# Patient Record
Sex: FEMALE | Race: WHITE | NOT HISPANIC OR LATINO | URBAN - METROPOLITAN AREA
[De-identification: names, ages, dates, MRNs, and addresses within clinical notes are randomized per-mention and may not be internally consistent; named-entity substitution may affect disease eponyms.]

---

## 2017-04-21 ENCOUNTER — OUTPATIENT (OUTPATIENT)
Dept: OUTPATIENT SERVICES | Age: 27
LOS: 1 days | Discharge: HOME | End: 2017-04-21

## 2017-04-21 PROBLEM — Z98.890 S/P FONTAN PROCEDURE: Status: ACTIVE | Noted: 2017-04-21

## 2017-04-24 ENCOUNTER — APPOINTMENT (OUTPATIENT)
Dept: PEDIATRIC CARDIOLOGY | Facility: CLINIC | Age: 27
End: 2017-04-24

## 2017-04-24 VITALS
BODY MASS INDEX: 26.77 KG/M2 | HEART RATE: 67 BPM | WEIGHT: 158.73 LBS | DIASTOLIC BLOOD PRESSURE: 77 MMHG | OXYGEN SATURATION: 98 % | HEIGHT: 64.57 IN | SYSTOLIC BLOOD PRESSURE: 122 MMHG | RESPIRATION RATE: 16 BRPM

## 2017-04-24 DIAGNOSIS — Z78.9 OTHER SPECIFIED HEALTH STATUS: ICD-10-CM

## 2017-04-24 DIAGNOSIS — Z98.890 OTHER SPECIFIED POSTPROCEDURAL STATES: ICD-10-CM

## 2017-04-24 DIAGNOSIS — Z86.718 PERSONAL HISTORY OF OTHER VENOUS THROMBOSIS AND EMBOLISM: ICD-10-CM

## 2017-05-25 ENCOUNTER — APPOINTMENT (OUTPATIENT)
Dept: PEDIATRIC CARDIOLOGY | Facility: CLINIC | Age: 27
End: 2017-05-25

## 2017-07-20 ENCOUNTER — APPOINTMENT (OUTPATIENT)
Dept: INTERVENTIONAL RADIOLOGY/VASCULAR | Facility: CLINIC | Age: 27
End: 2017-07-20

## 2017-07-20 VITALS
OXYGEN SATURATION: 95 % | HEART RATE: 65 BPM | HEIGHT: 65 IN | DIASTOLIC BLOOD PRESSURE: 85 MMHG | BODY MASS INDEX: 26.16 KG/M2 | SYSTOLIC BLOOD PRESSURE: 128 MMHG | WEIGHT: 157 LBS | RESPIRATION RATE: 18 BRPM

## 2017-07-20 DIAGNOSIS — N97.9 FEMALE INFERTILITY, UNSPECIFIED: ICD-10-CM

## 2017-07-20 DIAGNOSIS — Z83.3 FAMILY HISTORY OF DIABETES MELLITUS: ICD-10-CM

## 2017-07-20 DIAGNOSIS — Z87.891 PERSONAL HISTORY OF NICOTINE DEPENDENCE: ICD-10-CM

## 2017-07-31 ENCOUNTER — OUTPATIENT (OUTPATIENT)
Dept: OUTPATIENT SERVICES | Facility: HOSPITAL | Age: 27
LOS: 1 days | End: 2017-07-31
Payer: COMMERCIAL

## 2017-07-31 DIAGNOSIS — N97.9 FEMALE INFERTILITY, UNSPECIFIED: ICD-10-CM

## 2017-07-31 LAB
HCG UR-SCNC: NEGATIVE — SIGNIFICANT CHANGE UP
SP GR UR: 1.02 — SIGNIFICANT CHANGE UP (ref 1–1.03)

## 2017-07-31 PROCEDURE — 58340 CATHETER FOR HYSTEROGRAPHY: CPT | Mod: GC

## 2017-07-31 PROCEDURE — 74740 X-RAY FEMALE GENITAL TRACT: CPT | Mod: 26,GC

## 2017-08-04 DIAGNOSIS — N97.9 FEMALE INFERTILITY, UNSPECIFIED: ICD-10-CM

## 2017-09-20 ENCOUNTER — EMERGENCY (EMERGENCY)
Facility: HOSPITAL | Age: 27
LOS: 1 days | Discharge: HOME | End: 2017-09-20
Attending: EMERGENCY MEDICINE | Admitting: EMERGENCY MEDICINE
Payer: COMMERCIAL

## 2017-09-20 VITALS
HEART RATE: 70 BPM | DIASTOLIC BLOOD PRESSURE: 70 MMHG | SYSTOLIC BLOOD PRESSURE: 128 MMHG | TEMPERATURE: 98 F | RESPIRATION RATE: 18 BRPM | OXYGEN SATURATION: 100 %

## 2017-09-20 VITALS — SYSTOLIC BLOOD PRESSURE: 150 MMHG | DIASTOLIC BLOOD PRESSURE: 80 MMHG | HEART RATE: 84 BPM | RESPIRATION RATE: 20 BRPM

## 2017-09-20 LAB
ALBUMIN SERPL ELPH-MCNC: 5.1 G/DL — HIGH (ref 3.3–5)
ALP SERPL-CCNC: 46 U/L — SIGNIFICANT CHANGE UP (ref 40–120)
ALT FLD-CCNC: 17 U/L RC — SIGNIFICANT CHANGE UP (ref 10–45)
ANION GAP SERPL CALC-SCNC: 18 MMOL/L — HIGH (ref 5–17)
APTT BLD: 32.6 SEC — SIGNIFICANT CHANGE UP (ref 27.5–37.4)
AST SERPL-CCNC: 21 U/L — SIGNIFICANT CHANGE UP (ref 10–40)
BASOPHILS # BLD AUTO: 0 K/UL — SIGNIFICANT CHANGE UP (ref 0–0.2)
BASOPHILS NFR BLD AUTO: 0 % — SIGNIFICANT CHANGE UP (ref 0–2)
BILIRUB SERPL-MCNC: 0.9 MG/DL — SIGNIFICANT CHANGE UP (ref 0.2–1.2)
BUN SERPL-MCNC: 17 MG/DL — SIGNIFICANT CHANGE UP (ref 7–23)
CALCIUM SERPL-MCNC: 10 MG/DL — SIGNIFICANT CHANGE UP (ref 8.4–10.5)
CHLORIDE SERPL-SCNC: 101 MMOL/L — SIGNIFICANT CHANGE UP (ref 96–108)
CO2 SERPL-SCNC: 20 MMOL/L — LOW (ref 22–31)
CREAT SERPL-MCNC: 1 MG/DL — SIGNIFICANT CHANGE UP (ref 0.5–1.3)
EOSINOPHIL # BLD AUTO: 0.2 K/UL — SIGNIFICANT CHANGE UP (ref 0–0.5)
EOSINOPHIL NFR BLD AUTO: 3.5 % — SIGNIFICANT CHANGE UP (ref 0–6)
GLUCOSE SERPL-MCNC: 100 MG/DL — HIGH (ref 70–99)
HCG SERPL-ACNC: <2 MIU/ML — SIGNIFICANT CHANGE UP
HCT VFR BLD CALC: 47.4 % — HIGH (ref 34.5–45)
HGB BLD-MCNC: 16.4 G/DL — HIGH (ref 11.5–15.5)
INR BLD: 1.12 RATIO — SIGNIFICANT CHANGE UP (ref 0.88–1.16)
LYMPHOCYTES # BLD AUTO: 1.7 K/UL — SIGNIFICANT CHANGE UP (ref 1–3.3)
LYMPHOCYTES # BLD AUTO: 28.3 % — SIGNIFICANT CHANGE UP (ref 13–44)
MCHC RBC-ENTMCNC: 32.4 PG — SIGNIFICANT CHANGE UP (ref 27–34)
MCHC RBC-ENTMCNC: 34.6 GM/DL — SIGNIFICANT CHANGE UP (ref 32–36)
MCV RBC AUTO: 93.9 FL — SIGNIFICANT CHANGE UP (ref 80–100)
MONOCYTES # BLD AUTO: 0.4 K/UL — SIGNIFICANT CHANGE UP (ref 0–0.9)
MONOCYTES NFR BLD AUTO: 7.2 % — SIGNIFICANT CHANGE UP (ref 2–14)
NEUTROPHILS # BLD AUTO: 3.6 K/UL — SIGNIFICANT CHANGE UP (ref 1.8–7.4)
NEUTROPHILS NFR BLD AUTO: 61 % — SIGNIFICANT CHANGE UP (ref 43–77)
PLATELET # BLD AUTO: 163 K/UL — SIGNIFICANT CHANGE UP (ref 150–400)
POTASSIUM SERPL-MCNC: 4 MMOL/L — SIGNIFICANT CHANGE UP (ref 3.5–5.3)
POTASSIUM SERPL-SCNC: 4 MMOL/L — SIGNIFICANT CHANGE UP (ref 3.5–5.3)
PROT SERPL-MCNC: 8.7 G/DL — HIGH (ref 6–8.3)
PROTHROM AB SERPL-ACNC: 12.2 SEC — SIGNIFICANT CHANGE UP (ref 9.8–12.7)
RBC # BLD: 5.05 M/UL — SIGNIFICANT CHANGE UP (ref 3.8–5.2)
RBC # FLD: 11.4 % — SIGNIFICANT CHANGE UP (ref 10.3–14.5)
SODIUM SERPL-SCNC: 139 MMOL/L — SIGNIFICANT CHANGE UP (ref 135–145)
WBC # BLD: 5.9 K/UL — SIGNIFICANT CHANGE UP (ref 3.8–10.5)
WBC # FLD AUTO: 5.9 K/UL — SIGNIFICANT CHANGE UP (ref 3.8–10.5)

## 2017-09-20 PROCEDURE — 70450 CT HEAD/BRAIN W/O DYE: CPT | Mod: 26

## 2017-09-20 PROCEDURE — 80053 COMPREHEN METABOLIC PANEL: CPT

## 2017-09-20 PROCEDURE — 99285 EMERGENCY DEPT VISIT HI MDM: CPT | Mod: 25

## 2017-09-20 PROCEDURE — 70450 CT HEAD/BRAIN W/O DYE: CPT

## 2017-09-20 PROCEDURE — 85730 THROMBOPLASTIN TIME PARTIAL: CPT

## 2017-09-20 PROCEDURE — 71046 X-RAY EXAM CHEST 2 VIEWS: CPT

## 2017-09-20 PROCEDURE — 93005 ELECTROCARDIOGRAM TRACING: CPT

## 2017-09-20 PROCEDURE — 85610 PROTHROMBIN TIME: CPT

## 2017-09-20 PROCEDURE — 84702 CHORIONIC GONADOTROPIN TEST: CPT

## 2017-09-20 PROCEDURE — 93010 ELECTROCARDIOGRAM REPORT: CPT

## 2017-09-20 PROCEDURE — 85027 COMPLETE CBC AUTOMATED: CPT

## 2017-09-20 PROCEDURE — 71020: CPT | Mod: 26

## 2017-09-20 PROCEDURE — 99284 EMERGENCY DEPT VISIT MOD MDM: CPT | Mod: 25

## 2017-09-20 NOTE — ED ADULT NURSE NOTE - OBJECTIVE STATEMENT
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 pt describes the episode as her r. eye feeling like it was drifted to the right with a droop. EMS described the pts symptoms as a "looking like she has a lazy eye". upon arrival pts drift and vision had resolved. states she feels like her vision is slightly blurred but she feels much better. denies any other complaints of pain or discomfort. no HA, dizziness, nausea. neuro intact. family at bedside. labs obtained. pt pending ct scan.

## 2017-09-20 NOTE — ED PROVIDER NOTE - ATTENDING CONTRIBUTION TO CARE
Patient presenting with R eye vision changes, no other neurologic symptoms.  Vision symptoms now resolving.  On exam vital signs within normal limits, neurologically intact, RRR, lungs clear, abdomen soft, NT, ND.  Possible TIA given history of cardiologic disease and prior thromboembolic events, plan for labs, CT head, neurology consultation.

## 2017-09-20 NOTE — CONSULT NOTE ADULT - SUBJECTIVE AND OBJECTIVE BOX
NEUROLOGY CONSULT     Patient is a 27y old  Female who presents with a chief complaint of transient visual changes     HPI: 26 y/o  F w/ h/o infertility currently undergoing treatments (IUI), congenital heart defects w/ multiple surgeries, dextrocardia, embolism to lower back (?), previously on Lovenox- Coumadin, currently on  only, presenting w/ episode of transient visual changes. Patient was at work today when she suddenly felt dizzy, which lasted about 1 sec and immediately after started having double vision. She saw herself in the mirror and felt her eyes looked different, but unable to describe it. Double vision lasted 40 mins and resolved on arrival to the ED. Currently, patient denies any symptoms, however, feels a "little off," and her eyes feel sleepy. Currently, and during the episode, denies weakness, sensory changes, dysarthria, dysphagia, difficulty walking.       PAST MEDICAL & SURGICAL HISTORY:      Allergies      Intolerances        MEDICATIONS  (STANDING):    MEDICATIONS  (PRN):      SOCIAL HISTORY: Denies tobacco/EtOH/drug use     FAMILY HISTORY:      REVIEW OF SYSTEMS:  CONSTITUTIONAL:  No weight loss, fever, chills, weakness or fatigue.  HEENT:  Eyes:  No visual loss, blurred vision, double vision or yellow sclerae. Ears, Nose, Throat:  No hearing loss, sneezing, congestion, runny nose or sore throat.  SKIN:  No rash or itching.  CARDIOVASCULAR:  No chest pain, chest pressure or chest discomfort. No palpitations or edema.  RESPIRATORY:  No shortness of breath, cough or sputum.  GASTROINTESTINAL:  No anorexia, nausea, vomiting or diarrhea. No abdominal pain or blood.  GENITOURINARY:  denies incontinence/retention   NEUROLOGICAL:  No headache, dizziness, syncope, paralysis, ataxia, numbness or tingling in the extremities. No change in bowel or bladder control.  MUSCULOSKELETAL:  No muscle, back pain, joint pain or stiffness.  HEMATOLOGIC:  No anemia, bleeding or bruising.  LYMPHATICS:  No enlarged nodes. No history of splenectomy.  PSYCHIATRIC:  No history of depression or anxiety.  ENDOCRINOLOGIC:  No reports of sweating, cold or heat intolerance. No polyuria or polydipsia.      Vital Signs Last 24 Hrs  T(C): 36.8 (20 Sep 2017 16:59), Max: 36.8 (20 Sep 2017 16:59)  T(F): 98.2 (20 Sep 2017 16:59), Max: 98.2 (20 Sep 2017 16:59)  HR: 75 (20 Sep 2017 16:59) (75 - 84)  BP: 137/86 (20 Sep 2017 16:59) (137/86 - 150/80)  BP(mean): --  RR: 20 (20 Sep 2017 16:59) (20 - 20)  SpO2: 100% (20 Sep 2017 16:59) (100% - 100%)    PHYSICAL EXAM:   General appearance: No acute distress                 Mental Status: AAOx3, fluent speech, follows simple commands, able to name  Cranial Nerves: EOMI, PERRL, VFF, V1-V3 intact, facial symmetric,  tongue midline  Motor: strength 5/5 throughout. No drift x4  Sensation: Intact to LT throughout  Coordination: FTN intact b/l  NIHSS: 0 MRS: 0     LABS:                          16.4   5.9   )-----------( 163      ( 20 Sep 2017 17:01 )             47.4     09-20    139  |  101  |  17  ----------------------------<  100<H>  4.0   |  20<L>  |  1.00    Ca    10.0      20 Sep 2017 17:01    TPro  8.7<H>  /  Alb  5.1<H>  /  TBili  0.9  /  DBili  x   /  AST  21  /  ALT  17  /  AlkPhos  46  09-20      IMAGING:

## 2017-09-20 NOTE — ED PROVIDER NOTE - PROGRESS NOTE DETAILS
Neurology recommending stay in CDU for MRI, TTE to rule out TIA.  CDU contacted and beds available.  patient now requesting to be discharged to follow up with her physician.  Given ongoing concern for possible CNS cause of symptoms and her risk factors will sign out AMA.  Xu Breen M.D.

## 2017-09-20 NOTE — CONSULT NOTE ADULT - ASSESSMENT
27 y/o  F w/ multiple medical problems presenting w/ transient double vision and lightheadedness, currently asymptomatic. Given patient at high risk of cardioembolic events given her prior history, will need further evaluation.     Plan:   - MRI brain w/out contrast   - MRV brain w/out contrast (patient could still be pregnant even though B- HcG negative as she recently underwent IUI cycle)   - telemetry monitoring   - TTE to assess for intracardiac clot

## 2017-09-20 NOTE — ED PROVIDER NOTE - OBJECTIVE STATEMENT
27yof pmhx of congential heart disease on ASA 81mg here with acute onset of dizziness while sitting at a desk along with few seconds of visual disturbance L worse than R. 27yof pmhx of congential heart disease on ASA 81mg here with acute onset of dizziness while sitting at a desk along with few seconds of visual disturbance L worse than R.  Denying headaches, visual changes, numbness, tingling and weakness.  History of thromboembolic events (none neurologic) in past due to her heart disease, but only on full dose ASA per cardiologist.  Symptoms now improving.

## 2017-11-15 ENCOUNTER — MOBILE ON CALL (OUTPATIENT)
Age: 27
End: 2017-11-15

## 2018-03-09 ENCOUNTER — APPOINTMENT (OUTPATIENT)
Dept: HUMAN REPRODUCTION | Facility: CLINIC | Age: 28
End: 2018-03-09
Payer: COMMERCIAL

## 2018-03-09 PROCEDURE — 99205 OFFICE O/P NEW HI 60 MIN: CPT

## 2018-03-09 PROCEDURE — 36415 COLL VENOUS BLD VENIPUNCTURE: CPT

## 2018-03-09 PROCEDURE — 76830 TRANSVAGINAL US NON-OB: CPT

## 2018-03-20 ENCOUNTER — OTHER (OUTPATIENT)
Age: 28
End: 2018-03-20

## 2018-03-20 ENCOUNTER — APPOINTMENT (OUTPATIENT)
Dept: HUMAN REPRODUCTION | Facility: CLINIC | Age: 28
End: 2018-03-20
Payer: COMMERCIAL

## 2018-03-20 PROCEDURE — 99213 OFFICE O/P EST LOW 20 MIN: CPT | Mod: 25

## 2018-03-20 PROCEDURE — 36415 COLL VENOUS BLD VENIPUNCTURE: CPT

## 2018-03-20 PROCEDURE — 76830 TRANSVAGINAL US NON-OB: CPT

## 2018-03-29 ENCOUNTER — APPOINTMENT (OUTPATIENT)
Dept: HUMAN REPRODUCTION | Facility: CLINIC | Age: 28
End: 2018-03-29
Payer: COMMERCIAL

## 2018-03-29 PROCEDURE — 36415 COLL VENOUS BLD VENIPUNCTURE: CPT

## 2018-03-29 PROCEDURE — 76817 TRANSVAGINAL US OBSTETRIC: CPT

## 2018-03-29 PROCEDURE — 99213 OFFICE O/P EST LOW 20 MIN: CPT | Mod: 25

## 2018-04-10 ENCOUNTER — APPOINTMENT (OUTPATIENT)
Dept: MATERNAL FETAL MEDICINE | Facility: CLINIC | Age: 28
End: 2018-04-10
Payer: COMMERCIAL

## 2018-04-10 ENCOUNTER — APPOINTMENT (OUTPATIENT)
Dept: ANTEPARTUM | Facility: CLINIC | Age: 28
End: 2018-04-10

## 2018-04-10 VITALS
DIASTOLIC BLOOD PRESSURE: 80 MMHG | OXYGEN SATURATION: 97 % | BODY MASS INDEX: 26.49 KG/M2 | WEIGHT: 159 LBS | HEART RATE: 59 BPM | SYSTOLIC BLOOD PRESSURE: 130 MMHG | HEIGHT: 65 IN

## 2018-04-10 LAB
BILIRUB UR QL STRIP: NEGATIVE
COLLECTION METHOD: NORMAL
GLUCOSE UR-MCNC: NEGATIVE
HCG UR QL: 0.2 EU/DL
HGB UR QL STRIP.AUTO: NEGATIVE
KETONES UR-MCNC: NEGATIVE
LEUKOCYTE ESTERASE UR QL STRIP: NEGATIVE
NITRITE UR QL STRIP: NEGATIVE
PH UR STRIP: 6
PROT UR STRIP-MCNC: NEGATIVE
SP GR UR STRIP: 1.02

## 2018-04-10 PROCEDURE — 99242 OFF/OP CONSLTJ NEW/EST SF 20: CPT

## 2018-04-19 ENCOUNTER — APPOINTMENT (OUTPATIENT)
Dept: HUMAN REPRODUCTION | Facility: CLINIC | Age: 28
End: 2018-04-19
Payer: COMMERCIAL

## 2018-04-19 PROCEDURE — 36415 COLL VENOUS BLD VENIPUNCTURE: CPT

## 2018-04-19 PROCEDURE — 76830 TRANSVAGINAL US NON-OB: CPT

## 2018-04-19 PROCEDURE — 99213 OFFICE O/P EST LOW 20 MIN: CPT | Mod: 25

## 2018-04-23 LAB
ALBUMIN SERPL ELPH-MCNC: 4.4 G/DL
ALP BLD-CCNC: 42 U/L
ALT SERPL-CCNC: 13 U/L
ANION GAP SERPL CALC-SCNC: 16 MMOL/L
AST SERPL-CCNC: 13 U/L
BASOPHILS # BLD AUTO: 0.01 K/UL
BASOPHILS NFR BLD AUTO: 0.2 %
BILIRUB SERPL-MCNC: 0.9 MG/DL
BUN SERPL-MCNC: 11 MG/DL
CALCIUM SERPL-MCNC: 9.3 MG/DL
CHLORIDE SERPL-SCNC: 102 MMOL/L
CO2 SERPL-SCNC: 22 MMOL/L
CREAT SERPL-MCNC: 0.88 MG/DL
CREAT SPEC-SCNC: 116 MG/DL
CREAT/PROT UR: 0 RATIO
EOSINOPHIL # BLD AUTO: 0.11 K/UL
EOSINOPHIL NFR BLD AUTO: 2.1 %
GLUCOSE SERPL-MCNC: 96 MG/DL
HCT VFR BLD CALC: 43 %
HGB BLD-MCNC: 15.2 G/DL
IMM GRANULOCYTES NFR BLD AUTO: 0 %
LYMPHOCYTES # BLD AUTO: 1.03 K/UL
LYMPHOCYTES NFR BLD AUTO: 19.5 %
MAN DIFF?: NORMAL
MCHC RBC-ENTMCNC: 31.9 PG
MCHC RBC-ENTMCNC: 35.3 GM/DL
MCV RBC AUTO: 90.3 FL
MONOCYTES # BLD AUTO: 0.35 K/UL
MONOCYTES NFR BLD AUTO: 6.6 %
NEUTROPHILS # BLD AUTO: 3.79 K/UL
NEUTROPHILS NFR BLD AUTO: 71.6 %
PLATELET # BLD AUTO: 157 K/UL
POTASSIUM SERPL-SCNC: 4.4 MMOL/L
PROT SERPL-MCNC: 7.6 G/DL
PROT UR-MCNC: 5 MG/DL
RBC # BLD: 4.76 M/UL
RBC # FLD: 13.1 %
SODIUM SERPL-SCNC: 140 MMOL/L
WBC # FLD AUTO: 5.29 K/UL

## 2018-04-24 ENCOUNTER — APPOINTMENT (OUTPATIENT)
Dept: HUMAN REPRODUCTION | Facility: CLINIC | Age: 28
End: 2018-04-24
Payer: COMMERCIAL

## 2018-04-24 PROCEDURE — 76830 TRANSVAGINAL US NON-OB: CPT

## 2018-04-24 PROCEDURE — 99213 OFFICE O/P EST LOW 20 MIN: CPT | Mod: 25

## 2018-04-26 ENCOUNTER — APPOINTMENT (OUTPATIENT)
Dept: HUMAN REPRODUCTION | Facility: CLINIC | Age: 28
End: 2018-04-26
Payer: COMMERCIAL

## 2018-04-26 PROCEDURE — 76830 TRANSVAGINAL US NON-OB: CPT

## 2018-04-26 PROCEDURE — 36415 COLL VENOUS BLD VENIPUNCTURE: CPT

## 2018-04-26 PROCEDURE — 99213 OFFICE O/P EST LOW 20 MIN: CPT | Mod: 25

## 2018-04-28 ENCOUNTER — APPOINTMENT (OUTPATIENT)
Dept: HUMAN REPRODUCTION | Facility: CLINIC | Age: 28
End: 2018-04-28
Payer: COMMERCIAL

## 2018-04-28 PROCEDURE — 76830 TRANSVAGINAL US NON-OB: CPT

## 2018-04-28 PROCEDURE — 36415 COLL VENOUS BLD VENIPUNCTURE: CPT

## 2018-04-28 PROCEDURE — 99213 OFFICE O/P EST LOW 20 MIN: CPT | Mod: 25

## 2018-04-30 ENCOUNTER — APPOINTMENT (OUTPATIENT)
Dept: HUMAN REPRODUCTION | Facility: CLINIC | Age: 28
End: 2018-04-30
Payer: COMMERCIAL

## 2018-04-30 PROCEDURE — 76830 TRANSVAGINAL US NON-OB: CPT

## 2018-04-30 PROCEDURE — 36415 COLL VENOUS BLD VENIPUNCTURE: CPT

## 2018-04-30 PROCEDURE — 99213 OFFICE O/P EST LOW 20 MIN: CPT | Mod: 25

## 2018-05-01 ENCOUNTER — APPOINTMENT (OUTPATIENT)
Dept: HUMAN REPRODUCTION | Facility: CLINIC | Age: 28
End: 2018-05-01
Payer: COMMERCIAL

## 2018-05-01 PROCEDURE — 36415 COLL VENOUS BLD VENIPUNCTURE: CPT

## 2018-05-01 PROCEDURE — 76830 TRANSVAGINAL US NON-OB: CPT

## 2018-05-01 PROCEDURE — 99213 OFFICE O/P EST LOW 20 MIN: CPT | Mod: 25

## 2018-05-02 ENCOUNTER — APPOINTMENT (OUTPATIENT)
Dept: HUMAN REPRODUCTION | Facility: CLINIC | Age: 28
End: 2018-05-02
Payer: COMMERCIAL

## 2018-05-02 PROCEDURE — 36415 COLL VENOUS BLD VENIPUNCTURE: CPT

## 2018-05-02 PROCEDURE — 99213 OFFICE O/P EST LOW 20 MIN: CPT | Mod: 25

## 2018-05-02 PROCEDURE — 76830 TRANSVAGINAL US NON-OB: CPT

## 2018-05-03 ENCOUNTER — APPOINTMENT (OUTPATIENT)
Dept: HUMAN REPRODUCTION | Facility: CLINIC | Age: 28
End: 2018-05-03
Payer: COMMERCIAL

## 2018-05-03 PROCEDURE — 76830 TRANSVAGINAL US NON-OB: CPT

## 2018-05-03 PROCEDURE — 99213 OFFICE O/P EST LOW 20 MIN: CPT | Mod: 25

## 2018-05-03 PROCEDURE — 36415 COLL VENOUS BLD VENIPUNCTURE: CPT

## 2018-05-04 ENCOUNTER — APPOINTMENT (OUTPATIENT)
Dept: HUMAN REPRODUCTION | Facility: HOSPITAL | Age: 28
End: 2018-05-04
Payer: COMMERCIAL

## 2018-05-04 ENCOUNTER — OUTPATIENT (OUTPATIENT)
Dept: OUTPATIENT SERVICES | Facility: HOSPITAL | Age: 28
LOS: 1 days | End: 2018-05-04
Payer: COMMERCIAL

## 2018-05-04 ENCOUNTER — APPOINTMENT (OUTPATIENT)
Dept: HUMAN REPRODUCTION | Facility: CLINIC | Age: 28
End: 2018-05-04
Payer: COMMERCIAL

## 2018-05-04 VITALS
RESPIRATION RATE: 18 BRPM | HEART RATE: 62 BPM | HEIGHT: 65 IN | OXYGEN SATURATION: 100 % | WEIGHT: 164.91 LBS | SYSTOLIC BLOOD PRESSURE: 126 MMHG | TEMPERATURE: 99 F | DIASTOLIC BLOOD PRESSURE: 80 MMHG

## 2018-05-04 VITALS
RESPIRATION RATE: 17 BRPM | HEART RATE: 70 BPM | DIASTOLIC BLOOD PRESSURE: 67 MMHG | SYSTOLIC BLOOD PRESSURE: 120 MMHG | OXYGEN SATURATION: 100 %

## 2018-05-04 DIAGNOSIS — N97.9 FEMALE INFERTILITY, UNSPECIFIED: ICD-10-CM

## 2018-05-04 PROCEDURE — 76948 ECHO GUIDE OVA ASPIRATION: CPT

## 2018-05-04 PROCEDURE — 76830 TRANSVAGINAL US NON-OB: CPT

## 2018-05-04 PROCEDURE — 99213 OFFICE O/P EST LOW 20 MIN: CPT | Mod: 25

## 2018-05-04 PROCEDURE — 89281 ASSIST OOCYTE FERTILIZATION: CPT

## 2018-05-04 PROCEDURE — 89250 CULTR OOCYTE/EMBRYO <4 DAYS: CPT

## 2018-05-04 PROCEDURE — 58970 RETRIEVAL OF OOCYTE: CPT

## 2018-05-04 PROCEDURE — 89254 OOCYTE IDENTIFICATION: CPT

## 2018-05-04 PROCEDURE — 89291 BIOPSY OOCYTE POLAR BODY: CPT

## 2018-05-04 NOTE — ASU DISCHARGE PLAN (ADULT/PEDIATRIC). - NOTIFY
Bleeding that does not stop/Persistent Nausea and Vomiting/Unable to Urinate/Pain not relieved by Medications/GYN Fever>100.4

## 2018-05-04 NOTE — ASU DISCHARGE PLAN (ADULT/PEDIATRIC). - MEDICATION SUMMARY - MEDICATIONS TO TAKE
I will START or STAY ON the medications listed below when I get home from the hospital:    aspirin 325 mg oral tablet  -- Indication: For home med    acetaminophen 500 mg oral tablet  -- 2 tab(s) by mouth every 6 hours, As Needed - for severe pain  -- Indication: For pain    Lovenox  -- 70  -- Indication: For home med

## 2018-05-04 NOTE — ASU DISCHARGE PLAN (ADULT/PEDIATRIC). - ITEMS TO FOLLOWUP WITH YOUR PHYSICIAN'S
Follow up with Dr Abraham on Monday 5/7 or Tuesday 5/8. Call the office for appointment confirmation and with any concerns. You may have some light bleeding or cramping. Please call with any severe abdominal pain, bloating, chest pain, or shortness of breath. You may take pain medication as you need it.

## 2018-05-04 NOTE — ASU DISCHARGE PLAN (ADULT/PEDIATRIC). - ACTIVITY LEVEL
no heavy lifting/no weight bearing/nothing per rectum/nothing per vagina/no tub baths/no douching/no tampons/no intercourse

## 2018-05-07 PROCEDURE — 89253 EMBRYO HATCHING: CPT

## 2018-05-08 ENCOUNTER — APPOINTMENT (OUTPATIENT)
Dept: HUMAN REPRODUCTION | Facility: CLINIC | Age: 28
End: 2018-05-08
Payer: COMMERCIAL

## 2018-05-08 PROCEDURE — 89272 EXTENDED CULTURE OF OOCYTES: CPT

## 2018-05-08 PROCEDURE — 99213 OFFICE O/P EST LOW 20 MIN: CPT | Mod: 25

## 2018-05-08 PROCEDURE — 36415 COLL VENOUS BLD VENIPUNCTURE: CPT

## 2018-05-08 PROCEDURE — 76830 TRANSVAGINAL US NON-OB: CPT

## 2018-05-09 PROCEDURE — 89258 CRYOPRESERVATION EMBRYO(S): CPT

## 2018-05-09 PROCEDURE — 89342 STORAGE/YEAR EMBRYO(S): CPT

## 2018-05-10 PROCEDURE — 89258 CRYOPRESERVATION EMBRYO(S): CPT

## 2018-05-15 ENCOUNTER — APPOINTMENT (OUTPATIENT)
Dept: HUMAN REPRODUCTION | Facility: CLINIC | Age: 28
End: 2018-05-15
Payer: COMMERCIAL

## 2018-05-15 PROCEDURE — 99213 OFFICE O/P EST LOW 20 MIN: CPT | Mod: 25

## 2018-05-15 PROCEDURE — 76830 TRANSVAGINAL US NON-OB: CPT

## 2018-05-18 ENCOUNTER — APPOINTMENT (OUTPATIENT)
Dept: HUMAN REPRODUCTION | Facility: CLINIC | Age: 28
End: 2018-05-18
Payer: COMMERCIAL

## 2018-05-18 PROCEDURE — 76830 TRANSVAGINAL US NON-OB: CPT

## 2018-05-18 PROCEDURE — 99213 OFFICE O/P EST LOW 20 MIN: CPT | Mod: 25

## 2018-05-18 PROCEDURE — 36415 COLL VENOUS BLD VENIPUNCTURE: CPT

## 2018-05-24 ENCOUNTER — APPOINTMENT (OUTPATIENT)
Dept: HUMAN REPRODUCTION | Facility: CLINIC | Age: 28
End: 2018-05-24
Payer: COMMERCIAL

## 2018-05-24 PROCEDURE — 76830 TRANSVAGINAL US NON-OB: CPT

## 2018-05-24 PROCEDURE — 99213 OFFICE O/P EST LOW 20 MIN: CPT | Mod: 25

## 2018-05-24 PROCEDURE — 36415 COLL VENOUS BLD VENIPUNCTURE: CPT

## 2018-05-31 ENCOUNTER — APPOINTMENT (OUTPATIENT)
Dept: HUMAN REPRODUCTION | Facility: CLINIC | Age: 28
End: 2018-05-31
Payer: COMMERCIAL

## 2018-05-31 PROCEDURE — 36415 COLL VENOUS BLD VENIPUNCTURE: CPT

## 2018-05-31 PROCEDURE — 99213 OFFICE O/P EST LOW 20 MIN: CPT | Mod: 25

## 2018-05-31 PROCEDURE — 76830 TRANSVAGINAL US NON-OB: CPT

## 2018-06-07 ENCOUNTER — OTHER (OUTPATIENT)
Age: 28
End: 2018-06-07

## 2018-06-07 ENCOUNTER — APPOINTMENT (OUTPATIENT)
Dept: HUMAN REPRODUCTION | Facility: CLINIC | Age: 28
End: 2018-06-07
Payer: COMMERCIAL

## 2018-06-07 PROCEDURE — 99213 OFFICE O/P EST LOW 20 MIN: CPT | Mod: 25

## 2018-06-07 PROCEDURE — 36415 COLL VENOUS BLD VENIPUNCTURE: CPT

## 2018-06-07 PROCEDURE — 76830 TRANSVAGINAL US NON-OB: CPT

## 2018-06-12 ENCOUNTER — OTHER (OUTPATIENT)
Age: 28
End: 2018-06-12

## 2018-06-14 ENCOUNTER — APPOINTMENT (OUTPATIENT)
Dept: HUMAN REPRODUCTION | Facility: CLINIC | Age: 28
End: 2018-06-14
Payer: COMMERCIAL

## 2018-06-14 PROCEDURE — 76942 ECHO GUIDE FOR BIOPSY: CPT

## 2018-06-14 PROCEDURE — 89255 PREPARE EMBRYO FOR TRANSFER: CPT

## 2018-06-14 PROCEDURE — 58974 EMBRYO TRANSFER INTRAUTERINE: CPT

## 2018-06-14 PROCEDURE — 89398 UNLISTED REPROD MED LAB PROC: CPT

## 2018-06-14 PROCEDURE — 89250 CULTR OOCYTE/EMBRYO <4 DAYS: CPT

## 2018-06-14 PROCEDURE — 89352 THAWING CRYOPRESRVED EMBRYO: CPT

## 2018-06-30 ENCOUNTER — APPOINTMENT (OUTPATIENT)
Dept: HUMAN REPRODUCTION | Facility: CLINIC | Age: 28
End: 2018-06-30
Payer: COMMERCIAL

## 2018-06-30 PROCEDURE — 99213 OFFICE O/P EST LOW 20 MIN: CPT | Mod: 25

## 2018-06-30 PROCEDURE — 36415 COLL VENOUS BLD VENIPUNCTURE: CPT

## 2018-06-30 PROCEDURE — 76830 TRANSVAGINAL US NON-OB: CPT

## 2018-07-09 ENCOUNTER — APPOINTMENT (OUTPATIENT)
Dept: HUMAN REPRODUCTION | Facility: CLINIC | Age: 28
End: 2018-07-09
Payer: COMMERCIAL

## 2018-07-09 PROCEDURE — 76830 TRANSVAGINAL US NON-OB: CPT

## 2018-07-09 PROCEDURE — 99213 OFFICE O/P EST LOW 20 MIN: CPT | Mod: 25

## 2018-07-11 ENCOUNTER — APPOINTMENT (OUTPATIENT)
Dept: HUMAN REPRODUCTION | Facility: CLINIC | Age: 28
End: 2018-07-11
Payer: COMMERCIAL

## 2018-07-11 PROCEDURE — 36415 COLL VENOUS BLD VENIPUNCTURE: CPT

## 2018-07-11 PROCEDURE — 76830 TRANSVAGINAL US NON-OB: CPT

## 2018-07-11 PROCEDURE — 99213 OFFICE O/P EST LOW 20 MIN: CPT | Mod: 25

## 2018-07-12 ENCOUNTER — APPOINTMENT (OUTPATIENT)
Dept: HUMAN REPRODUCTION | Facility: CLINIC | Age: 28
End: 2018-07-12
Payer: COMMERCIAL

## 2018-07-12 PROCEDURE — 36415 COLL VENOUS BLD VENIPUNCTURE: CPT

## 2018-07-12 PROCEDURE — 99213 OFFICE O/P EST LOW 20 MIN: CPT | Mod: 25

## 2018-07-12 PROCEDURE — 76830 TRANSVAGINAL US NON-OB: CPT

## 2018-07-19 ENCOUNTER — APPOINTMENT (OUTPATIENT)
Dept: HUMAN REPRODUCTION | Facility: CLINIC | Age: 28
End: 2018-07-19
Payer: COMMERCIAL

## 2018-07-19 PROCEDURE — 76942 ECHO GUIDE FOR BIOPSY: CPT

## 2018-07-19 PROCEDURE — 89250 CULTR OOCYTE/EMBRYO <4 DAYS: CPT

## 2018-07-19 PROCEDURE — 89398 UNLISTED REPROD MED LAB PROC: CPT

## 2018-07-19 PROCEDURE — 58974 EMBRYO TRANSFER INTRAUTERINE: CPT

## 2018-07-19 PROCEDURE — 89255 PREPARE EMBRYO FOR TRANSFER: CPT

## 2018-07-19 PROCEDURE — 89352 THAWING CRYOPRESRVED EMBRYO: CPT

## 2018-07-30 ENCOUNTER — APPOINTMENT (OUTPATIENT)
Dept: HUMAN REPRODUCTION | Facility: CLINIC | Age: 28
End: 2018-07-30

## 2018-08-24 ENCOUNTER — OUTPATIENT (OUTPATIENT)
Dept: OUTPATIENT SERVICES | Age: 28
LOS: 1 days | Discharge: HOME | End: 2018-08-24

## 2018-08-25 ENCOUNTER — RESULT CHARGE (OUTPATIENT)
Age: 28
End: 2018-08-25

## 2018-08-27 ENCOUNTER — APPOINTMENT (OUTPATIENT)
Dept: PEDIATRIC CARDIOLOGY | Facility: CLINIC | Age: 28
End: 2018-08-27
Payer: COMMERCIAL

## 2018-08-27 ENCOUNTER — APPOINTMENT (OUTPATIENT)
Dept: HUMAN REPRODUCTION | Facility: CLINIC | Age: 28
End: 2018-08-27
Payer: COMMERCIAL

## 2018-08-27 VITALS
HEIGHT: 65.35 IN | WEIGHT: 158.73 LBS | RESPIRATION RATE: 18 BRPM | BODY MASS INDEX: 26.13 KG/M2 | SYSTOLIC BLOOD PRESSURE: 122 MMHG | OXYGEN SATURATION: 99 % | HEART RATE: 72 BPM | DIASTOLIC BLOOD PRESSURE: 77 MMHG

## 2018-08-27 PROCEDURE — 93320 DOPPLER ECHO COMPLETE: CPT

## 2018-08-27 PROCEDURE — 93303 ECHO TRANSTHORACIC: CPT

## 2018-08-27 PROCEDURE — 99215 OFFICE O/P EST HI 40 MIN: CPT | Mod: 25

## 2018-08-27 PROCEDURE — 93325 DOPPLER ECHO COLOR FLOW MAPG: CPT

## 2018-08-27 PROCEDURE — 93000 ELECTROCARDIOGRAM COMPLETE: CPT

## 2018-08-27 PROCEDURE — 99214 OFFICE O/P EST MOD 30 MIN: CPT

## 2018-08-31 ENCOUNTER — RX RENEWAL (OUTPATIENT)
Age: 28
End: 2018-08-31

## 2018-09-04 ENCOUNTER — APPOINTMENT (OUTPATIENT)
Dept: HUMAN REPRODUCTION | Facility: CLINIC | Age: 28
End: 2018-09-04

## 2018-09-06 ENCOUNTER — APPOINTMENT (OUTPATIENT)
Dept: HUMAN REPRODUCTION | Facility: CLINIC | Age: 28
End: 2018-09-06
Payer: COMMERCIAL

## 2018-09-06 PROCEDURE — 99213 OFFICE O/P EST LOW 20 MIN: CPT | Mod: 25

## 2018-09-06 PROCEDURE — 58340 CATHETER FOR HYSTEROGRAPHY: CPT

## 2018-09-06 PROCEDURE — 76831 ECHO EXAM UTERUS: CPT

## 2018-09-17 ENCOUNTER — APPOINTMENT (OUTPATIENT)
Dept: PEDIATRIC CARDIOLOGY | Facility: CLINIC | Age: 28
End: 2018-09-17

## 2018-09-20 ENCOUNTER — APPOINTMENT (OUTPATIENT)
Dept: HUMAN REPRODUCTION | Facility: CLINIC | Age: 28
End: 2018-09-20
Payer: COMMERCIAL

## 2018-09-20 PROCEDURE — 99213 OFFICE O/P EST LOW 20 MIN: CPT | Mod: 25

## 2018-09-20 PROCEDURE — 36415 COLL VENOUS BLD VENIPUNCTURE: CPT

## 2018-09-20 PROCEDURE — 76830 TRANSVAGINAL US NON-OB: CPT

## 2018-09-24 ENCOUNTER — APPOINTMENT (OUTPATIENT)
Dept: HUMAN REPRODUCTION | Facility: CLINIC | Age: 28
End: 2018-09-24
Payer: COMMERCIAL

## 2018-09-24 PROCEDURE — 36415 COLL VENOUS BLD VENIPUNCTURE: CPT

## 2018-09-24 PROCEDURE — 76830 TRANSVAGINAL US NON-OB: CPT

## 2018-09-24 PROCEDURE — 99213 OFFICE O/P EST LOW 20 MIN: CPT | Mod: 25

## 2018-09-26 ENCOUNTER — APPOINTMENT (OUTPATIENT)
Dept: HUMAN REPRODUCTION | Facility: CLINIC | Age: 28
End: 2018-09-26
Payer: COMMERCIAL

## 2018-09-26 PROCEDURE — 76830 TRANSVAGINAL US NON-OB: CPT

## 2018-09-26 PROCEDURE — 99213 OFFICE O/P EST LOW 20 MIN: CPT | Mod: 25

## 2018-09-26 PROCEDURE — 36415 COLL VENOUS BLD VENIPUNCTURE: CPT

## 2018-09-28 ENCOUNTER — APPOINTMENT (OUTPATIENT)
Dept: HUMAN REPRODUCTION | Facility: CLINIC | Age: 28
End: 2018-09-28
Payer: COMMERCIAL

## 2018-09-28 PROCEDURE — 36415 COLL VENOUS BLD VENIPUNCTURE: CPT

## 2018-09-28 PROCEDURE — 99213 OFFICE O/P EST LOW 20 MIN: CPT | Mod: 25

## 2018-09-28 PROCEDURE — 76830 TRANSVAGINAL US NON-OB: CPT

## 2018-09-30 ENCOUNTER — APPOINTMENT (OUTPATIENT)
Dept: HUMAN REPRODUCTION | Facility: CLINIC | Age: 28
End: 2018-09-30
Payer: COMMERCIAL

## 2018-09-30 PROCEDURE — 76830 TRANSVAGINAL US NON-OB: CPT

## 2018-09-30 PROCEDURE — 36415 COLL VENOUS BLD VENIPUNCTURE: CPT

## 2018-09-30 PROCEDURE — 99213 OFFICE O/P EST LOW 20 MIN: CPT | Mod: 25

## 2018-10-01 ENCOUNTER — APPOINTMENT (OUTPATIENT)
Dept: HUMAN REPRODUCTION | Facility: CLINIC | Age: 28
End: 2018-10-01
Payer: COMMERCIAL

## 2018-10-01 PROCEDURE — 99213 OFFICE O/P EST LOW 20 MIN: CPT | Mod: 25

## 2018-10-01 PROCEDURE — 76830 TRANSVAGINAL US NON-OB: CPT

## 2018-10-01 PROCEDURE — 36415 COLL VENOUS BLD VENIPUNCTURE: CPT

## 2018-10-02 ENCOUNTER — APPOINTMENT (OUTPATIENT)
Dept: HUMAN REPRODUCTION | Facility: CLINIC | Age: 28
End: 2018-10-02
Payer: COMMERCIAL

## 2018-10-02 PROCEDURE — 36415 COLL VENOUS BLD VENIPUNCTURE: CPT

## 2018-10-02 PROCEDURE — 99213 OFFICE O/P EST LOW 20 MIN: CPT | Mod: 25

## 2018-10-02 PROCEDURE — 76830 TRANSVAGINAL US NON-OB: CPT

## 2018-10-03 ENCOUNTER — APPOINTMENT (OUTPATIENT)
Dept: HUMAN REPRODUCTION | Facility: CLINIC | Age: 28
End: 2018-10-03
Payer: COMMERCIAL

## 2018-10-03 PROCEDURE — 76830 TRANSVAGINAL US NON-OB: CPT

## 2018-10-03 PROCEDURE — 99213 OFFICE O/P EST LOW 20 MIN: CPT | Mod: 25

## 2018-10-03 PROCEDURE — 36415 COLL VENOUS BLD VENIPUNCTURE: CPT

## 2018-10-04 ENCOUNTER — APPOINTMENT (OUTPATIENT)
Dept: HUMAN REPRODUCTION | Facility: CLINIC | Age: 28
End: 2018-10-04
Payer: COMMERCIAL

## 2018-10-04 PROCEDURE — 76830 TRANSVAGINAL US NON-OB: CPT

## 2018-10-04 PROCEDURE — 36415 COLL VENOUS BLD VENIPUNCTURE: CPT

## 2018-10-04 PROCEDURE — 99213 OFFICE O/P EST LOW 20 MIN: CPT | Mod: 25

## 2018-10-05 ENCOUNTER — APPOINTMENT (OUTPATIENT)
Dept: HUMAN REPRODUCTION | Facility: CLINIC | Age: 28
End: 2018-10-05
Payer: COMMERCIAL

## 2018-10-05 ENCOUNTER — OUTPATIENT (OUTPATIENT)
Dept: OUTPATIENT SERVICES | Facility: HOSPITAL | Age: 28
LOS: 1 days | End: 2018-10-05
Payer: COMMERCIAL

## 2018-10-05 VITALS
HEIGHT: 65 IN | HEART RATE: 59 BPM | SYSTOLIC BLOOD PRESSURE: 125 MMHG | TEMPERATURE: 98 F | WEIGHT: 164.91 LBS | DIASTOLIC BLOOD PRESSURE: 81 MMHG | RESPIRATION RATE: 18 BRPM | OXYGEN SATURATION: 97 %

## 2018-10-05 VITALS
SYSTOLIC BLOOD PRESSURE: 98 MMHG | DIASTOLIC BLOOD PRESSURE: 60 MMHG | TEMPERATURE: 97 F | RESPIRATION RATE: 16 BRPM | OXYGEN SATURATION: 100 % | HEART RATE: 52 BPM

## 2018-10-05 DIAGNOSIS — N97.9 FEMALE INFERTILITY, UNSPECIFIED: ICD-10-CM

## 2018-10-05 PROCEDURE — 58970 RETRIEVAL OF OOCYTE: CPT

## 2018-10-05 PROCEDURE — 89250 CULTR OOCYTE/EMBRYO <4 DAYS: CPT

## 2018-10-05 PROCEDURE — 76830 TRANSVAGINAL US NON-OB: CPT

## 2018-10-05 PROCEDURE — 89280 ASSIST OOCYTE FERTILIZATION: CPT

## 2018-10-05 PROCEDURE — 89254 OOCYTE IDENTIFICATION: CPT

## 2018-10-05 PROCEDURE — 76948 ECHO GUIDE OVA ASPIRATION: CPT

## 2018-10-05 PROCEDURE — 99213 OFFICE O/P EST LOW 20 MIN: CPT | Mod: 25

## 2018-10-05 RX ORDER — OXYCODONE HYDROCHLORIDE 5 MG/1
5 TABLET ORAL ONCE
Refills: 0 | Status: DISCONTINUED | OUTPATIENT
Start: 2018-10-05 | End: 2018-10-05

## 2018-10-05 RX ORDER — CELECOXIB 200 MG/1
200 CAPSULE ORAL ONCE
Refills: 0 | Status: DISCONTINUED | OUTPATIENT
Start: 2018-10-05 | End: 2018-10-20

## 2018-10-05 RX ORDER — ONDANSETRON 8 MG/1
4 TABLET, FILM COATED ORAL ONCE
Refills: 0 | Status: DISCONTINUED | OUTPATIENT
Start: 2018-10-05 | End: 2018-10-20

## 2018-10-05 NOTE — ASU PREOP CHECKLIST - TO WHOM
Mary Grimes RN Mary Grimes RN  from  Anabelle Johnson rn Mary Grimes RN and Anabelle Molina RN from  Anabelle Cuellar RN

## 2018-10-05 NOTE — ASU DISCHARGE PLAN (ADULT/PEDIATRIC). - ITEMS TO FOLLOWUP WITH YOUR PHYSICIAN'S
Regular diet as tolerated, regular activity as tolerated, no heavy lifting for first two weeks.  Nothing per vagina: no intercourse, tampons or douching.  Call your provider if you experience fevers, chills, worsening abdominal pain, inability to urinate or worsening vaginal bleeding.  Follow up with your provider as scheduled.

## 2018-10-05 NOTE — ASU DISCHARGE PLAN (ADULT/PEDIATRIC). - NOTIFY
Bleeding that does not stop/Persistent Nausea and Vomiting/Unable to Urinate/Pain not relieved by Medications/GYN Fever>100.4/Inability to Tolerate Liquids or Foods

## 2018-10-05 NOTE — ASU DISCHARGE PLAN (ADULT/PEDIATRIC). - MEDICATION SUMMARY - MEDICATIONS TO TAKE
I will START or STAY ON the medications listed below when I get home from the hospital:    aspirin 325 mg oral tablet  -- Indication: For Home medication    acetaminophen 500 mg oral tablet  -- 2 tab(s) by mouth every 6 hours, As Needed - for severe pain  -- Indication: For Home medication, pain    Lovenox  -- 70  -- Indication: For Home medication

## 2018-10-05 NOTE — ASU DISCHARGE PLAN (ADULT/PEDIATRIC). - ACTIVITY LEVEL
no heavy lifting/nothing per rectum/nothing per vagina/no tub baths/no douching/no tampons/no intercourse

## 2018-10-08 PROCEDURE — 89253 EMBRYO HATCHING: CPT

## 2018-10-09 PROCEDURE — 89272 EXTENDED CULTURE OF OOCYTES: CPT

## 2018-10-10 PROCEDURE — 89258 CRYOPRESERVATION EMBRYO(S): CPT

## 2018-10-10 PROCEDURE — 89342 STORAGE/YEAR EMBRYO(S): CPT

## 2018-11-21 ENCOUNTER — APPOINTMENT (OUTPATIENT)
Dept: HUMAN REPRODUCTION | Facility: CLINIC | Age: 28
End: 2018-11-21

## 2019-06-03 ENCOUNTER — APPOINTMENT (OUTPATIENT)
Dept: PEDIATRIC CARDIOLOGY | Facility: CLINIC | Age: 29
End: 2019-06-03
Payer: COMMERCIAL

## 2019-06-03 ENCOUNTER — OUTPATIENT (OUTPATIENT)
Dept: OUTPATIENT SERVICES | Age: 29
LOS: 1 days | Discharge: HOME | End: 2019-06-03

## 2019-06-03 VITALS
DIASTOLIC BLOOD PRESSURE: 74 MMHG | BODY MASS INDEX: 26.96 KG/M2 | SYSTOLIC BLOOD PRESSURE: 127 MMHG | HEIGHT: 64.96 IN | WEIGHT: 161.82 LBS | HEART RATE: 58 BPM | RESPIRATION RATE: 18 BRPM | OXYGEN SATURATION: 97 %

## 2019-06-03 DIAGNOSIS — Z82.41 FAMILY HISTORY OF SUDDEN CARDIAC DEATH: ICD-10-CM

## 2019-06-03 PROCEDURE — 93325 DOPPLER ECHO COLOR FLOW MAPG: CPT

## 2019-06-03 PROCEDURE — 93303 ECHO TRANSTHORACIC: CPT

## 2019-06-03 PROCEDURE — 93320 DOPPLER ECHO COMPLETE: CPT

## 2019-06-03 PROCEDURE — 93000 ELECTROCARDIOGRAM COMPLETE: CPT

## 2019-06-03 PROCEDURE — 99215 OFFICE O/P EST HI 40 MIN: CPT | Mod: 25

## 2019-06-03 RX ORDER — CETRORELIX ACETATE 0.25 MG
0.25 KIT SUBCUTANEOUS
Qty: 3 | Refills: 1 | Status: DISCONTINUED | COMMUNITY
Start: 2018-09-12 | End: 2019-06-03

## 2019-06-03 RX ORDER — CHORIONIC GONADOTROPIN 10000 UNIT
10000 KIT INTRAMUSCULAR
Qty: 1 | Refills: 0 | Status: DISCONTINUED | COMMUNITY
Start: 2018-09-12 | End: 2019-06-03

## 2019-06-03 RX ORDER — LEUPROLIDE ACETATE 1 MG/0.2ML
1 KIT SUBCUTANEOUS
Qty: 1 | Refills: 1 | Status: DISCONTINUED | COMMUNITY
Start: 2018-03-20 | End: 2019-06-03

## 2019-06-03 RX ORDER — NEEDLES, SAFETY 22GX1 1/2"
22G X 1-1/2" NEEDLE, DISPOSABLE MISCELLANEOUS
Qty: 15 | Refills: 1 | Status: DISCONTINUED | COMMUNITY
Start: 2018-09-12 | End: 2019-06-03

## 2019-06-03 RX ORDER — NEEDLES, DISPOSABLE 25GX1"
27G X 1/2" NEEDLE, DISPOSABLE MISCELLANEOUS
Qty: 30 | Refills: 0 | Status: DISCONTINUED | COMMUNITY
Start: 2018-03-20 | End: 2019-06-03

## 2019-06-03 RX ORDER — DOXYCYCLINE HYCLATE 100 MG/1
100 TABLET ORAL
Qty: 10 | Refills: 1 | Status: DISCONTINUED | COMMUNITY
Start: 2018-07-12 | End: 2019-06-03

## 2019-06-03 RX ORDER — LETROZOLE TABLETS 2.5 MG/1
2.5 TABLET, FILM COATED ORAL TWICE DAILY
Qty: 14 | Refills: 0 | Status: DISCONTINUED | COMMUNITY
Start: 2018-05-04 | End: 2019-06-03

## 2019-06-03 RX ORDER — DOXYCYCLINE HYCLATE 100 MG/1
100 CAPSULE ORAL TWICE DAILY
Qty: 36 | Refills: 0 | Status: DISCONTINUED | COMMUNITY
Start: 2018-03-20 | End: 2019-06-03

## 2019-06-03 RX ORDER — METHYLPREDNISOLONE 8 MG/1
8 TABLET ORAL
Qty: 10 | Refills: 0 | Status: DISCONTINUED | COMMUNITY
Start: 2018-06-07 | End: 2019-06-03

## 2019-06-03 RX ORDER — UBIDECARENONE/VIT E ACET 100MG-5
CAPSULE ORAL
Refills: 0 | Status: DISCONTINUED | COMMUNITY
End: 2019-06-03

## 2019-06-03 RX ORDER — PROGESTERONE 50 MG/ML
50 INJECTION, SOLUTION INTRAMUSCULAR
Qty: 3 | Refills: 2 | Status: DISCONTINUED | COMMUNITY
Start: 2018-06-07 | End: 2019-06-03

## 2019-06-03 RX ORDER — NEEDLES, DISPOSABLE 25GX5/8"
27G X 1/2" NEEDLE, DISPOSABLE MISCELLANEOUS
Qty: 15 | Refills: 1 | Status: DISCONTINUED | COMMUNITY
Start: 2018-09-12 | End: 2019-06-03

## 2019-06-03 RX ORDER — METHYLPREDNISOLONE 16 MG/1
16 TABLET ORAL
Qty: 4 | Refills: 0 | Status: DISCONTINUED | COMMUNITY
Start: 2018-07-11 | End: 2019-06-03

## 2019-06-03 RX ORDER — NEEDLES, FILTER 19GX1 1/2"
22G X 1-1/2" NEEDLE, DISPOSABLE MISCELLANEOUS
Qty: 30 | Refills: 2 | Status: DISCONTINUED | COMMUNITY
Start: 2018-06-07 | End: 2019-06-03

## 2019-06-03 RX ORDER — CHORIONIC GONADOTROPIN 10000 UNIT
10000 KIT INTRAMUSCULAR
Qty: 1 | Refills: 0 | Status: DISCONTINUED | COMMUNITY
Start: 2018-03-20 | End: 2019-06-03

## 2019-06-03 RX ORDER — FOLLITROPIN ALFA 450 UNIT
450 KIT SUBCUTANEOUS
Qty: 1 | Refills: 1 | Status: DISCONTINUED | COMMUNITY
Start: 2018-05-02 | End: 2019-06-03

## 2019-06-03 RX ORDER — FOLLITROPIN ALFA 1050 UNIT
1050 KIT SUBCUTANEOUS
Qty: 2 | Refills: 1 | Status: DISCONTINUED | COMMUNITY
Start: 2018-09-12 | End: 2019-06-03

## 2019-06-03 RX ORDER — ESTRADIOL 0.1 MG/D
0.1 PATCH, EXTENDED RELEASE TRANSDERMAL
Qty: 10 | Refills: 2 | Status: DISCONTINUED | COMMUNITY
Start: 2018-06-07 | End: 2019-06-03

## 2019-06-03 RX ORDER — CHORIONIC GONADOTROPIN 10000 UNIT
10000 KIT INTRAMUSCULAR
Qty: 1 | Refills: 0 | Status: DISCONTINUED | COMMUNITY
Start: 2018-07-11 | End: 2019-06-03

## 2019-06-03 RX ORDER — CEPHALEXIN 500 MG/1
500 CAPSULE ORAL
Qty: 10 | Refills: 0 | Status: DISCONTINUED | COMMUNITY
Start: 2018-07-11 | End: 2019-06-03

## 2019-06-03 RX ORDER — LETROZOLE TABLETS 2.5 MG/1
2.5 TABLET, FILM COATED ORAL
Qty: 10 | Refills: 1 | Status: DISCONTINUED | COMMUNITY
Start: 2018-04-30 | End: 2019-06-03

## 2019-06-03 RX ORDER — ESTRADIOL 2 MG/1
2 TABLET ORAL
Qty: 120 | Refills: 2 | Status: DISCONTINUED | COMMUNITY
Start: 2018-06-07 | End: 2019-06-03

## 2019-06-03 RX ORDER — DOXYCYCLINE HYCLATE 100 MG/1
100 TABLET ORAL
Qty: 8 | Refills: 0 | Status: DISCONTINUED | COMMUNITY
Start: 2018-05-18 | End: 2019-06-03

## 2019-06-03 RX ORDER — CETRORELIX ACETATE 0.25 MG
0.25 KIT SUBCUTANEOUS
Qty: 5 | Refills: 1 | Status: DISCONTINUED | COMMUNITY
Start: 2018-03-20 | End: 2019-06-03

## 2019-06-03 RX ORDER — MENOTROPINS 75 UNIT
75 KIT SUBCUTANEOUS
Qty: 5 | Refills: 0 | Status: DISCONTINUED | COMMUNITY
Start: 2018-09-12 | End: 2019-06-03

## 2019-06-03 RX ORDER — LETROZOLE TABLETS 2.5 MG/1
2.5 TABLET, FILM COATED ORAL
Qty: 10 | Refills: 0 | Status: DISCONTINUED | COMMUNITY
Start: 2018-06-30 | End: 2019-06-03

## 2019-06-03 RX ORDER — NEEDLES, DISPOSABLE 25GX5/8"
18G X 1-1/2" NEEDLE, DISPOSABLE MISCELLANEOUS
Qty: 30 | Refills: 2 | Status: DISCONTINUED | COMMUNITY
Start: 2018-06-07 | End: 2019-06-03

## 2019-06-03 RX ORDER — CONTAINER,EMPTY
EACH MISCELLANEOUS
Qty: 1 | Refills: 2 | Status: DISCONTINUED | COMMUNITY
Start: 2018-06-07 | End: 2019-06-03

## 2019-06-03 RX ORDER — NORETHINDRONE AND ETHINYL ESTRADIOL 1 MG-35MCG
1-35 KIT ORAL DAILY
Qty: 1 | Refills: 1 | Status: DISCONTINUED | COMMUNITY
Start: 2018-08-28 | End: 2019-06-03

## 2019-06-03 RX ORDER — NEEDLES, DISPOSABLE 22GX1 1/2"
22G X 1-1/2" NEEDLE, DISPOSABLE MISCELLANEOUS
Qty: 30 | Refills: 0 | Status: DISCONTINUED | COMMUNITY
Start: 2018-03-20 | End: 2019-06-03

## 2019-06-03 RX ORDER — CABERGOLINE 0.5 MG/1
0.5 TABLET ORAL
Qty: 10 | Refills: 0 | Status: DISCONTINUED | COMMUNITY
Start: 2018-05-02 | End: 2019-06-03

## 2019-06-03 RX ORDER — FOLLITROPIN ALFA 1050 UNIT
1050 KIT SUBCUTANEOUS
Qty: 1 | Refills: 1 | Status: DISCONTINUED | COMMUNITY
Start: 2018-03-20 | End: 2019-06-03

## 2019-06-03 RX ORDER — ESTRADIOL 2 MG/1
2 TABLET ORAL
Qty: 60 | Refills: 0 | Status: DISCONTINUED | COMMUNITY
Start: 2018-05-18 | End: 2019-06-03

## 2019-06-03 RX ORDER — MENOTROPINS 75 UNIT
75 KIT SUBCUTANEOUS
Qty: 25 | Refills: 1 | Status: DISCONTINUED | COMMUNITY
Start: 2018-03-20 | End: 2019-06-03

## 2019-07-03 ENCOUNTER — APPOINTMENT (OUTPATIENT)
Dept: MRI IMAGING | Facility: HOSPITAL | Age: 29
End: 2019-07-03
Payer: COMMERCIAL

## 2019-07-03 ENCOUNTER — OUTPATIENT (OUTPATIENT)
Dept: OUTPATIENT SERVICES | Age: 29
LOS: 1 days | End: 2019-07-03

## 2019-07-03 DIAGNOSIS — Q20.1 DOUBLE OUTLET RIGHT VENTRICLE: ICD-10-CM

## 2019-07-03 PROCEDURE — 75565 CARD MRI VELOC FLOW MAPPING: CPT | Mod: 26

## 2019-07-03 PROCEDURE — 71555 MRI ANGIO CHEST W OR W/O DYE: CPT | Mod: 26

## 2019-07-03 PROCEDURE — 75561 CARDIAC MRI FOR MORPH W/DYE: CPT | Mod: 26

## 2019-11-11 ENCOUNTER — RX RENEWAL (OUTPATIENT)
Age: 29
End: 2019-11-11

## 2019-11-11 RX ORDER — ASPIRIN 325 MG/1
325 TABLET, FILM COATED ORAL
Refills: 0 | Status: DISCONTINUED | COMMUNITY
End: 2019-11-11

## 2019-11-12 ENCOUNTER — APPOINTMENT (OUTPATIENT)
Dept: OBGYN | Facility: CLINIC | Age: 29
End: 2019-11-12
Payer: COMMERCIAL

## 2019-11-12 PROCEDURE — 81025 URINE PREGNANCY TEST: CPT

## 2019-11-12 PROCEDURE — 99213 OFFICE O/P EST LOW 20 MIN: CPT

## 2019-11-12 PROCEDURE — 36415 COLL VENOUS BLD VENIPUNCTURE: CPT

## 2019-11-14 ENCOUNTER — APPOINTMENT (OUTPATIENT)
Dept: OBGYN | Facility: CLINIC | Age: 29
End: 2019-11-14
Payer: COMMERCIAL

## 2019-11-14 PROCEDURE — 76801 OB US < 14 WKS SINGLE FETUS: CPT

## 2019-11-16 ENCOUNTER — APPOINTMENT (OUTPATIENT)
Dept: OBGYN | Facility: CLINIC | Age: 29
End: 2019-11-16

## 2019-12-02 ENCOUNTER — APPOINTMENT (OUTPATIENT)
Dept: OBGYN | Facility: CLINIC | Age: 29
End: 2019-12-02
Payer: COMMERCIAL

## 2019-12-02 PROCEDURE — 76817 TRANSVAGINAL US OBSTETRIC: CPT

## 2019-12-04 ENCOUNTER — APPOINTMENT (OUTPATIENT)
Dept: PEDIATRIC CARDIOLOGY | Facility: CLINIC | Age: 29
End: 2019-12-04
Payer: COMMERCIAL

## 2019-12-04 VITALS
HEART RATE: 57 BPM | SYSTOLIC BLOOD PRESSURE: 125 MMHG | BODY MASS INDEX: 26.08 KG/M2 | DIASTOLIC BLOOD PRESSURE: 66 MMHG | HEIGHT: 64.96 IN | WEIGHT: 156.53 LBS | OXYGEN SATURATION: 98 %

## 2019-12-04 PROCEDURE — 93000 ELECTROCARDIOGRAM COMPLETE: CPT

## 2019-12-04 PROCEDURE — 99215 OFFICE O/P EST HI 40 MIN: CPT | Mod: 25

## 2019-12-04 RX ORDER — ENOXAPARIN SODIUM 100 MG/ML
40 INJECTION SUBCUTANEOUS
Qty: 14 | Refills: 0 | Status: DISCONTINUED | COMMUNITY
Start: 2018-03-22 | End: 2019-12-04

## 2019-12-04 RX ORDER — ASPIRIN 325 MG/1
325 TABLET, FILM COATED ORAL
Refills: 0 | Status: COMPLETED | COMMUNITY
End: 2019-12-04

## 2019-12-05 ENCOUNTER — TRANSCRIPTION ENCOUNTER (OUTPATIENT)
Age: 29
End: 2019-12-05

## 2019-12-05 ENCOUNTER — OUTPATIENT (OUTPATIENT)
Dept: OUTPATIENT SERVICES | Facility: HOSPITAL | Age: 29
LOS: 1 days | End: 2019-12-05

## 2019-12-05 VITALS
HEIGHT: 65 IN | TEMPERATURE: 97 F | HEART RATE: 54 BPM | WEIGHT: 160.06 LBS | SYSTOLIC BLOOD PRESSURE: 102 MMHG | DIASTOLIC BLOOD PRESSURE: 70 MMHG | RESPIRATION RATE: 14 BRPM

## 2019-12-05 DIAGNOSIS — O02.1 MISSED ABORTION: ICD-10-CM

## 2019-12-05 DIAGNOSIS — Z98.890 OTHER SPECIFIED POSTPROCEDURAL STATES: Chronic | ICD-10-CM

## 2019-12-05 LAB
ANION GAP SERPL CALC-SCNC: 12 MMO/L — SIGNIFICANT CHANGE UP (ref 7–14)
BLD GP AB SCN SERPL QL: NEGATIVE — SIGNIFICANT CHANGE UP
BUN SERPL-MCNC: 11 MG/DL — SIGNIFICANT CHANGE UP (ref 7–23)
CALCIUM SERPL-MCNC: 9.6 MG/DL — SIGNIFICANT CHANGE UP (ref 8.4–10.5)
CHLORIDE SERPL-SCNC: 105 MMOL/L — SIGNIFICANT CHANGE UP (ref 98–107)
CO2 SERPL-SCNC: 21 MMOL/L — LOW (ref 22–31)
CREAT SERPL-MCNC: 0.67 MG/DL — SIGNIFICANT CHANGE UP (ref 0.5–1.3)
GLUCOSE SERPL-MCNC: 95 MG/DL — SIGNIFICANT CHANGE UP (ref 70–99)
HCT VFR BLD CALC: 43.8 % — SIGNIFICANT CHANGE UP (ref 34.5–45)
HGB BLD-MCNC: 15 G/DL — SIGNIFICANT CHANGE UP (ref 11.5–15.5)
MCHC RBC-ENTMCNC: 31.6 PG — SIGNIFICANT CHANGE UP (ref 27–34)
MCHC RBC-ENTMCNC: 34.2 % — SIGNIFICANT CHANGE UP (ref 32–36)
MCV RBC AUTO: 92.2 FL — SIGNIFICANT CHANGE UP (ref 80–100)
NRBC # FLD: 0 K/UL — SIGNIFICANT CHANGE UP (ref 0–0)
PLATELET # BLD AUTO: 192 K/UL — SIGNIFICANT CHANGE UP (ref 150–400)
PMV BLD: 11.1 FL — SIGNIFICANT CHANGE UP (ref 7–13)
POTASSIUM SERPL-MCNC: 3.8 MMOL/L — SIGNIFICANT CHANGE UP (ref 3.5–5.3)
POTASSIUM SERPL-SCNC: 3.8 MMOL/L — SIGNIFICANT CHANGE UP (ref 3.5–5.3)
RBC # BLD: 4.75 M/UL — SIGNIFICANT CHANGE UP (ref 3.8–5.2)
RBC # FLD: 12.5 % — SIGNIFICANT CHANGE UP (ref 10.3–14.5)
RH IG SCN BLD-IMP: POSITIVE — SIGNIFICANT CHANGE UP
SODIUM SERPL-SCNC: 138 MMOL/L — SIGNIFICANT CHANGE UP (ref 135–145)
WBC # BLD: 5.74 K/UL — SIGNIFICANT CHANGE UP (ref 3.8–10.5)
WBC # FLD AUTO: 5.74 K/UL — SIGNIFICANT CHANGE UP (ref 3.8–10.5)

## 2019-12-05 NOTE — H&P PST ADULT - ATTENDING COMMENTS
OBGYN Attending    P0 diagnosed with MAB at 6w4d. H/o congential heart disease s/p surgery and unicornuate uterus. She has been cleared by cardiology and anesthesia for procedure in main OR. She accepts blood.     N Sample[place]

## 2019-12-05 NOTE — ASU PATIENT PROFILE, ADULT - PMH
Congenital heart disease    Dextrocardia    DVT, lower extremity  "the right I think"-2005/2006  HPV in female    Ventricular septal defect (VSD)

## 2019-12-05 NOTE — H&P PST ADULT - NSANTHOSAYNRD_GEN_A_CORE
No. RANDAL screening performed.  STOP BANG Legend: 0-2 = LOW Risk; 3-4 = INTERMEDIATE Risk; 5-8 = HIGH Risk

## 2019-12-05 NOTE — H&P PST ADULT - NSICDXPROBLEM_GEN_ALL_CORE_FT
PROBLEM DIAGNOSES  Problem: Missed   Assessment and Plan: Pt. is scheduled for a DVC 19.  Pt. verbalized understanding of instructions as discussed and outlined on the instruction sheets.  Discussed with Dr. Antonio.

## 2019-12-05 NOTE — H&P PST ADULT - NSICDXPASTMEDICALHX_GEN_ALL_CORE_FT
PAST MEDICAL HISTORY:  Congenital heart disease     Dextrocardia     DVT, lower extremity "the right I think"-2005/2006    HPV in female     Ventricular septal defect (VSD)

## 2019-12-05 NOTE — H&P PST ADULT - ASSESSMENT
Pt. is a 30 yo pregnant female with a missed .    Pt. had cardiac clearance 19 and was instructed at that time to take last dose of both ASA and Lovenox today, 19.

## 2019-12-06 ENCOUNTER — RESULT REVIEW (OUTPATIENT)
Age: 29
End: 2019-12-06

## 2019-12-06 ENCOUNTER — OUTPATIENT (OUTPATIENT)
Dept: OUTPATIENT SERVICES | Facility: HOSPITAL | Age: 29
LOS: 1 days | Discharge: HOME | End: 2019-12-06
Payer: COMMERCIAL

## 2019-12-06 VITALS
OXYGEN SATURATION: 96 % | HEART RATE: 54 BPM | HEIGHT: 65 IN | DIASTOLIC BLOOD PRESSURE: 74 MMHG | SYSTOLIC BLOOD PRESSURE: 128 MMHG | TEMPERATURE: 98 F | WEIGHT: 160.06 LBS | RESPIRATION RATE: 16 BRPM

## 2019-12-06 VITALS
HEART RATE: 57 BPM | OXYGEN SATURATION: 96 % | SYSTOLIC BLOOD PRESSURE: 107 MMHG | DIASTOLIC BLOOD PRESSURE: 57 MMHG | RESPIRATION RATE: 18 BRPM

## 2019-12-06 DIAGNOSIS — Z98.890 OTHER SPECIFIED POSTPROCEDURAL STATES: Chronic | ICD-10-CM

## 2019-12-06 DIAGNOSIS — O02.1 MISSED ABORTION: ICD-10-CM

## 2019-12-06 LAB
HCG UR QL: POSITIVE — SIGNIFICANT CHANGE UP
RH IG SCN BLD-IMP: POSITIVE — SIGNIFICANT CHANGE UP

## 2019-12-06 PROCEDURE — 88305 TISSUE EXAM BY PATHOLOGIST: CPT | Mod: 26

## 2019-12-06 PROCEDURE — 59820 CARE OF MISCARRIAGE: CPT

## 2019-12-06 RX ORDER — IBUPROFEN 200 MG
1 TABLET ORAL
Qty: 0 | Refills: 0 | DISCHARGE
Start: 2019-12-06

## 2019-12-06 RX ORDER — ACETAMINOPHEN 500 MG
975 TABLET ORAL EVERY 6 HOURS
Refills: 0 | Status: DISCONTINUED | OUTPATIENT
Start: 2019-12-06 | End: 2020-01-03

## 2019-12-06 RX ORDER — ACETAMINOPHEN 500 MG
650 TABLET ORAL ONCE
Refills: 0 | Status: DISCONTINUED | OUTPATIENT
Start: 2019-12-06 | End: 2020-01-03

## 2019-12-06 RX ORDER — ACETAMINOPHEN 500 MG
3 TABLET ORAL
Qty: 0 | Refills: 0 | DISCHARGE
Start: 2019-12-06

## 2019-12-06 RX ORDER — SODIUM CHLORIDE 9 MG/ML
1000 INJECTION, SOLUTION INTRAVENOUS
Refills: 0 | Status: DISCONTINUED | OUTPATIENT
Start: 2019-12-06 | End: 2020-01-03

## 2019-12-06 RX ORDER — IBUPROFEN 200 MG
600 TABLET ORAL EVERY 6 HOURS
Refills: 0 | Status: DISCONTINUED | OUTPATIENT
Start: 2019-12-06 | End: 2020-01-03

## 2019-12-06 RX ADMIN — SODIUM CHLORIDE 125 MILLILITER(S): 9 INJECTION, SOLUTION INTRAVENOUS at 13:35

## 2019-12-06 NOTE — ASU DISCHARGE PLAN (ADULT/PEDIATRIC) - CARE PROVIDER_API CALL
Liliane Ledesma; MPH)  Kellie FITZGERALD  [address], [address]  [place] [address]  Phone: [phone]  Fax: [phone]  Follow Up Time:

## 2019-12-06 NOTE — ASU DISCHARGE PLAN (ADULT/PEDIATRIC) - CALL YOUR DOCTOR IF YOU HAVE ANY OF THE FOLLOWING:
Bleeding that does not stop/Pain not relieved by Medications/Fever greater than (need to indicate Fahrenheit or Celsius)/Nausea and vomiting that does not stop Bleeding that does not stop/Swelling that gets worse/Pain not relieved by Medications/Fever greater than (need to indicate Fahrenheit or Celsius)/Nausea and vomiting that does not stop/Unable to urinate

## 2019-12-06 NOTE — ASU DISCHARGE PLAN (ADULT/PEDIATRIC) - ASU DC SPECIAL INSTRUCTIONSFT
Nothing per vagina for 2 weeks until post op check  Please call the office to schedule post op visit within 2 weeks Nothing per vagina for 2 weeks until post op check  Please call the office to schedule post op visit within 2 weeks  Please take next dose of Lovenox tonight at 7PM and continue twice daily dosing for two weeks

## 2019-12-06 NOTE — ASU DISCHARGE PLAN (ADULT/PEDIATRIC) - NURSING INSTRUCTIONS
No Ibuprofen/ Motrin/ Advil until after 7pm. You received Toradol in the OR at 1pm. If you saturate more than 1 pad for 2 hours call your provider.

## 2019-12-23 ENCOUNTER — APPOINTMENT (OUTPATIENT)
Dept: OBGYN | Facility: CLINIC | Age: 29
End: 2019-12-23

## 2021-04-28 ENCOUNTER — NON-APPOINTMENT (OUTPATIENT)
Age: 31
End: 2021-04-28

## 2021-04-28 ENCOUNTER — APPOINTMENT (OUTPATIENT)
Dept: OBGYN | Facility: CLINIC | Age: 31
End: 2021-04-28
Payer: COMMERCIAL

## 2021-04-28 PROCEDURE — 99072 ADDL SUPL MATRL&STAF TM PHE: CPT

## 2021-04-28 PROCEDURE — 99395 PREV VISIT EST AGE 18-39: CPT

## 2021-05-12 DIAGNOSIS — Q24.9 DISEASES OF THE CIRCULATORY SYSTEM COMPLICATING PREGNANCY, FIRST TRIMESTER: ICD-10-CM

## 2021-05-12 DIAGNOSIS — O99.411 DISEASES OF THE CIRCULATORY SYSTEM COMPLICATING PREGNANCY, FIRST TRIMESTER: ICD-10-CM

## 2021-05-14 ENCOUNTER — LABORATORY RESULT (OUTPATIENT)
Age: 31
End: 2021-05-14

## 2021-05-29 ENCOUNTER — EMERGENCY (EMERGENCY)
Facility: HOSPITAL | Age: 31
LOS: 1 days | Discharge: HOME | End: 2021-05-29
Attending: EMERGENCY MEDICINE | Admitting: EMERGENCY MEDICINE
Payer: COMMERCIAL

## 2021-05-29 VITALS
HEART RATE: 51 BPM | SYSTOLIC BLOOD PRESSURE: 121 MMHG | RESPIRATION RATE: 17 BRPM | TEMPERATURE: 98 F | OXYGEN SATURATION: 96 % | DIASTOLIC BLOOD PRESSURE: 69 MMHG

## 2021-05-29 VITALS
RESPIRATION RATE: 16 BRPM | TEMPERATURE: 98 F | SYSTOLIC BLOOD PRESSURE: 153 MMHG | OXYGEN SATURATION: 100 % | HEIGHT: 65 IN | DIASTOLIC BLOOD PRESSURE: 82 MMHG | HEART RATE: 72 BPM

## 2021-05-29 DIAGNOSIS — Z98.890 OTHER SPECIFIED POSTPROCEDURAL STATES: Chronic | ICD-10-CM

## 2021-05-29 LAB
ALBUMIN SERPL ELPH-MCNC: 4.6 G/DL — SIGNIFICANT CHANGE UP (ref 3.3–5)
ALP SERPL-CCNC: 40 U/L — SIGNIFICANT CHANGE UP (ref 40–120)
ALT FLD-CCNC: 49 U/L — HIGH (ref 4–33)
ANION GAP SERPL CALC-SCNC: 13 MMOL/L — SIGNIFICANT CHANGE UP (ref 7–14)
APPEARANCE UR: CLEAR — SIGNIFICANT CHANGE UP
APTT BLD: 37.4 SEC — HIGH (ref 27–36.3)
AST SERPL-CCNC: 36 U/L — HIGH (ref 4–32)
BASOPHILS # BLD AUTO: 0.02 K/UL — SIGNIFICANT CHANGE UP (ref 0–0.2)
BASOPHILS NFR BLD AUTO: 0.3 % — SIGNIFICANT CHANGE UP (ref 0–2)
BILIRUB SERPL-MCNC: 0.4 MG/DL — SIGNIFICANT CHANGE UP (ref 0.2–1.2)
BILIRUB UR-MCNC: NEGATIVE — SIGNIFICANT CHANGE UP
BLD GP AB SCN SERPL QL: NEGATIVE — SIGNIFICANT CHANGE UP
BUN SERPL-MCNC: 18 MG/DL — SIGNIFICANT CHANGE UP (ref 7–23)
CALCIUM SERPL-MCNC: 9.9 MG/DL — SIGNIFICANT CHANGE UP (ref 8.4–10.5)
CHLORIDE SERPL-SCNC: 104 MMOL/L — SIGNIFICANT CHANGE UP (ref 98–107)
CO2 SERPL-SCNC: 23 MMOL/L — SIGNIFICANT CHANGE UP (ref 22–31)
COLOR SPEC: SIGNIFICANT CHANGE UP
CREAT SERPL-MCNC: 0.88 MG/DL — SIGNIFICANT CHANGE UP (ref 0.5–1.3)
DIFF PNL FLD: ABNORMAL
EOSINOPHIL # BLD AUTO: 0.31 K/UL — SIGNIFICANT CHANGE UP (ref 0–0.5)
EOSINOPHIL NFR BLD AUTO: 5 % — SIGNIFICANT CHANGE UP (ref 0–6)
GLUCOSE SERPL-MCNC: 122 MG/DL — HIGH (ref 70–99)
GLUCOSE UR QL: NEGATIVE — SIGNIFICANT CHANGE UP
HCG SERPL-ACNC: SIGNIFICANT CHANGE UP MIU/ML
HCT VFR BLD CALC: 43.8 % — SIGNIFICANT CHANGE UP (ref 34.5–45)
HGB BLD-MCNC: 14.8 G/DL — SIGNIFICANT CHANGE UP (ref 11.5–15.5)
IANC: 3.72 K/UL — SIGNIFICANT CHANGE UP (ref 1.5–8.5)
IMM GRANULOCYTES NFR BLD AUTO: 0.5 % — SIGNIFICANT CHANGE UP (ref 0–1.5)
INR BLD: 1.06 RATIO — SIGNIFICANT CHANGE UP (ref 0.88–1.16)
KETONES UR-MCNC: NEGATIVE — SIGNIFICANT CHANGE UP
LEUKOCYTE ESTERASE UR-ACNC: NEGATIVE — SIGNIFICANT CHANGE UP
LYMPHOCYTES # BLD AUTO: 1.57 K/UL — SIGNIFICANT CHANGE UP (ref 1–3.3)
LYMPHOCYTES # BLD AUTO: 25.1 % — SIGNIFICANT CHANGE UP (ref 13–44)
MCHC RBC-ENTMCNC: 31.2 PG — SIGNIFICANT CHANGE UP (ref 27–34)
MCHC RBC-ENTMCNC: 33.8 GM/DL — SIGNIFICANT CHANGE UP (ref 32–36)
MCV RBC AUTO: 92.4 FL — SIGNIFICANT CHANGE UP (ref 80–100)
MONOCYTES # BLD AUTO: 0.6 K/UL — SIGNIFICANT CHANGE UP (ref 0–0.9)
MONOCYTES NFR BLD AUTO: 9.6 % — SIGNIFICANT CHANGE UP (ref 2–14)
NEUTROPHILS # BLD AUTO: 3.72 K/UL — SIGNIFICANT CHANGE UP (ref 1.8–7.4)
NEUTROPHILS NFR BLD AUTO: 59.5 % — SIGNIFICANT CHANGE UP (ref 43–77)
NITRITE UR-MCNC: NEGATIVE — SIGNIFICANT CHANGE UP
NRBC # BLD: 0 /100 WBCS — SIGNIFICANT CHANGE UP
NRBC # FLD: 0 K/UL — SIGNIFICANT CHANGE UP
PH UR: 6.5 — SIGNIFICANT CHANGE UP (ref 5–8)
PLATELET # BLD AUTO: 169 K/UL — SIGNIFICANT CHANGE UP (ref 150–400)
POTASSIUM SERPL-MCNC: 4.9 MMOL/L — SIGNIFICANT CHANGE UP (ref 3.5–5.3)
POTASSIUM SERPL-SCNC: 4.9 MMOL/L — SIGNIFICANT CHANGE UP (ref 3.5–5.3)
PROT SERPL-MCNC: 7.5 G/DL — SIGNIFICANT CHANGE UP (ref 6–8.3)
PROT UR-MCNC: NEGATIVE — SIGNIFICANT CHANGE UP
PROTHROM AB SERPL-ACNC: 12 SEC — SIGNIFICANT CHANGE UP (ref 10.6–13.6)
RBC # BLD: 4.74 M/UL — SIGNIFICANT CHANGE UP (ref 3.8–5.2)
RBC # FLD: 12.7 % — SIGNIFICANT CHANGE UP (ref 10.3–14.5)
RH IG SCN BLD-IMP: POSITIVE — SIGNIFICANT CHANGE UP
SODIUM SERPL-SCNC: 140 MMOL/L — SIGNIFICANT CHANGE UP (ref 135–145)
SP GR SPEC: 1.01 — SIGNIFICANT CHANGE UP (ref 1.01–1.02)
UROBILINOGEN FLD QL: SIGNIFICANT CHANGE UP
WBC # BLD: 6.25 K/UL — SIGNIFICANT CHANGE UP (ref 3.8–10.5)
WBC # FLD AUTO: 6.25 K/UL — SIGNIFICANT CHANGE UP (ref 3.8–10.5)

## 2021-05-29 PROCEDURE — 76817 TRANSVAGINAL US OBSTETRIC: CPT | Mod: 26

## 2021-05-29 PROCEDURE — 99285 EMERGENCY DEPT VISIT HI MDM: CPT

## 2021-05-29 PROCEDURE — 99242 OFF/OP CONSLTJ NEW/EST SF 20: CPT

## 2021-05-29 NOTE — ED PROVIDER NOTE - NS ED ROS FT
In additional the that documented in the HPI, the additional ROS was obtained:    CONSTITUTIONAL: No fever, no chills  EYES: no change in vision, no blurred vision  HEENT: no trouble swallowing, no trouble speaking, no sore throat  CV: no chest pain, no palpitations  RESP: no cough, no shortness of breath  GI: no abdominal pain, no nausea, no vomiting, no diarrhea, no constipation  : No dysuria, no frequency  NEURO: no headache, no new numbness, no weakness, no dizziness  SKIN: no new rashes  HEME: No easy bruising or bleeding  MSK: no recent trauma  Raghav Crenshaw M.D. -Resident

## 2021-05-29 NOTE — ED PROVIDER NOTE - PHYSICAL EXAMINATION
Vital signs reviewed.  CONSTITUTIONAL: NAD, awake, somewhat anxious appearing.  HEAD: Normocephalic; atraumatic  EYES: EOMI, no conjunctival injection, no scleral icterus  MOUTH/THROAT:  MMM, trachea midline  NECK: Trachea midline  CV: Normal S1, S2; no audible murmurs; extremities WWP  RESP: normal work of breathing; CTAB, no stridor  ABD: soft, non-distended; non-tender  : Deferred  MSK/EXT: no edema, no limited ROM  SKIN: No rashes on exposed skin surfaces  NEURO: Moves all extremities spontaneously with no focal deficits, speech is appropriate

## 2021-05-29 NOTE — ED PROVIDER NOTE - PROGRESS NOTE DETAILS
Pelvic exam chaperoned by ED truman Polk.  Fresh blood pooled in vaginal vault, small amount cleared away with single 4x4 pledge.  Open cervical os with blood and mucus coming from it.  No beatriz fetal tissue seen.  No cervical discharge, no CMT, no uterine or adnexal tenderness. Pelvic exam chaperoned by ED truman Polk.  Fresh blood pooled in vaginal vault, small amount cleared away with single 4x4 pledge.  Closed cervical os with blood and mucus coming from it.  No beatriz fetal tissue seen.  No cervical discharge, no CMT, no uterine or adnexal tenderness.

## 2021-05-29 NOTE — ED PROVIDER NOTE - ATTENDING CONTRIBUTION TO CARE
I performed a face-to-face evaluation of the patient and performed a history and physical examination. I agree with the history and physical examination.    6 wks preg., heavy VB (passed a clot). Concern for miscarriage. Check HCG quant, TVUS, UA, UCx, T & S, Hb.

## 2021-05-29 NOTE — ED PROVIDER NOTE - CLINICAL SUMMARY MEDICAL DECISION MAKING FREE TEXT BOX
31F pmh congen heart dz s/p fontan, dvt/pe on lovenox, 6 weeks pregnant who presents after significnat vaginal bleeding concerning for miscarriage.  Will get labs including serum b-hcg, type and screen, transvaginal u/s.

## 2021-05-29 NOTE — ED PROVIDER NOTE - OBJECTIVE STATEMENT
31F , pmh congenital heart dz s/p Fontan procedure, dextrocardia, DVT and PE s/p clot ablation on lovenox who presents with sudden onset significant vaginal bleeding today at home, bled through pad and soaked her pants and couch at 7pm.  She noted a large blood clot afterward, and since then has had only minimal vaginal spotting bleeding.  She showered after the bleeding.  She has had several positive home urine pregnancy tests and has an appointment with her OBGyn but not yet seen them, she believes she is ~6 weeks pregnant.  Hx 1 prior miscarriage several years ago.  Otherwise feeling well, no abd pain no nausea/vomiting, no lightheadedness or dizziness, no fevers, no CP or SOB. No urinary symptoms.    LMP apr 15, 2021.  OBCate Hopkins.

## 2021-05-29 NOTE — ED ADULT NURSE NOTE - OBJECTIVE STATEMENT
Received a 6weeks pregnant pt with c/o vag bleed x 1hr PTA. Pt states bleeding was profuse then she passed "a thick blood" unsure if it was a clot or product of conception, states bleeding stayed controlled after passing the product. No abdominal pain reported. No headache. dizziness or lightheadedness reported. Labs sent. 20g angiocath placed on R ac.

## 2021-05-29 NOTE — ED PROCEDURE NOTE - ATTENDING CONTRIBUTION TO CARE
I performed a face-to-face evaluation of the patient and performed a history and physical examination. I agree with the history and physical examination.    Mame: I was present during the critical part of the procedure.

## 2021-05-29 NOTE — ED PROVIDER NOTE - CARE PROVIDER_API CALL
Catarino Hopkins)  MaternalFetal Medicine; Obstetrics and Gynecology  [address] [place] [id number]  Phone: [phone]  Fax: [phone]  Established Patient  Follow Up Time: 4-6 Days

## 2021-05-29 NOTE — CONSULT NOTE ADULT - SUBJECTIVE AND OBJECTIVE BOX
NEMESIO ARMSTRONG  31y  Female [id number]    HPI: 30yo  @6w2d p/f sudden onset vaginal bleeding at 7p with passage of large clot with minimal bleeding since. Pt at this time has no active complaints. This is a wanted pregnancy. denies fever, chills, nausea, vomiting, diarrhea, headache, constipation, dizziness, syncope, chest pain, palpitations, shortness of breath, dysuria, urgency, frequency, abdominal/pelvic pain.    Name of GYN Physician:   POB:  SABx1 s/p Dilation and Curettage   Pgyn: unicornuate uterus    Home meds:   Lovenox (Dose unclear)    Allergies  amoxicillin (Rash)  penicillin (Rash)  vancomycin (Other)    PAST MEDICAL & SURGICAL HISTORY:  DVT, lower extremity  Congenital heart disease  Ventricular septal defect (VSD)  Dextrocardia  HPV in female  S/P Fontan procedure      Social History:  Denies smoking use, drug use, alcohol use.     Vital Signs Last 24 Hrs  T(C): 36.7 (29 May 2021 21:58), Max: 36.9 (29 May 2021 19:58)  T(F): 98 (29 May 2021 21:58), Max: 98.4 (29 May 2021 19:58)  HR: 51 (29 May 2021 21:58) (51 - 72)  BP: 121/69 (29 May 2021 21:58) (121/69 - 153/82)  BP(mean): --  RR: 17 (29 May 2021 21:58) (16 - 18)  SpO2: 96% (29 May 2021 21:58) (96% - 100%)    Physical Exam:   General: sitting comftorably in bed, NAD   HEENT: neck supple, full ROM  CV: RR S1S2 no m/r/g  Lungs: CTA b/l, good air flow b/l   Back: No CVA tenderness  Abd: Soft, non-tender, non-distended.  Bowel sounds present.    :  Minimal bleeding on pad.    External labia wnl.  Bimanual exam with no cervical motion tenderness, uterus wnl, adnexa non palpable b/l.  Cervix closed   Speculum Exam: minimal bleeding from os.    Ext: non-tender b/l, no edema     LABS:                          14.8   6.25  )-----------( 169      ( 29 May 2021 20:48 )             43.8         140  |  104  |  18  ----------------------------<  122<H>  4.9   |  23  |  0.88    Ca    9.9      29 May 2021 20:48    TPro  7.5  /  Alb  4.6  /  TBili  0.4  /  DBili  x   /  AST  36<H>  /  ALT  49<H>  /  AlkPhos  40      I&O's Detail    PT/INR - ( 29 May 2021 20:48 )   PT: 12.0 sec;   INR: 1.06 ratio    PTT - ( 29 May 2021 20:48 )  PTT:37.4 sec    HCG Quantitative, Serum: 47387.0: F    RADIOLOGY & ADDITIONAL STUDIES:  []TVUS Pending NEMESIO ARMSTRONG  31y  Female [id number]    HPI: 30yo  @6w2d p/f sudden onset vaginal bleeding at 7p with passage of large clot with minimal bleeding since. Pt at this time has no active complaints. This is a wanted pregnancy. denies fever, chills, nausea, vomiting, diarrhea, headache, constipation, dizziness, syncope, chest pain, palpitations, shortness of breath, dysuria, urgency, frequency, abdominal/pelvic pain.    Name of GYN Physician:   POB:  SABx1 s/p Dilation and Curettage   Pgyn: unicornuate uterus    Home meds:   Lovenox (Dose unclear)    Allergies  amoxicillin (Rash)  penicillin (Rash)  vancomycin (Other)    PAST MEDICAL & SURGICAL HISTORY:  DVT, lower extremity  Congenital heart disease  Ventricular septal defect (VSD)  Dextrocardia  HPV in female  S/P Fontan procedure      Social History:  Denies smoking use, drug use, alcohol use.     Vital Signs Last 24 Hrs  T(C): 36.7 (29 May 2021 21:58), Max: 36.9 (29 May 2021 19:58)  T(F): 98 (29 May 2021 21:58), Max: 98.4 (29 May 2021 19:58)  HR: 51 (29 May 2021 21:58) (51 - 72)  BP: 121/69 (29 May 2021 21:58) (121/69 - 153/82)  BP(mean): --  RR: 17 (29 May 2021 21:58) (16 - 18)  SpO2: 96% (29 May 2021 21:58) (96% - 100%)    Physical Exam:   General: sitting comftorably in bed, NAD   HEENT: neck supple, full ROM  CV: RR S1S2 no m/r/g  Lungs: CTA b/l, good air flow b/l   Back: No CVA tenderness  Abd: Soft, non-tender, non-distended.  Bowel sounds present.    :  Minimal bleeding on pad.    External labia wnl.  Bimanual exam with no cervical motion tenderness, uterus wnl, adnexa non palpable b/l.  Cervix closed   Speculum Exam: minimal bleeding from os.    Ext: non-tender b/l, no edema     LABS:                          14.8   6.25  )-----------( 169      ( 29 May 2021 20:48 )             43.8         140  |  104  |  18  ----------------------------<  122<H>  4.9   |  23  |  0.88    Ca    9.9      29 May 2021 20:48    TPro  7.5  /  Alb  4.6  /  TBili  0.4  /  DBili  x   /  AST  36<H>  /  ALT  49<H>  /  AlkPhos  40      I&O's Detail    PT/INR - ( 29 May 2021 20:48 )   PT: 12.0 sec;   INR: 1.06 ratio    PTT - ( 29 May 2021 20:48 )  PTT:37.4 sec    HCG Quantitative, Serum: 85941.0: F    RADIOLOGY & ADDITIONAL STUDIES:  < from: US Transvaginal, OB (21 @ 23:56) >  COMPARISON: None available.    Endovaginal pelvic sonogram only. Color and Spectral Doppler was performed.    FINDINGS:  Uterus: A single intrauterine pregnancy.    Gestational Sac Size (mean): 1.6 cm  Gestations Sac Shape : Normal.  Crown Rump Length: 4.8mm  Estimated Gestational Age: 6 weeks 2 days by crown rump length.  Yolk Sac: Present.  Fetal Heart Rate: Not detected.    Right ovary: 3.4 cm x 2.2 cm x 2.1 cm. Within normal limits.  Left ovary: 5.0 cm x 2.5 cm x 4.1 cm. 3.3 x 2.2 x 3.6 cm septated cyst versus 2 adjacent cysts measuring 2.2 x 1.8 and 1.8 x 1.2 cm. Normal arterial and venous waveforms.    Fluid: Trace to small free fluid    IMPRESSION:  Early intrauterine gestation at 6 weeks 2 days by CRL. Fetal cardiac activity not yet detected. Continual spondylosis serial beta hCGs with repeat ultrasonography at 7-14 days to assess for viability is recommended.    < end of copied text >   NEMESIO ARMSTRONG  31y  Female [id number]    HPI: 32yo  @6w2d presenting with sudden onset vaginal bleeding at 7p with passage of large clot with minimal bleeding since. Pt at this time has no active complaints. This is a wanted pregnancy. denies fever, chills, nausea, vomiting, diarrhea, headache, constipation, dizziness, syncope, chest pain, palpitations, shortness of breath, dysuria, urgency, frequency, abdominal/pelvic pain.    Name of GYN Physician:   POB:  SABx1 s/p Dilation and Curettage   Pgyn: unicornuate uterus    Home meds:   Lovenox (Dose unclear)    Allergies  amoxicillin (Rash)  penicillin (Rash)  vancomycin (Other)    PAST MEDICAL & SURGICAL HISTORY:  DVT, lower extremity  Congenital heart disease  Ventricular septal defect (VSD)  Dextrocardia  HPV in female  S/P Fontan procedure      Social History:  Denies smoking use, drug use, alcohol use.     Vital Signs Last 24 Hrs  T(C): 36.7 (29 May 2021 21:58), Max: 36.9 (29 May 2021 19:58)  T(F): 98 (29 May 2021 21:58), Max: 98.4 (29 May 2021 19:58)  HR: 51 (29 May 2021 21:58) (51 - 72)  BP: 121/69 (29 May 2021 21:58) (121/69 - 153/82)  BP(mean): --  RR: 17 (29 May 2021 21:58) (16 - 18)  SpO2: 96% (29 May 2021 21:58) (96% - 100%)    Physical Exam:   General: sitting comftorably in bed, NAD   HEENT: neck supple, full ROM  CV: RR S1S2 no m/r/g  Lungs: CTA b/l, good air flow b/l   Back: No CVA tenderness  Abd: Soft, non-tender, non-distended.  Bowel sounds present.    :  Minimal bleeding on pad.    External labia wnl.  Bimanual exam with no cervical motion tenderness, uterus wnl, adnexa non palpable b/l.  Cervix closed   Speculum Exam: minimal bleeding from os.    Ext: non-tender b/l, no edema     LABS:                          14.8   6.25  )-----------( 169      ( 29 May 2021 20:48 )             43.8         140  |  104  |  18  ----------------------------<  122<H>  4.9   |  23  |  0.88    Ca    9.9      29 May 2021 20:48    TPro  7.5  /  Alb  4.6  /  TBili  0.4  /  DBili  x   /  AST  36<H>  /  ALT  49<H>  /  AlkPhos  40      I&O's Detail    PT/INR - ( 29 May 2021 20:48 )   PT: 12.0 sec;   INR: 1.06 ratio    PTT - ( 29 May 2021 20:48 )  PTT:37.4 sec    HCG Quantitative, Serum: 90871.0: F    RADIOLOGY & ADDITIONAL STUDIES:  < from: US Transvaginal, OB (21 @ 23:56) >  COMPARISON: None available.    Endovaginal pelvic sonogram only. Color and Spectral Doppler was performed.    FINDINGS:  Uterus: A single intrauterine pregnancy.    Gestational Sac Size (mean): 1.6 cm  Gestations Sac Shape : Normal.  Crown Rump Length: 4.8mm  Estimated Gestational Age: 6 weeks 2 days by crown rump length.  Yolk Sac: Present.  Fetal Heart Rate: Not detected.    Right ovary: 3.4 cm x 2.2 cm x 2.1 cm. Within normal limits.  Left ovary: 5.0 cm x 2.5 cm x 4.1 cm. 3.3 x 2.2 x 3.6 cm septated cyst versus 2 adjacent cysts measuring 2.2 x 1.8 and 1.8 x 1.2 cm. Normal arterial and venous waveforms.    Fluid: Trace to small free fluid    IMPRESSION:  Early intrauterine gestation at 6 weeks 2 days by CRL. Fetal cardiac activity not yet detected. Continual spondylosis serial beta hCGs with repeat ultrasonography at 7-14 days to assess for viability is recommended.    < end of copied text >

## 2021-05-29 NOTE — ED ADULT TRIAGE NOTE - CHIEF COMPLAINT QUOTE
states" I am 6 weeks pregnant and I am bleeding since today" patient takes Lovenox for chronic DVT in her legs. h/o congenital heart disease with Sandor  procedure as child. denies having any pain.

## 2021-05-29 NOTE — CONSULT NOTE ADULT - ASSESSMENT
30yo  @6w2d p/f sudden onset vaginal bleeding at 7p c/f SAB vs Incomplete AB  - f/u TVUS  -INCOMPLETE NOTE    Víctor Ramírez PGY2   30yo  @6w2d p/f sudden onset vaginal bleeding at 7p c/f SAB vs Incomplete AB  - sono with IUP, fetal CRL measuring 6w2d with no fetal cardiac motion   - most likely missed AB   - RH positive  - pt can f/u outpatient this week      Víctor Ramírez PGY2     [id number]

## 2021-05-29 NOTE — ED PROVIDER NOTE - PATIENT PORTAL LINK FT
You can access the FollowMyHealth Patient Portal offered by [facility] by registering at the following website: [email or web address]. By joining [facility]’s FollowMyHealth portal, you will also be able to view your health information using other applications (apps) compatible with our system.

## 2021-05-29 NOTE — CONSULT NOTE ADULT - ATTENDING COMMENTS
OB Attending    P0 with 6weeks, early IUP with vaginal bleeding. Presentation and management discussed with resident  -ddx early IUP vs missed   -f/u in the office within 1-2 weeks for follow up sonogram  -bleeding precautions given    N Tuyet-MD Basilio

## 2021-05-29 NOTE — ED ADULT NURSE NOTE - CAS EDN DISCHARGE INTERVENTIONS
ASSESSMENT/PLAN:   Encounter to establish care  (primary encounter diagnosis)- exam ok except for the above mentioned rash, will get some labs   Rash and other nonspecific skin eruption- will treat with a cream a combination of steroid and anti fungal IV discontinued, cath removed intact

## 2021-05-29 NOTE — ED PROVIDER NOTE - NSFOLLOWUPINSTRUCTIONS_ED_ALL_ED_FT
You were seen today in the Emergency Department with vaginal bleeding in the setting of very early pregnancy.    Lab results and transvaginal ultrasound were concerning for an early miscarriage.  We are very sorry to inform you of this.    The next steps involve you following up with your OB/Gyn Dr. Hopkins to repeat some of the testing done today, and ensure an appropriate decrease in your lab values.  Dr. Hopkins will then inform you of whether a D&C is needed, like with your previous miscarriage.    You should call his office on Tuesday to schedule follow-up this week.    If you have any severe increase in pain, fever, uncontrollable nausea or vomiting, or inability to tolerate eating and drinking you need to immediately return to the emergency room.

## 2021-05-30 DIAGNOSIS — O20.9 HEMORRHAGE IN EARLY PREGNANCY, UNSPECIFIED: ICD-10-CM

## 2021-05-30 NOTE — ED ADULT NURSE REASSESSMENT NOTE - NS ED NURSE REASSESS COMMENT FT1
Pt resting comfortably. Denies any changes from baseline. USr as stated. Needs met. Pt reassured and made comfortable. Pt awaiting GYN consult.

## 2021-05-31 LAB
CULTURE RESULTS: SIGNIFICANT CHANGE UP
SPECIMEN SOURCE: SIGNIFICANT CHANGE UP

## 2021-06-01 ENCOUNTER — NON-APPOINTMENT (OUTPATIENT)
Age: 31
End: 2021-06-01

## 2021-06-01 RX ORDER — ENOXAPARIN SODIUM 100 MG/ML
80 INJECTION SUBCUTANEOUS
Qty: 60 | Refills: 11 | Status: DISCONTINUED | COMMUNITY
Start: 2021-05-12 | End: 2021-06-01

## 2021-06-03 ENCOUNTER — APPOINTMENT (OUTPATIENT)
Dept: OBGYN | Facility: CLINIC | Age: 31
End: 2021-06-03
Payer: COMMERCIAL

## 2021-06-03 PROCEDURE — 99072 ADDL SUPL MATRL&STAF TM PHE: CPT

## 2021-06-03 PROCEDURE — 76801 OB US < 14 WKS SINGLE FETUS: CPT

## 2021-06-07 ENCOUNTER — APPOINTMENT (OUTPATIENT)
Dept: OBGYN | Facility: CLINIC | Age: 31
End: 2021-06-07
Payer: COMMERCIAL

## 2021-06-07 PROCEDURE — 99072 ADDL SUPL MATRL&STAF TM PHE: CPT

## 2021-06-07 PROCEDURE — 36415 COLL VENOUS BLD VENIPUNCTURE: CPT

## 2021-06-09 ENCOUNTER — APPOINTMENT (OUTPATIENT)
Dept: ANTEPARTUM | Facility: CLINIC | Age: 31
End: 2021-06-09
Payer: COMMERCIAL

## 2021-06-09 PROCEDURE — 76816 OB US FOLLOW-UP PER FETUS: CPT

## 2021-06-09 PROCEDURE — 99072 ADDL SUPL MATRL&STAF TM PHE: CPT

## 2021-06-12 ENCOUNTER — APPOINTMENT (OUTPATIENT)
Dept: OBGYN | Facility: CLINIC | Age: 31
End: 2021-06-12

## 2021-07-08 NOTE — ED ADULT NURSE NOTE - TEMPLATE
Pt called back and informed him of Elidia SIERRA's recommendations in detail. See mychart encounter for provider recommendations.    Also explained that even if tests are marked as urgent they may not be scheduled for another 2-3 weeks. Unfortunately, there ware long wait times for cardiac testing at [facility].    After discussion, pt would like to proceed with echo and stress test, and declined a clinic visit.  He said he would like to try an exercise stress test.     ER precautions again advised.     To AH: please clarify which stress test should be ordered, thanks            General

## 2021-07-28 ENCOUNTER — APPOINTMENT (OUTPATIENT)
Dept: OBGYN | Facility: CLINIC | Age: 31
End: 2021-07-28
Payer: COMMERCIAL

## 2021-07-28 PROCEDURE — 76856 US EXAM PELVIC COMPLETE: CPT

## 2022-06-13 ENCOUNTER — APPOINTMENT (OUTPATIENT)
Dept: OBGYN | Facility: CLINIC | Age: 32
End: 2022-06-13

## 2022-06-13 DIAGNOSIS — Z86.79 PERSONAL HISTORY OF OTHER DISEASES OF THE CIRCULATORY SYSTEM: ICD-10-CM

## 2022-06-13 DIAGNOSIS — Q20.1 DOUBLE OUTLET RIGHT VENTRICLE: ICD-10-CM

## 2022-06-13 DIAGNOSIS — Z87.2 PERSONAL HISTORY OF DISEASES OF THE SKIN AND SUBCUTANEOUS TISSUE: ICD-10-CM

## 2022-06-13 DIAGNOSIS — Z87.42 PERSONAL HISTORY OF OTHER DISEASES OF THE FEMALE GENITAL TRACT: ICD-10-CM

## 2022-06-13 DIAGNOSIS — N91.2 AMENORRHEA, UNSPECIFIED: ICD-10-CM

## 2022-06-13 PROCEDURE — 0501F PRENATAL FLOW SHEET: CPT

## 2022-06-13 RX ORDER — ASCORBIC ACID, CHOLECALCIFEROL, .ALPHA.-TOCOPHEROL ACETATE, DL-, PYRIDOXINE, FOLIC ACID, CYANOCOBALAMIN, CALCIUM, FERROUS FUMARATE, MAGNESIUM, DOCONEXENT 85; 200; 10; 25; 1; 12; 140; 27; 45; 300 [IU]/1; [IU]/1; [IU]/1; [IU]/1; MG/1; UG/1; MG/1; MG/1; MG/1; MG/1
27-0.6-0.4-3 CAPSULE, GELATIN COATED ORAL
Qty: 30 | Refills: 8 | Status: ACTIVE | COMMUNITY
Start: 2022-06-13 | End: 1900-01-01

## 2022-06-15 LAB
APPEARANCE: CLEAR
BACTERIA: NEGATIVE
BILIRUBIN URINE: NEGATIVE
BLOOD URINE: NEGATIVE
COLOR: COLORLESS
GLUCOSE QUALITATIVE U: NEGATIVE
HYALINE CASTS: 1 /LPF
KETONES URINE: NEGATIVE
LEUKOCYTE ESTERASE URINE: NEGATIVE
MICROSCOPIC-UA: NORMAL
NITRITE URINE: NEGATIVE
PH URINE: 6.5
PROTEIN URINE: NEGATIVE
RED BLOOD CELLS URINE: 0 /HPF
SPECIFIC GRAVITY URINE: 1
SQUAMOUS EPITHELIAL CELLS: 1 /HPF
UROBILINOGEN URINE: NORMAL
WHITE BLOOD CELLS URINE: 1 /HPF

## 2022-06-16 LAB
ABO + RH PNL BLD: NORMAL
BACTERIA UR CULT: NORMAL
BASOPHILS # BLD AUTO: 0.01 K/UL
BASOPHILS NFR BLD AUTO: 0.2 %
BLD GP AB SCN SERPL QL: NORMAL
C TRACH RRNA SPEC QL NAA+PROBE: NOT DETECTED
EOSINOPHIL # BLD AUTO: 0.18 K/UL
EOSINOPHIL NFR BLD AUTO: 3 %
ESTIMATED AVERAGE GLUCOSE: 108 MG/DL
GLUCOSE SERPL-MCNC: 84 MG/DL
HAV IGM SER QL: NONREACTIVE
HBA1C MFR BLD HPLC: 5.4 %
HBV CORE IGM SER QL: NONREACTIVE
HBV SURFACE AG SER QL: NONREACTIVE
HBV SURFACE AG SER QL: NONREACTIVE
HCT VFR BLD CALC: 43.5 %
HCV AB SER QL: NONREACTIVE
HCV S/CO RATIO: 0.11 S/CO
HGB A MFR BLD: 97.6 %
HGB A2 MFR BLD: 2.4 %
HGB BLD-MCNC: 14.6 G/DL
HGB FRACT BLD-IMP: NORMAL
HIV1+2 AB SPEC QL IA.RAPID: NONREACTIVE
HPV 16 E6+E7 MRNA CVX QL NAA+PROBE: NOT DETECTED
HPV HIGH+LOW RISK DNA PNL CVX: NOT DETECTED
HPV18+45 E6+E7 MRNA CVX QL NAA+PROBE: NOT DETECTED
IMM GRANULOCYTES NFR BLD AUTO: 0.3 %
LEAD BLD-MCNC: <1 UG/DL
LYMPHOCYTES # BLD AUTO: 1.17 K/UL
LYMPHOCYTES NFR BLD AUTO: 19.3 %
MAN DIFF?: NORMAL
MCHC RBC-ENTMCNC: 31.5 PG
MCHC RBC-ENTMCNC: 33.6 GM/DL
MCV RBC AUTO: 94 FL
MEV IGG FLD QL IA: 199 AU/ML
MEV IGG+IGM SER-IMP: POSITIVE
MONOCYTES # BLD AUTO: 0.49 K/UL
MONOCYTES NFR BLD AUTO: 8.1 %
MUV AB SER-ACNC: NEGATIVE
MUV IGG SER QL IA: 8.4 AU/ML
N GONORRHOEA RRNA SPEC QL NAA+PROBE: NOT DETECTED
NEUTROPHILS # BLD AUTO: 4.19 K/UL
NEUTROPHILS NFR BLD AUTO: 69.1 %
PLATELET # BLD AUTO: 161 K/UL
PROGEST SERPL-MCNC: 15.7 NG/ML
RBC # BLD: 4.63 M/UL
RBC # FLD: 13.5 %
RUBV IGG FLD-ACNC: 4.1 INDEX
RUBV IGG SER-IMP: POSITIVE
SOURCE AMPLIFICATION: NORMAL
T PALLIDUM AB SER QL IA: NEGATIVE
WBC # FLD AUTO: 6.06 K/UL

## 2022-06-20 LAB — CYTOLOGY CVX/VAG DOC THIN PREP: NORMAL

## 2022-06-22 ENCOUNTER — APPOINTMENT (OUTPATIENT)
Dept: ANTEPARTUM | Facility: CLINIC | Age: 32
End: 2022-06-22

## 2022-06-22 ENCOUNTER — APPOINTMENT (OUTPATIENT)
Dept: OBGYN | Facility: CLINIC | Age: 32
End: 2022-06-22

## 2022-06-22 VITALS — WEIGHT: 151 LBS | SYSTOLIC BLOOD PRESSURE: 135 MMHG | BODY MASS INDEX: 25.16 KG/M2 | DIASTOLIC BLOOD PRESSURE: 80 MMHG

## 2022-06-22 PROCEDURE — ZZZZZ: CPT

## 2022-06-22 PROCEDURE — 76801 OB US < 14 WKS SINGLE FETUS: CPT

## 2022-06-24 LAB
AR GENE MUT ANL BLD/T: NORMAL
CFTR MUT TESTED BLD/T: NEGATIVE
FMR1 GENE MUT ANL BLD/T: NORMAL

## 2022-07-08 ENCOUNTER — APPOINTMENT (OUTPATIENT)
Dept: OBGYN | Facility: CLINIC | Age: 32
End: 2022-07-08

## 2022-07-08 VITALS
SYSTOLIC BLOOD PRESSURE: 136 MMHG | WEIGHT: 150 LBS | DIASTOLIC BLOOD PRESSURE: 87 MMHG | HEIGHT: 64 IN | BODY MASS INDEX: 25.61 KG/M2

## 2022-07-08 DIAGNOSIS — Z3A.11 11 WEEKS GESTATION OF PREGNANCY: ICD-10-CM

## 2022-07-08 PROCEDURE — ZZZZZ: CPT

## 2022-07-12 LAB
1ST TRIMESTER DATA: NORMAL
ADDENDUM DOC: NORMAL
AFP PNL SERPL: NORMAL
AFP SERPL-ACNC: NORMAL
CLINICAL BIOCHEMIST REVIEW: NORMAL
FREE BETA HCG 1ST TRIMESTER: NORMAL
Lab: NORMAL
NOTES NTD: NORMAL
NT: NORMAL
PAPP-A SERPL-ACNC: NORMAL
TRISOMY 18/3: NORMAL

## 2022-07-26 ENCOUNTER — NON-APPOINTMENT (OUTPATIENT)
Age: 32
End: 2022-07-26

## 2022-08-03 ENCOUNTER — APPOINTMENT (OUTPATIENT)
Dept: OBGYN | Facility: CLINIC | Age: 32
End: 2022-08-03

## 2022-08-03 PROCEDURE — 0502F SUBSEQUENT PRENATAL CARE: CPT

## 2022-08-03 RX ORDER — PROGESTERONE 200 MG/1
200 CAPSULE ORAL
Qty: 30 | Refills: 8 | Status: ACTIVE | COMMUNITY
Start: 2022-08-03 | End: 1900-01-01

## 2022-08-09 ENCOUNTER — NON-APPOINTMENT (OUTPATIENT)
Age: 32
End: 2022-08-09

## 2022-08-11 ENCOUNTER — APPOINTMENT (OUTPATIENT)
Dept: PEDIATRIC CARDIOLOGY | Facility: CLINIC | Age: 32
End: 2022-08-11

## 2022-08-11 VITALS
HEIGHT: 64.96 IN | HEART RATE: 51 BPM | OXYGEN SATURATION: 98 % | DIASTOLIC BLOOD PRESSURE: 81 MMHG | SYSTOLIC BLOOD PRESSURE: 124 MMHG | BODY MASS INDEX: 25.93 KG/M2 | WEIGHT: 155.65 LBS

## 2022-08-11 PROCEDURE — 93325 DOPPLER ECHO COLOR FLOW MAPG: CPT

## 2022-08-11 PROCEDURE — 93303 ECHO TRANSTHORACIC: CPT

## 2022-08-11 PROCEDURE — 99215 OFFICE O/P EST HI 40 MIN: CPT | Mod: 25

## 2022-08-11 PROCEDURE — 93000 ELECTROCARDIOGRAM COMPLETE: CPT

## 2022-08-11 PROCEDURE — 93320 DOPPLER ECHO COMPLETE: CPT

## 2022-08-11 RX ORDER — ENOXAPARIN SODIUM 40 MG/.4ML
40 INJECTION, SOLUTION SUBCUTANEOUS DAILY
Qty: 30 | Refills: 0 | Status: DISCONTINUED | COMMUNITY
Start: 2022-06-13 | End: 2022-08-11

## 2022-08-11 RX ORDER — ENOXAPARIN SODIUM 40 MG/.4ML
40 INJECTION, SOLUTION SUBCUTANEOUS DAILY
Qty: 30 | Refills: 2 | Status: DISCONTINUED | COMMUNITY
Start: 2022-07-26 | End: 2022-08-11

## 2022-08-16 ENCOUNTER — NON-APPOINTMENT (OUTPATIENT)
Age: 32
End: 2022-08-16

## 2022-08-17 ENCOUNTER — APPOINTMENT (OUTPATIENT)
Dept: ANTEPARTUM | Facility: CLINIC | Age: 32
End: 2022-08-17

## 2022-08-17 ENCOUNTER — APPOINTMENT (OUTPATIENT)
Dept: OBGYN | Facility: CLINIC | Age: 32
End: 2022-08-17

## 2022-08-17 VITALS — SYSTOLIC BLOOD PRESSURE: 126 MMHG | BODY MASS INDEX: 26.16 KG/M2 | WEIGHT: 157 LBS | DIASTOLIC BLOOD PRESSURE: 76 MMHG

## 2022-08-17 DIAGNOSIS — Z3A.17 17 WEEKS GESTATION OF PREGNANCY: ICD-10-CM

## 2022-08-17 PROCEDURE — 76815 OB US LIMITED FETUS(S): CPT

## 2022-08-17 PROCEDURE — 76817 TRANSVAGINAL US OBSTETRIC: CPT

## 2022-08-17 PROCEDURE — 0502F SUBSEQUENT PRENATAL CARE: CPT

## 2022-08-23 ENCOUNTER — APPOINTMENT (OUTPATIENT)
Dept: OBGYN | Facility: CLINIC | Age: 32
End: 2022-08-23

## 2022-08-23 DIAGNOSIS — R30.0 DYSURIA: ICD-10-CM

## 2022-08-23 DIAGNOSIS — Z00.00 ENCOUNTER FOR GENERAL ADULT MEDICAL EXAMINATION W/OUT ABNORMAL FINDINGS: ICD-10-CM

## 2022-08-23 PROCEDURE — ZZZZZ: CPT

## 2022-08-26 ENCOUNTER — NON-APPOINTMENT (OUTPATIENT)
Age: 32
End: 2022-08-26

## 2022-08-26 DIAGNOSIS — N39.0 URINARY TRACT INFECTION, SITE NOT SPECIFIED: ICD-10-CM

## 2022-08-29 RX ORDER — NITROFURANTOIN (MONOHYDRATE/MACROCRYSTALS) 25; 75 MG/1; MG/1
100 CAPSULE ORAL
Qty: 14 | Refills: 0 | Status: ACTIVE | COMMUNITY
Start: 2022-08-29 | End: 1900-01-01

## 2022-08-31 LAB
AFP PNL SERPL: NORMAL
APPEARANCE: ABNORMAL
BACTERIA: ABNORMAL
BILIRUBIN URINE: NEGATIVE
BLOOD URINE: ABNORMAL
CALCIUM OXALATE CRYSTALS: ABNORMAL
COLOR: NORMAL
GLUCOSE QUALITATIVE U: NEGATIVE
KETONES URINE: NORMAL
LEUKOCYTE ESTERASE URINE: ABNORMAL
MICROSCOPIC-UA: NORMAL
NITRITE URINE: NEGATIVE
PH URINE: 6
PROTEIN URINE: ABNORMAL
RED BLOOD CELLS URINE: 500 /HPF
SPECIFIC GRAVITY URINE: 1.02
SQUAMOUS EPITHELIAL CELLS: 3 /HPF
UROBILINOGEN URINE: NORMAL
WHITE BLOOD CELLS URINE: 200 /HPF

## 2022-09-01 ENCOUNTER — APPOINTMENT (OUTPATIENT)
Dept: OBGYN | Facility: CLINIC | Age: 32
End: 2022-09-01

## 2022-09-01 VITALS — WEIGHT: 163 LBS | SYSTOLIC BLOOD PRESSURE: 138 MMHG | BODY MASS INDEX: 27.16 KG/M2 | DIASTOLIC BLOOD PRESSURE: 80 MMHG

## 2022-09-01 PROCEDURE — 0502F SUBSEQUENT PRENATAL CARE: CPT

## 2022-09-07 ENCOUNTER — APPOINTMENT (OUTPATIENT)
Dept: ANTEPARTUM | Facility: CLINIC | Age: 32
End: 2022-09-07

## 2022-09-07 ENCOUNTER — APPOINTMENT (OUTPATIENT)
Dept: OBGYN | Facility: CLINIC | Age: 32
End: 2022-09-07

## 2022-09-07 VITALS — DIASTOLIC BLOOD PRESSURE: 79 MMHG | SYSTOLIC BLOOD PRESSURE: 134 MMHG | BODY MASS INDEX: 26.49 KG/M2 | WEIGHT: 159 LBS

## 2022-09-07 PROCEDURE — 76817 TRANSVAGINAL US OBSTETRIC: CPT

## 2022-09-07 PROCEDURE — 76805 OB US >/= 14 WKS SNGL FETUS: CPT | Mod: 59

## 2022-09-07 PROCEDURE — 0502F SUBSEQUENT PRENATAL CARE: CPT

## 2022-09-09 ENCOUNTER — APPOINTMENT (OUTPATIENT)
Dept: PEDIATRIC CARDIOLOGY | Facility: CLINIC | Age: 32
End: 2022-09-09
Payer: COMMERCIAL

## 2022-09-09 PROCEDURE — 93325 DOPPLER ECHO COLOR FLOW MAPG: CPT | Mod: 59

## 2022-09-09 PROCEDURE — 76825 ECHO EXAM OF FETAL HEART: CPT

## 2022-09-09 PROCEDURE — 99203 OFFICE O/P NEW LOW 30 MIN: CPT | Mod: 25

## 2022-09-09 PROCEDURE — 76827 ECHO EXAM OF FETAL HEART: CPT

## 2022-09-09 PROCEDURE — 76820 UMBILICAL ARTERY ECHO: CPT

## 2022-09-09 RX ORDER — PROGESTERONE 200 MG/1
200 CAPSULE ORAL
Qty: 90 | Refills: 0 | Status: ACTIVE | COMMUNITY
Start: 2022-09-09 | End: 1900-01-01

## 2022-09-21 ENCOUNTER — APPOINTMENT (OUTPATIENT)
Dept: OBGYN | Facility: CLINIC | Age: 32
End: 2022-09-21

## 2022-09-21 VITALS — SYSTOLIC BLOOD PRESSURE: 121 MMHG | WEIGHT: 163 LBS | BODY MASS INDEX: 27.16 KG/M2 | DIASTOLIC BLOOD PRESSURE: 76 MMHG

## 2022-09-21 PROCEDURE — 0502F SUBSEQUENT PRENATAL CARE: CPT

## 2022-10-03 ENCOUNTER — APPOINTMENT (OUTPATIENT)
Dept: ANTEPARTUM | Facility: CLINIC | Age: 32
End: 2022-10-03

## 2022-10-03 ENCOUNTER — APPOINTMENT (OUTPATIENT)
Dept: OBGYN | Facility: CLINIC | Age: 32
End: 2022-10-03

## 2022-10-03 PROCEDURE — 76817 TRANSVAGINAL US OBSTETRIC: CPT

## 2022-10-03 PROCEDURE — 76816 OB US FOLLOW-UP PER FETUS: CPT

## 2022-10-03 PROCEDURE — 36415 COLL VENOUS BLD VENIPUNCTURE: CPT

## 2022-10-03 PROCEDURE — 0502F SUBSEQUENT PRENATAL CARE: CPT

## 2022-10-04 LAB
BASOPHILS # BLD AUTO: 0.01 K/UL
BASOPHILS NFR BLD AUTO: 0.1 %
EOSINOPHIL # BLD AUTO: 0.09 K/UL
EOSINOPHIL NFR BLD AUTO: 1.1 %
GLUCOSE 1H P 50 G GLC PO SERPL-MCNC: 99 MG/DL
HCT VFR BLD CALC: 41.6 %
HGB BLD-MCNC: 14 G/DL
IMM GRANULOCYTES NFR BLD AUTO: 0.5 %
LYMPHOCYTES # BLD AUTO: 1.15 K/UL
LYMPHOCYTES NFR BLD AUTO: 14.5 %
MAN DIFF?: NORMAL
MCHC RBC-ENTMCNC: 31.7 PG
MCHC RBC-ENTMCNC: 33.7 GM/DL
MCV RBC AUTO: 94.1 FL
MONOCYTES # BLD AUTO: 0.53 K/UL
MONOCYTES NFR BLD AUTO: 6.7 %
NEUTROPHILS # BLD AUTO: 6.1 K/UL
NEUTROPHILS NFR BLD AUTO: 77.1 %
PLATELET # BLD AUTO: 167 K/UL
RBC # BLD: 4.42 M/UL
RBC # FLD: 13.1 %
WBC # FLD AUTO: 7.92 K/UL

## 2022-10-05 ENCOUNTER — APPOINTMENT (OUTPATIENT)
Dept: ANTEPARTUM | Facility: CLINIC | Age: 32
End: 2022-10-05

## 2022-10-05 ENCOUNTER — APPOINTMENT (OUTPATIENT)
Dept: OBGYN | Facility: CLINIC | Age: 32
End: 2022-10-05

## 2022-10-05 PROCEDURE — 0502F SUBSEQUENT PRENATAL CARE: CPT

## 2022-10-05 PROCEDURE — 76816 OB US FOLLOW-UP PER FETUS: CPT

## 2022-10-05 PROCEDURE — 76817 TRANSVAGINAL US OBSTETRIC: CPT

## 2022-10-19 ENCOUNTER — APPOINTMENT (OUTPATIENT)
Dept: OBGYN | Facility: CLINIC | Age: 32
End: 2022-10-19

## 2022-10-19 PROCEDURE — 0502F SUBSEQUENT PRENATAL CARE: CPT

## 2022-10-24 ENCOUNTER — OUTPATIENT (OUTPATIENT)
Dept: INPATIENT UNIT | Facility: HOSPITAL | Age: 32
LOS: 1 days | Discharge: HOME | End: 2022-10-24

## 2022-10-24 ENCOUNTER — NON-APPOINTMENT (OUTPATIENT)
Age: 32
End: 2022-10-24

## 2022-10-24 VITALS — SYSTOLIC BLOOD PRESSURE: 120 MMHG | DIASTOLIC BLOOD PRESSURE: 69 MMHG | HEART RATE: 63 BPM

## 2022-10-24 VITALS
TEMPERATURE: 98 F | RESPIRATION RATE: 16 BRPM | OXYGEN SATURATION: 99 % | DIASTOLIC BLOOD PRESSURE: 78 MMHG | HEART RATE: 72 BPM | SYSTOLIC BLOOD PRESSURE: 132 MMHG

## 2022-10-24 DIAGNOSIS — Z98.890 OTHER SPECIFIED POSTPROCEDURAL STATES: Chronic | ICD-10-CM

## 2022-10-24 DIAGNOSIS — O26.899 OTHER SPECIFIED PREGNANCY RELATED CONDITIONS, UNSPECIFIED TRIMESTER: ICD-10-CM

## 2022-10-24 DIAGNOSIS — Z3A.00 WEEKS OF GESTATION OF PREGNANCY NOT SPECIFIED: ICD-10-CM

## 2022-10-24 LAB
APPEARANCE UR: CLEAR — SIGNIFICANT CHANGE UP
BILIRUB UR-MCNC: NEGATIVE — SIGNIFICANT CHANGE UP
COLOR SPEC: COLORLESS — SIGNIFICANT CHANGE UP
DIFF PNL FLD: NEGATIVE — SIGNIFICANT CHANGE UP
GLUCOSE UR QL: NEGATIVE — SIGNIFICANT CHANGE UP
KETONES UR-MCNC: NEGATIVE — SIGNIFICANT CHANGE UP
LEUKOCYTE ESTERASE UR-ACNC: NEGATIVE — SIGNIFICANT CHANGE UP
NITRITE UR-MCNC: NEGATIVE — SIGNIFICANT CHANGE UP
PH UR: 6.5 — SIGNIFICANT CHANGE UP (ref 5–8)
PROT UR-MCNC: NEGATIVE — SIGNIFICANT CHANGE UP
SP GR SPEC: 1.01 — LOW (ref 1.01–1.05)
UROBILINOGEN FLD QL: SIGNIFICANT CHANGE UP

## 2022-10-24 PROCEDURE — 76818 FETAL BIOPHYS PROFILE W/NST: CPT | Mod: 26

## 2022-10-24 PROCEDURE — 99214 OFFICE O/P EST MOD 30 MIN: CPT | Mod: 25

## 2022-10-24 NOTE — OB RN TRIAGE NOTE - CHIEF COMPLAINT QUOTE
decreased fetal movement and pressure  followed by Dr Hopkins cl 1.9-2.3 unicornuate uterus decreased fetal movement and pressure  followed by Dr Hopkins cl 1.9-2.3 unicornuate uterus  fhr nikkid @ 1824 140's

## 2022-10-24 NOTE — OB RN TRIAGE NOTE - NSICDXPASTMEDICALHX_GEN_ALL_CORE_FT
PAST MEDICAL HISTORY:  Congenital heart disease     Dextrocardia     DVT, lower extremity "the right I think"-2005/2006    HPV in female     Uterus, unicornuate     Ventricular septal defect (VSD)

## 2022-10-24 NOTE — OB RN TRIAGE NOTE - CURRENT PREGNANCY COMPLICATIONS, OB PROFILE
Incompetent Cervix/Cervical Insufficiency/Gestational Age less than 36 Weeks unicornuate uterus, cervical length 1.9-2.3 at 24 wks/Incompetent Cervix/Cervical Insufficiency/Gestational Age less than 36 Weeks/Other

## 2022-10-24 NOTE — OB PROVIDER TRIAGE NOTE - NSHPPHYSICALEXAM_GEN_ALL_CORE
32y  at 26w4d presents to triage c/o decreased fetal movements for the past 2 days. Now reports feeling the baby moves in triage. Denies any vaginal bleeding, no ROM, no LOF, no contractions.  Prenatal care: Dr Hopkisn complicated by shortened cervix    OBH: SAB x 2  GYN: H/o HPV; Unicornuate uterus  PMH: Congenital heart disease; VSD, Dextrocardia              Lower extremity DVT  PSH: 1994 Fontan procedure           2005 Ablation  Allergies  -amoxicillin (Rash)  -penicillin (Rash)  -vancomycin (Other)

## 2022-10-24 NOTE — OB PROVIDER TRIAGE NOTE - HISTORY OF PRESENT ILLNESS
32y  at 26w4d presents to triage c/o decreased fetal movements for the past 2 days. Now reports feeling the baby moves in triage. Denies any vaginal bleeding, no ROM, no LOF, no contractions.  Prenatal care: Dr Hopkins complicated by shortened cervix on progesterone; Also h/o DVT on Lovenox.    OBH: SAB x 2  GYN: H/o HPV; Unicornuate uterus  PMH: Congenital heart disease; VSD, Dextrocardia              Lower extremity DVT  PSH: 1994 Fontan procedure           2005 Ablation  Allergies  -amoxicillin (Rash)  -penicillin (Rash)  -vancomycin (Other)

## 2022-10-24 NOTE — OB PROVIDER TRIAGE NOTE - NSOBPROVIDERNOTE_OBGYN_ALL_OB_FT
32y  at 26w4d with reassuring fetal status  Pt now feeling the baby moves  TAS: BPP  with URSULA 14.76  D/w Dr Daily  D/c home with instructions   labor precautions  Pt instructed to call OB in Am for follow up in the next 2 days  Fetal kick instructions given  Pt also instructed to increase PO hydration

## 2022-10-24 NOTE — OB PROVIDER TRIAGE NOTE - NSHPLABSRESULTS_GEN_ALL_CORE
Urinalysis Basic - ( 24 Oct 2022 20:00 )    Color: Colorless / Appearance: Clear / S.006 / pH: x  Gluc: x / Ketone: Negative  / Bili: Negative / Urobili: <2 mg/dL   Blood: x / Protein: Negative / Nitrite: Negative   Leuk Esterase: Negative / RBC: x / WBC x   Sq Epi: x / Non Sq Epi: x / Bacteria: x

## 2022-10-24 NOTE — OB RN TRIAGE NOTE - FALL HARM RISK - PATIENT NEEDS ASSISTANCE
Attending Note       The Resident's history was reviewed and patient interviewed.    The History is confirmed      Brief history:  Short of breath, cough, history of COPD and CHF, AFib.      The Resident's physical exam was reviewed and patient examined.    Physical confirmed      Brief Physical:  Bilateral lower extremity swelling right possibly mildly worse than left.  Initially appearing in no acute distress, on reassessment, respiratory rate is increased, he is coughing up white sputum      The Assessment and plan were reviewed with the resident.      I confirm the clinical impression.    I have reviewed the resident's care plan and agree with the planned approach.  Labs and x-ray her largely stable though he clinically worsens during ED evaluation and given his history will plan to diurese and observe in the hospital     Yuan Cueto,   06/01/20 [id number]     No assistance needed

## 2022-10-24 NOTE — OB PROVIDER TRIAGE NOTE - PLAN OF CARE
D/c home with instructions   labor precautions  Pt instructed to call OB in Am for follow up in the next 2 days  Fetal kick instructions given  Pt also instructed to increase PO hydration

## 2022-10-24 NOTE — OB PROVIDER TRIAGE NOTE - CURRENT PREGNANCY COMPLICATIONS, OB PROFILE
unicornuate uterus, cervical length 1.9-2.3 at 24 wks/Incompetent Cervix/Cervical Insufficiency/Gestational Age less than 36 Weeks/Other

## 2022-11-11 ENCOUNTER — APPOINTMENT (OUTPATIENT)
Dept: PEDIATRIC CARDIOLOGY | Facility: CLINIC | Age: 32
End: 2022-11-11

## 2022-11-11 PROCEDURE — 93000 ELECTROCARDIOGRAM COMPLETE: CPT

## 2022-11-11 PROCEDURE — 93303 ECHO TRANSTHORACIC: CPT

## 2022-11-11 PROCEDURE — 93325 DOPPLER ECHO COLOR FLOW MAPG: CPT

## 2022-11-11 PROCEDURE — 99215 OFFICE O/P EST HI 40 MIN: CPT | Mod: 25

## 2022-11-11 PROCEDURE — 93321 DOPPLER ECHO F-UP/LMTD STD: CPT

## 2022-11-16 ENCOUNTER — APPOINTMENT (OUTPATIENT)
Dept: ANTEPARTUM | Facility: CLINIC | Age: 32
End: 2022-11-16

## 2022-11-16 ENCOUNTER — APPOINTMENT (OUTPATIENT)
Dept: OBGYN | Facility: CLINIC | Age: 32
End: 2022-11-16

## 2022-11-16 VITALS — DIASTOLIC BLOOD PRESSURE: 79 MMHG | BODY MASS INDEX: 28.49 KG/M2 | WEIGHT: 171 LBS | SYSTOLIC BLOOD PRESSURE: 125 MMHG

## 2022-11-16 PROCEDURE — 0502F SUBSEQUENT PRENATAL CARE: CPT

## 2022-11-16 PROCEDURE — 90715 TDAP VACCINE 7 YRS/> IM: CPT

## 2022-11-16 PROCEDURE — 90471 IMMUNIZATION ADMIN: CPT

## 2022-11-30 ENCOUNTER — APPOINTMENT (OUTPATIENT)
Dept: OBGYN | Facility: CLINIC | Age: 32
End: 2022-11-30

## 2022-11-30 ENCOUNTER — APPOINTMENT (OUTPATIENT)
Dept: ANTEPARTUM | Facility: CLINIC | Age: 32
End: 2022-11-30

## 2022-11-30 VITALS
WEIGHT: 173 LBS | HEIGHT: 64 IN | DIASTOLIC BLOOD PRESSURE: 74 MMHG | BODY MASS INDEX: 29.53 KG/M2 | SYSTOLIC BLOOD PRESSURE: 113 MMHG

## 2022-11-30 DIAGNOSIS — Z33.1 PREGNANT STATE, INCIDENTAL: ICD-10-CM

## 2022-11-30 PROCEDURE — 76819 FETAL BIOPHYS PROFIL W/O NST: CPT | Mod: 59

## 2022-11-30 PROCEDURE — 76816 OB US FOLLOW-UP PER FETUS: CPT

## 2022-11-30 PROCEDURE — 0502F SUBSEQUENT PRENATAL CARE: CPT

## 2022-11-30 PROCEDURE — 76820 UMBILICAL ARTERY ECHO: CPT | Mod: 59

## 2022-12-07 ENCOUNTER — APPOINTMENT (OUTPATIENT)
Dept: OBGYN | Facility: CLINIC | Age: 32
End: 2022-12-07

## 2022-12-07 ENCOUNTER — APPOINTMENT (OUTPATIENT)
Dept: ANTEPARTUM | Facility: CLINIC | Age: 32
End: 2022-12-07
Payer: COMMERCIAL

## 2022-12-07 VITALS
WEIGHT: 173 LBS | HEIGHT: 64 IN | BODY MASS INDEX: 29.53 KG/M2 | SYSTOLIC BLOOD PRESSURE: 108 MMHG | DIASTOLIC BLOOD PRESSURE: 72 MMHG

## 2022-12-07 PROCEDURE — 76818 FETAL BIOPHYS PROFILE W/NST: CPT

## 2022-12-07 PROCEDURE — 0502F SUBSEQUENT PRENATAL CARE: CPT

## 2022-12-07 PROCEDURE — 76820 UMBILICAL ARTERY ECHO: CPT

## 2022-12-09 ENCOUNTER — TRANSCRIPTION ENCOUNTER (OUTPATIENT)
Age: 32
End: 2022-12-09

## 2022-12-09 ENCOUNTER — INPATIENT (INPATIENT)
Facility: HOSPITAL | Age: 32
LOS: 1 days | Discharge: HOME | End: 2022-12-11
Attending: OBSTETRICS & GYNECOLOGY | Admitting: OBSTETRICS & GYNECOLOGY

## 2022-12-09 ENCOUNTER — RESULT REVIEW (OUTPATIENT)
Age: 32
End: 2022-12-09

## 2022-12-09 VITALS
HEART RATE: 64 BPM | TEMPERATURE: 98 F | DIASTOLIC BLOOD PRESSURE: 76 MMHG | SYSTOLIC BLOOD PRESSURE: 136 MMHG | RESPIRATION RATE: 16 BRPM

## 2022-12-09 DIAGNOSIS — O26.899 OTHER SPECIFIED PREGNANCY RELATED CONDITIONS, UNSPECIFIED TRIMESTER: ICD-10-CM

## 2022-12-09 DIAGNOSIS — Z98.890 OTHER SPECIFIED POSTPROCEDURAL STATES: Chronic | ICD-10-CM

## 2022-12-09 DIAGNOSIS — O42.90 PREMATURE RUPTURE OF MEMBRANES, UNSPECIFIED AS TO LENGTH OF TIME BETWEEN RUPTURE AND ONSET OF LABOR, UNSPECIFIED WEEKS OF GESTATION: ICD-10-CM

## 2022-12-09 LAB
BASOPHILS # BLD AUTO: 0.02 K/UL — SIGNIFICANT CHANGE UP (ref 0–0.2)
BASOPHILS NFR BLD AUTO: 0.2 % — SIGNIFICANT CHANGE UP (ref 0–2)
EOSINOPHIL # BLD AUTO: 0.06 K/UL — SIGNIFICANT CHANGE UP (ref 0–0.5)
EOSINOPHIL NFR BLD AUTO: 0.6 % — SIGNIFICANT CHANGE UP (ref 0–6)
HCT VFR BLD CALC: 40.2 % — SIGNIFICANT CHANGE UP (ref 34.5–45)
HGB BLD-MCNC: 14.1 G/DL — SIGNIFICANT CHANGE UP (ref 11.5–15.5)
IANC: 8.26 K/UL — HIGH (ref 1.8–7.4)
IMM GRANULOCYTES NFR BLD AUTO: 0.7 % — SIGNIFICANT CHANGE UP (ref 0–0.9)
LYMPHOCYTES # BLD AUTO: 1.62 K/UL — SIGNIFICANT CHANGE UP (ref 1–3.3)
LYMPHOCYTES # BLD AUTO: 14.9 % — SIGNIFICANT CHANGE UP (ref 13–44)
MCHC RBC-ENTMCNC: 31.3 PG — SIGNIFICANT CHANGE UP (ref 27–34)
MCHC RBC-ENTMCNC: 35.1 GM/DL — SIGNIFICANT CHANGE UP (ref 32–36)
MCV RBC AUTO: 89.1 FL — SIGNIFICANT CHANGE UP (ref 80–100)
MONOCYTES # BLD AUTO: 0.8 K/UL — SIGNIFICANT CHANGE UP (ref 0–0.9)
MONOCYTES NFR BLD AUTO: 7.4 % — SIGNIFICANT CHANGE UP (ref 2–14)
NEUTROPHILS # BLD AUTO: 8.26 K/UL — HIGH (ref 1.8–7.4)
NEUTROPHILS NFR BLD AUTO: 76.2 % — SIGNIFICANT CHANGE UP (ref 43–77)
NRBC # BLD: 0 /100 WBCS — SIGNIFICANT CHANGE UP (ref 0–0)
NRBC # FLD: 0 K/UL — SIGNIFICANT CHANGE UP (ref 0–0)
PLATELET # BLD AUTO: 189 K/UL — SIGNIFICANT CHANGE UP (ref 150–400)
RBC # BLD: 4.51 M/UL — SIGNIFICANT CHANGE UP (ref 3.8–5.2)
RBC # FLD: 12.5 % — SIGNIFICANT CHANGE UP (ref 10.3–14.5)
WBC # BLD: 10.84 K/UL — HIGH (ref 3.8–10.5)
WBC # FLD AUTO: 10.84 K/UL — HIGH (ref 3.8–10.5)

## 2022-12-09 PROCEDURE — 88307 TISSUE EXAM BY PATHOLOGIST: CPT | Mod: 26

## 2022-12-09 PROCEDURE — 59400 OBSTETRICAL CARE: CPT

## 2022-12-09 RX ORDER — IBUPROFEN 200 MG
600 TABLET ORAL EVERY 6 HOURS
Refills: 0 | Status: COMPLETED | OUTPATIENT
Start: 2022-12-09 | End: 2023-11-07

## 2022-12-09 RX ORDER — HYDROCORTISONE 1 %
1 OINTMENT (GRAM) TOPICAL EVERY 6 HOURS
Refills: 0 | Status: DISCONTINUED | OUTPATIENT
Start: 2022-12-09 | End: 2022-12-11

## 2022-12-09 RX ORDER — IBUPROFEN 200 MG
1 TABLET ORAL
Qty: 0 | Refills: 0 | DISCHARGE
Start: 2022-12-09

## 2022-12-09 RX ORDER — KETOROLAC TROMETHAMINE 30 MG/ML
30 SYRINGE (ML) INJECTION ONCE
Refills: 0 | Status: DISCONTINUED | OUTPATIENT
Start: 2022-12-09 | End: 2022-12-11

## 2022-12-09 RX ORDER — SIMETHICONE 80 MG/1
80 TABLET, CHEWABLE ORAL EVERY 4 HOURS
Refills: 0 | Status: DISCONTINUED | OUTPATIENT
Start: 2022-12-09 | End: 2022-12-11

## 2022-12-09 RX ORDER — AER TRAVELER 0.5 G/1
1 SOLUTION RECTAL; TOPICAL EVERY 4 HOURS
Refills: 0 | Status: DISCONTINUED | OUTPATIENT
Start: 2022-12-09 | End: 2022-12-11

## 2022-12-09 RX ORDER — CHLORHEXIDINE GLUCONATE 213 G/1000ML
1 SOLUTION TOPICAL ONCE
Refills: 0 | Status: DISCONTINUED | OUTPATIENT
Start: 2022-12-09 | End: 2022-12-09

## 2022-12-09 RX ORDER — ACETAMINOPHEN 500 MG
3 TABLET ORAL
Qty: 0 | Refills: 0 | DISCHARGE
Start: 2022-12-09

## 2022-12-09 RX ORDER — PRAMOXINE HYDROCHLORIDE 150 MG/15G
1 AEROSOL, FOAM RECTAL EVERY 4 HOURS
Refills: 0 | Status: DISCONTINUED | OUTPATIENT
Start: 2022-12-09 | End: 2022-12-11

## 2022-12-09 RX ORDER — ACETAMINOPHEN 500 MG
975 TABLET ORAL
Refills: 0 | Status: DISCONTINUED | OUTPATIENT
Start: 2022-12-09 | End: 2022-12-11

## 2022-12-09 RX ORDER — OXYTOCIN 10 UNIT/ML
333.33 VIAL (ML) INJECTION
Qty: 20 | Refills: 0 | Status: DISCONTINUED | OUTPATIENT
Start: 2022-12-09 | End: 2022-12-10

## 2022-12-09 RX ORDER — MAGNESIUM HYDROXIDE 400 MG/1
30 TABLET, CHEWABLE ORAL
Refills: 0 | Status: DISCONTINUED | OUTPATIENT
Start: 2022-12-09 | End: 2022-12-11

## 2022-12-09 RX ORDER — OXYCODONE HYDROCHLORIDE 5 MG/1
5 TABLET ORAL ONCE
Refills: 0 | Status: DISCONTINUED | OUTPATIENT
Start: 2022-12-09 | End: 2022-12-11

## 2022-12-09 RX ORDER — SODIUM CHLORIDE 9 MG/ML
3 INJECTION INTRAMUSCULAR; INTRAVENOUS; SUBCUTANEOUS EVERY 8 HOURS
Refills: 0 | Status: DISCONTINUED | OUTPATIENT
Start: 2022-12-09 | End: 2022-12-11

## 2022-12-09 RX ORDER — LANOLIN
1 OINTMENT (GRAM) TOPICAL EVERY 6 HOURS
Refills: 0 | Status: DISCONTINUED | OUTPATIENT
Start: 2022-12-09 | End: 2022-12-11

## 2022-12-09 RX ORDER — SODIUM CHLORIDE 9 MG/ML
1000 INJECTION, SOLUTION INTRAVENOUS
Refills: 0 | Status: DISCONTINUED | OUTPATIENT
Start: 2022-12-09 | End: 2022-12-09

## 2022-12-09 RX ORDER — DIPHENHYDRAMINE HCL 50 MG
25 CAPSULE ORAL EVERY 6 HOURS
Refills: 0 | Status: DISCONTINUED | OUTPATIENT
Start: 2022-12-09 | End: 2022-12-11

## 2022-12-09 RX ORDER — BENZOCAINE 10 %
1 GEL (GRAM) MUCOUS MEMBRANE EVERY 6 HOURS
Refills: 0 | Status: DISCONTINUED | OUTPATIENT
Start: 2022-12-09 | End: 2022-12-11

## 2022-12-09 RX ORDER — OXYTOCIN 10 UNIT/ML
333.33 VIAL (ML) INJECTION
Qty: 20 | Refills: 0 | Status: DISCONTINUED | OUTPATIENT
Start: 2022-12-09 | End: 2022-12-09

## 2022-12-09 RX ORDER — OXYCODONE HYDROCHLORIDE 5 MG/1
5 TABLET ORAL
Refills: 0 | Status: DISCONTINUED | OUTPATIENT
Start: 2022-12-09 | End: 2022-12-11

## 2022-12-09 RX ORDER — TETANUS TOXOID, REDUCED DIPHTHERIA TOXOID AND ACELLULAR PERTUSSIS VACCINE, ADSORBED 5; 2.5; 8; 8; 2.5 [IU]/.5ML; [IU]/.5ML; UG/.5ML; UG/.5ML; UG/.5ML
0.5 SUSPENSION INTRAMUSCULAR ONCE
Refills: 0 | Status: DISCONTINUED | OUTPATIENT
Start: 2022-12-09 | End: 2022-12-11

## 2022-12-09 RX ORDER — DIBUCAINE 1 %
1 OINTMENT (GRAM) RECTAL EVERY 6 HOURS
Refills: 0 | Status: DISCONTINUED | OUTPATIENT
Start: 2022-12-09 | End: 2022-12-11

## 2022-12-09 NOTE — DISCHARGE NOTE OB - NS MD DC FALL RISK RISK
For information on Fall & Injury Prevention, visit: [email or web address] OR  [email or web address] OR  https://www.cdc.gov/steadi/patient.html

## 2022-12-09 NOTE — OB PROVIDER DELIVERY SUMMARY - NSMATERNALFETALCONCERNS_OBGYN_ALL_OB_FT
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 Status post placement of right bidirectional Jordan shunt.Status post lateral tunnel Fontan. Fetal echo done on 22 due to maternal CHD. Normal fetal echocardiogram. A nonurgent  cardiology evaluation is recommended in 2-3  months after birth, sooner if there is a clinical concern.Chelle Washington RN.

## 2022-12-09 NOTE — OB PROVIDER TRIAGE NOTE - CURRENT PREGNANCY COMPLICATIONS, OB PROFILE
congenital heart disease/Incompetent Cervix/Cervical Insufficiency/Maternal Medical Condition/Gestational Age less than 36 Weeks/Maternal Unknown GBS

## 2022-12-09 NOTE — OB RN PATIENT PROFILE - FALL HARM RISK - UNIVERSAL INTERVENTIONS
Bed in lowest position, wheels locked, appropriate side rails in place/Call bell, personal items and telephone in reach/Instruct patient to call for assistance before getting out of bed or chair/Non-slip footwear when patient is out of bed/[place] to call system/Physically safe environment - no spills, clutter or unnecessary equipment/Purposeful Proactive Rounding/Room/bathroom lighting operational, light cord in reach

## 2022-12-09 NOTE — OB PROVIDER TRIAGE NOTE - NSRISKFACTORSDETA_OBGYN_ALL_OB
Other serious chronic Disease/Referral for High Risk Care/Cardiac Disease: Functional Defect/Cardiac Disease: Structural Defect

## 2022-12-09 NOTE — DISCHARGE NOTE OB - MEDICATION SUMMARY - MEDICATIONS TO TAKE
I will START or STAY ON the medications listed below when I get home from the hospital:    acetaminophen 325 mg oral tablet  -- 3 tab(s) by mouth every 6 hours  -- Indication: For Vaginal delivery    ibuprofen 600 mg oral tablet  -- 1 tab(s) by mouth every 6 hours  -- Indication: For Vaginal delivery    Lovenox  -- injectable 2 times a day  -- Indication: For History of maternal cardiac surgery    Prena1 oral capsule  -- 1 cap(s) by mouth once a day  -- Indication: For Vaginal delivery    Prenatal Multivitamins with Folic Acid 1 mg oral tablet  -- 1 tab(s) by mouth once a day  -- Indication: For Vaginal delivery

## 2022-12-09 NOTE — OB RN DELIVERY SUMMARY - NSSELHIDDEN_OBGYN_ALL_OB_FT
[NS_DeliveryAttending1_OBGYN_ALL_OB_FT:[id number]],[NS_DeliveryAttending2_OBGYN_ALL_OB_FT:[id number]],[NS_DeliveryAssist1_OBGYN_ALL_OB_FT:[id number]],[NS_DeliveryRN_OBGYN_ALL_OB_FT:[id number]]

## 2022-12-09 NOTE — OB PROVIDER DELIVERY SUMMARY - NSPROVIDERDELIVERYNOTE_OBGYN_ALL_OB_FT
Spontaneous vaginal delivery of liveborn infant from MARCI position. Head, shoulders, and body delivered easily. Infant was suctioned. No mec. Delayed cord clamping and infant was passed to mother. Cord clamped and cut. Placenta delivered intact with a 3-vessel cord. Fundal massage was given and uterine fundus was found to be firm. Vaginal exam revealed an intact cervix, vaginal walls and sulci. Patient had a small superficial vaginal laceration that was repaired with one interrupted stitch using 2.0 chromic suture. Excellent hemostasis was noted. Patient was stable and went to recovery. Count was correct x 2.    Nicky Brown PGY3

## 2022-12-09 NOTE — DISCHARGE NOTE OB - MEDICATION SUMMARY - MEDICATIONS TO STOP TAKING
I will STOP taking the medications listed below when I get home from the hospital:    progesterone  -- vaginal once a day (at bedtime)

## 2022-12-09 NOTE — DISCHARGE NOTE OB - PLAN OF CARE
- continue routine pain mgt.  - continue perineal care   - pelvic rest for 6 wk  - Patient to follow up in 6 wk  - Please notify MD if you experience persistent and overwhelming emotions inhibiting daily activities or infant care, persistent headache, excessive vaginal bleeding, foul smelling vaginal discharge, Urinary urgency/frequency/burning, or localized lower extremity swelling/warmth/redness. - Continue 70mg SQ lovenox twice daily  - follow up with Cardiologist

## 2022-12-09 NOTE — OB PROVIDER H&P - PROBLEM SELECTOR PLAN 1
Dr Daily made aware, resident aware, NICU called to DTR2, Dr White and Dr Ríos at bedside. pt moved to OR for delivery  admit for PPROM and PTL @ 33.1 wks  expectant management  unknown GBS  PRBC on hold 2/2 taking lovenox last dose 11am

## 2022-12-09 NOTE — DISCHARGE NOTE OB - CARE PROVIDER_API CALL
Catarino Hopkins)  MaternalFetal Medicine; Obstetrics and Gynecology  [address] [place] [address]  Phone: [phone]  Fax: [phone]  Follow Up Time:

## 2022-12-09 NOTE — DISCHARGE NOTE OB - PATIENT PORTAL LINK FT
You can access the FollowMyHealth Patient Portal offered by [facility] by registering at the following website: [email or web address]. By joining [facility]’s FollowMyHealth portal, you will also be able to view your health information using other applications (apps) compatible with our system.

## 2022-12-09 NOTE — OB PROVIDER H&P - NS_PELVICEXAM_OBGYN_ALL_OB
Yes Perilesional Excision Additional Text (Leave Blank If You Do Not Want): The margin was drawn around the clinically apparent lesion. Incisions were then made along these lines to the appropriate tissue plane and the lesion was extirpated.

## 2022-12-09 NOTE — OB PROVIDER TRIAGE NOTE - HISTORY OF PRESENT ILLNESS
33 yo  @ 33.1 wks c/o PPROM @ 1940 clear fluid, with contractions starting an hour later 10/10 pain q 5 minutes feels rectal pressure. 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 Fetal echo done on 22 due to maternal CHD. Normal fetal echocardiogram. A nonurgent  cardiology evaluation is recommended. hx of short cervix, unicornate uterus. fetal echo normal  GBS: unknown    meds: progesterone supp, lovenox  all: PCN amoxicillin Vancomycin  PMH: cardiac hx- cardiologist Dr Contreras  PSH: s/p 2 cardiac surgeries  gyn hx:   ob hx:

## 2022-12-09 NOTE — DISCHARGE NOTE OB - NS OB DC TDAP REASON NOT RECEIVED
Contraindicated Modified Advancement Flap Text: The defect edges were debeveled with a #15 scalpel blade.  Given the location of the defect, shape of the defect and the proximity to free margins a modified advancement flap was deemed most appropriate.  Using a sterile surgical marker, an appropriate advancement flap was drawn incorporating the defect and placing the expected incisions within the relaxed skin tension lines where possible.    The area thus outlined was incised deep to adipose tissue with a #15 scalpel blade.  The skin margins were undermined to an appropriate distance in all directions utilizing iris scissors.

## 2022-12-09 NOTE — OB NEONATOLOGY/PEDIATRICIAN DELIVERY SUMMARY - NSPEDSNEONOTESA_OBGYN_ALL_OB_FT
Called  to attend  33 week delivery due to PPROM at 33 weeks gestation . Baby is  product of a 33.1 week gestation born to a G 1 P 0   32 year old female   Maternal labs include Blood Type  A+ , HIV neg , RPR NR  , Hep B[- ], GBS  unknown, rest is unremarkable. 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 Status post placement of right bidirectional Jordan shunt. Status post lateral tunnel Fontan. Cardiac surgery x2, spinal embolism and cardiac ablation.  H/O Infertility, this pregnancy naturally conceived.  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   ROM at  1945, approximately  3 hrs.  Resuscitation included: ___________ .  Apgars were: _______ . EOS score _______. Admit to __________ .  Temperature prior to transfer ______ C. Called  to attend  33 week delivery due to PPROM at 33 weeks gestation . Baby is  product of a 33.1 week gestation born to a G 1 P 0   32 year old female   Maternal labs include Blood Type  A+ , HIV neg , RPR NR  , Hep B[- ], GBS  unknown, rest is unremarkable. 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 Status post placement of right bidirectional Jordan shunt. Status post lateral tunnel Fontan. Cardiac surgery x2, spinal embolism and cardiac ablation.  H/O Infertility, this pregnancy naturally conceived.  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   ROM at  1945, approximately  3 hrs.  Resuscitation included: WDSS, well perfused, strong cry.  Apgars were: 9&9 . Admit to NICU for prematurity

## 2022-12-09 NOTE — OB RN PATIENT PROFILE - 'COMMUNITY AGENCIES/SUPPORT GROUPS, OB PROFILE
acetaminophen 325 mg oral tablet: 2 tab(s) orally every 6 hours, As needed, Temp greater or equal to 38C (100.4F), Mild Pain (1 - 3)  amLODIPine 5 mg oral tablet: 1 tab(s) orally once a day  Aspirin Enteric Coated 81 mg oral delayed release tablet: 1 tab(s) orally once a day  cefpodoxime 200 mg oral tablet: 1 tab(s) orally every 12 hours x 5 days   escitalopram 10 mg oral tablet: 1 tab(s) orally once a day  hydrALAZINE 10 mg oral tablet: 1 tab(s) orally 4 times a day, As needed, Systolic blood pressure &gt;180  lactobacillus acidophilus oral tablet: 2 tab(s) orally once a day  magnesium hydroxide 8% oral suspension: 30 milliliter(s) orally once a day, As needed, Constipation  melatonin 3 mg oral tablet: 1 tab(s) orally once a day (at bedtime), As needed, Insomnia  omeprazole 20 mg oral delayed release capsule: 1 cap(s) orally once a day  simvastatin 40 mg oral tablet: 1 tab(s) orally once a day (at bedtime)  sodium chloride 0.65% nasal spray: nasal 3 times a day, As Needed  Vitamin D3 1000 intl units (25 mcg) oral capsule: 1 cap(s) orally once a day   N/A

## 2022-12-09 NOTE — OB PROVIDER H&P - ASSESSMENT
33 yo  @ 33.1 wks c/o PPROM @ 1940 clear fluid, with contractions starting an hour later 10/10 pain q 5 minutes feels rectal pressure. 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 Fetal echo done on 22 due to maternal CHD. Normal fetal echocardiogram. A nonurgent  cardiology evaluation is recommended. hx of short cervix, unicornate uterus. fetal echo normal  GBS: unknown    meds: progesterone supp, lovenox  all: PCN amoxicillin Vancomycin  PMH: cardiac hx- cardiologist Dr Contreras  PSH: s/p 2 cardiac surgeries  gyn hx:   ob hx:     Dr Daily made aware, resident aware, NICU called to DTR2, Dr White and Dr Ríos at bedside. pt moved to OR for delivery  admit for PPROM and PTL @ 33.1 wks  expectant management  unknown GBS  PRBC on hold  taking lovenox last dose 11am

## 2022-12-09 NOTE — OB PROVIDER DELIVERY SUMMARY - NSSELHIDDEN_OBGYN_ALL_OB_FT
[NS_DeliveryAttending1_OBGYN_ALL_OB_FT:[id number]],[NS_DeliveryAttending2_OBGYN_ALL_OB_FT:[email or web address]],[NS_DeliveryAssist1_OBGYN_ALL_OB_FT:[id number]]

## 2022-12-09 NOTE — OB PROVIDER TRIAGE NOTE - NSHPPHYSICALEXAM_GEN_ALL_CORE
abd soft gravid NT  CV RRR  LS clear bilaterally  TAS: vertex  SVE: 10/100/0  + ruptured  FHT: moderate variability, + decelerations  toco: q 3 minutes  Vital Signs Last 24 Hrs  T(C): 36.9 (09 Dec 2022 21:03), Max: 36.9 (09 Dec 2022 21:03)  T(F): 98.4 (09 Dec 2022 21:03), Max: 98.4 (09 Dec 2022 21:03)  HR: 64 (09 Dec 2022 21:29) (64 - 64)  BP: 136/76 (09 Dec 2022 21:29) (136/76 - 136/76)  BP(mean): --  RR: 16 (09 Dec 2022 21:03) (16 - 16)  SpO2: --

## 2022-12-09 NOTE — OB RN TRIAGE NOTE - FALL HARM RISK - UNIVERSAL INTERVENTIONS
Bed in lowest position, wheels locked, appropriate side rails in place/Call bell, personal items and telephone in reach/Instruct patient to call for assistance before getting out of bed or chair/Non-slip footwear when patient is out of bed/[place] to call system/Physically safe environment - no spills, clutter or unnecessary equipment/Purposeful Proactive Rounding/Room/bathroom lighting operational, light cord in reach

## 2022-12-09 NOTE — OB RN TRIAGE NOTE - NSMATERNALFETALCONCERNS_OBGYN_ALL_OB_FT
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 Fetal echo done on 22 due to maternal CHD. Normal fetal echocardiogram. A nonurgent  cardiology evaluation is recommended in 2-3  months after birth, sooner if there is a clinical concern.Chelle Washington RN.

## 2022-12-09 NOTE — DISCHARGE NOTE OB - CARE PLAN
Assessment and plan of treatment:	- continue routine pain mgt.  - continue perineal care   - pelvic rest for 6 wk  - Patient to follow up in 6 wk  - Please notify MD if you experience persistent and overwhelming emotions inhibiting daily activities or infant care, persistent headache, excessive vaginal bleeding, foul smelling vaginal discharge, Urinary urgency/frequency/burning, or localized lower extremity swelling/warmth/redness.   1 Principal Discharge DX:	Vaginal delivery  Assessment and plan of treatment:	- continue routine pain mgt.  - continue perineal care   - pelvic rest for 6 wk  - Patient to follow up in 6 wk  - Please notify MD if you experience persistent and overwhelming emotions inhibiting daily activities or infant care, persistent headache, excessive vaginal bleeding, foul smelling vaginal discharge, Urinary urgency/frequency/burning, or localized lower extremity swelling/warmth/redness.  Secondary Diagnosis:	History of maternal cardiac surgery   Principal Discharge DX:	Vaginal delivery  Assessment and plan of treatment:	- continue routine pain mgt.  - continue perineal care   - pelvic rest for 6 wk  - Patient to follow up in 6 wk  - Please notify MD if you experience persistent and overwhelming emotions inhibiting daily activities or infant care, persistent headache, excessive vaginal bleeding, foul smelling vaginal discharge, Urinary urgency/frequency/burning, or localized lower extremity swelling/warmth/redness.  Secondary Diagnosis:	History of maternal cardiac surgery  Assessment and plan of treatment:	- Continue 70mg SQ lovenox twice daily  - follow up with Cardiologist

## 2022-12-09 NOTE — OB RN TRIAGE NOTE - CURRENT PREGNANCY COMPLICATIONS, OB PROFILE
Maternal Medical Condition congenital heart disease/Incompetent Cervix/Cervical Insufficiency/Maternal Medical Condition/Gestational Age less than 36 Weeks

## 2022-12-10 LAB
BLD GP AB SCN SERPL QL: NEGATIVE — SIGNIFICANT CHANGE UP
COVID-19 SPIKE DOMAIN AB INTERP: POSITIVE
COVID-19 SPIKE DOMAIN ANTIBODY RESULT: >250 U/ML — HIGH
RH IG SCN BLD-IMP: POSITIVE — SIGNIFICANT CHANGE UP
SARS-COV-2 IGG+IGM SERPL QL IA: >250 U/ML — HIGH
SARS-COV-2 IGG+IGM SERPL QL IA: POSITIVE
SARS-COV-2 RNA SPEC QL NAA+PROBE: SIGNIFICANT CHANGE UP
T PALLIDUM AB TITR SER: NEGATIVE — SIGNIFICANT CHANGE UP

## 2022-12-10 RX ORDER — ENOXAPARIN SODIUM 100 MG/ML
70 INJECTION SUBCUTANEOUS EVERY 12 HOURS
Refills: 0 | Status: DISCONTINUED | OUTPATIENT
Start: 2022-12-10 | End: 2022-12-11

## 2022-12-10 RX ORDER — IBUPROFEN 200 MG
600 TABLET ORAL EVERY 6 HOURS
Refills: 0 | Status: DISCONTINUED | OUTPATIENT
Start: 2022-12-10 | End: 2022-12-11

## 2022-12-10 RX ADMIN — SODIUM CHLORIDE 3 MILLILITER(S): 9 INJECTION INTRAMUSCULAR; INTRAVENOUS; SUBCUTANEOUS at 14:20

## 2022-12-10 RX ADMIN — Medication 1000 MILLIUNIT(S)/MIN: at 00:20

## 2022-12-10 RX ADMIN — ENOXAPARIN SODIUM 70 MILLIGRAM(S): 100 INJECTION SUBCUTANEOUS at 06:58

## 2022-12-10 RX ADMIN — ENOXAPARIN SODIUM 70 MILLIGRAM(S): 100 INJECTION SUBCUTANEOUS at 18:50

## 2022-12-10 RX ADMIN — SODIUM CHLORIDE 3 MILLILITER(S): 9 INJECTION INTRAMUSCULAR; INTRAVENOUS; SUBCUTANEOUS at 07:14

## 2022-12-10 NOTE — PROGRESS NOTE ADULT - SUBJECTIVE AND OBJECTIVE BOX
Day  1  Vaginal Delivery  Attempted to round on patient many times today, she was not in her room, went to nicu to see her and she wasnt there either, went back to room for 5th time and pt was in shower.    She denies pain, denies heavy bleeding   T(C): 36.7 (12-10-22 @ 18:01), Max: 37 (22 @ 23:00)  HR: 76 (12-10-22 @ 18:01) (64 - 82)  BP: 110/58 (12-10-22 @ 18:01) (110/58 - 136/76)  RR: 18 (12-10-22 @ 18:01) (16 - 18)  SpO2: 100% (12-10-22 @ 18:01) (96% - 100%)  Wt(kg): --  Vital signs stable        Assessment and Plan  Postpartum day #1 s/p precipitous  - stable  h/o spinal thrombus   Continue current pain medication  Encourage  Ambulation  Encourage regular diet  DVT ppx: SCDs only when not ambulating  She will be discharged on Day 2 according to the normal criteria.  awaiting Dr. Hopkins recommendation for prophylaxis   LISA Marcelo MD

## 2022-12-11 VITALS
OXYGEN SATURATION: 96 % | TEMPERATURE: 98 F | DIASTOLIC BLOOD PRESSURE: 72 MMHG | HEART RATE: 71 BPM | RESPIRATION RATE: 16 BRPM | SYSTOLIC BLOOD PRESSURE: 118 MMHG

## 2022-12-11 RX ADMIN — ENOXAPARIN SODIUM 70 MILLIGRAM(S): 100 INJECTION SUBCUTANEOUS at 17:10

## 2022-12-11 RX ADMIN — ENOXAPARIN SODIUM 70 MILLIGRAM(S): 100 INJECTION SUBCUTANEOUS at 05:51

## 2022-12-11 NOTE — PROGRESS NOTE ADULT - ASSESSMENT
A/P: 31yo PPD #2 s/p s/p pre-term  at 33w1d .  Patient is stable and doing well post-partum.       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 Standing Ibuprofen, Acetaminophen. Oxycodone available PRN for breakthrough pain.  - Increase ambulation, SCDs when not ambulating  - Continue regular diet    Amyeo Afroz Jereen, PGY-2

## 2022-12-11 NOTE — PROGRESS NOTE ADULT - ATTENDING COMMENTS
Patient seen and examined at bedside  Agree with the above note and plan  Patient states that she is feeling well overall.    Confirmed plan for 70mg Lovenox BID for 6 weeks as per Dr. Hopkins [facility]    Patient cleared for Discharge

## 2022-12-11 NOTE — PROGRESS NOTE ADULT - SUBJECTIVE AND OBJECTIVE BOX
OB Progress Note:  PPD #2    S: 33yo  now PPD#2 . Patient feels well. Pain is well controlled. She is tolerating a regular diet and passing flatus. She is voiding spontaneously, and ambulating without difficulty. Denies CP/SOB. Denies lightheadedness/dizziness. Denies N/V.    O:  Vitals:  Vital Signs Last 24 Hrs  T(C): 36.4 (11 Dec 2022 05:45), Max: 36.9 (10 Dec 2022 08:11)  T(F): 97.6 (11 Dec 2022 05:45), Max: 98.4 (10 Dec 2022 08:11)  HR: 64 (11 Dec 2022 05:45) (64 - 73)  BP: 109/64 (11 Dec 2022 05:45) (109/64 - 126/67)  BP(mean): --  RR: 17 (11 Dec 2022 05:45) (16 - 18)  SpO2: 98% (11 Dec 2022 05:45) (96% - 100%)    Parameters below as of 11 Dec 2022 05:45  Patient On (Oxygen Delivery Method): room air      Physical Exam:  General: NAD  Abdomen: Soft, non-tender, non-distended, fundus firm  Vaginal: Lochia wnl  Extremities: No erythema/edema          MEDICATIONS  (STANDING):  acetaminophen     Tablet .. 975 milliGRAM(s) Oral <User Schedule>  diphtheria/tetanus/pertussis (acellular) Vaccine (Adacel) 0.5 milliLiter(s) IntraMuscular once  enoxaparin Injectable 70 milliGRAM(s) SubCutaneous every 12 hours  ibuprofen  Tablet. 600 milliGRAM(s) Oral every 6 hours  ketorolac   Injectable 30 milliGRAM(s) IV Push once  prenatal multivitamin 1 Tablet(s) Oral daily  sodium chloride 0.9% lock flush 3 milliLiter(s) IV Push every 8 hours      Labs:  Blood type: A Positive  Rubella IgG: RPR: Negative                          14.1   10.84<H> >-----------< 189    (  @ 21:50 )             40.2

## 2022-12-14 ENCOUNTER — APPOINTMENT (OUTPATIENT)
Dept: OBGYN | Facility: CLINIC | Age: 32
End: 2022-12-14

## 2022-12-14 ENCOUNTER — APPOINTMENT (OUTPATIENT)
Dept: ANTEPARTUM | Facility: CLINIC | Age: 32
End: 2022-12-14

## 2022-12-17 DIAGNOSIS — Q28.9: ICD-10-CM

## 2022-12-19 ENCOUNTER — APPOINTMENT (OUTPATIENT)
Dept: PEDIATRIC CARDIOLOGY | Facility: CLINIC | Age: 32
End: 2022-12-19

## 2022-12-21 ENCOUNTER — APPOINTMENT (OUTPATIENT)
Dept: OBGYN | Facility: CLINIC | Age: 32
End: 2022-12-21

## 2022-12-21 ENCOUNTER — APPOINTMENT (OUTPATIENT)
Dept: ANTEPARTUM | Facility: CLINIC | Age: 32
End: 2022-12-21

## 2022-12-21 LAB — SURGICAL PATHOLOGY STUDY: SIGNIFICANT CHANGE UP

## 2022-12-28 ENCOUNTER — APPOINTMENT (OUTPATIENT)
Dept: OBGYN | Facility: CLINIC | Age: 32
End: 2022-12-28

## 2022-12-28 ENCOUNTER — APPOINTMENT (OUTPATIENT)
Dept: ANTEPARTUM | Facility: CLINIC | Age: 32
End: 2022-12-28

## 2023-01-04 ENCOUNTER — APPOINTMENT (OUTPATIENT)
Dept: ANTEPARTUM | Facility: CLINIC | Age: 33
End: 2023-01-04

## 2023-01-04 ENCOUNTER — APPOINTMENT (OUTPATIENT)
Dept: OBGYN | Facility: CLINIC | Age: 33
End: 2023-01-04

## 2023-01-11 ENCOUNTER — APPOINTMENT (OUTPATIENT)
Dept: ANTEPARTUM | Facility: CLINIC | Age: 33
End: 2023-01-11

## 2023-01-11 ENCOUNTER — APPOINTMENT (OUTPATIENT)
Dept: OBGYN | Facility: CLINIC | Age: 33
End: 2023-01-11

## 2023-01-13 ENCOUNTER — APPOINTMENT (OUTPATIENT)
Dept: PEDIATRIC CARDIOLOGY | Facility: CLINIC | Age: 33
End: 2023-01-13
Payer: COMMERCIAL

## 2023-01-13 VITALS
BODY MASS INDEX: 25.33 KG/M2 | HEART RATE: 60 BPM | WEIGHT: 161.38 LBS | DIASTOLIC BLOOD PRESSURE: 74 MMHG | HEIGHT: 66.93 IN | OXYGEN SATURATION: 97 % | SYSTOLIC BLOOD PRESSURE: 127 MMHG

## 2023-01-13 DIAGNOSIS — I47.1 SUPRAVENTRICULAR TACHYCARDIA: ICD-10-CM

## 2023-01-13 PROCEDURE — 93320 DOPPLER ECHO COMPLETE: CPT

## 2023-01-13 PROCEDURE — 99213 OFFICE O/P EST LOW 20 MIN: CPT | Mod: 25

## 2023-01-13 PROCEDURE — 93000 ELECTROCARDIOGRAM COMPLETE: CPT

## 2023-01-13 PROCEDURE — 93303 ECHO TRANSTHORACIC: CPT

## 2023-01-13 PROCEDURE — 93325 DOPPLER ECHO COLOR FLOW MAPG: CPT

## 2023-01-13 RX ORDER — ENOXAPARIN SODIUM 80 MG/.8ML
80 INJECTION, SOLUTION SUBCUTANEOUS
Qty: 180 | Refills: 2 | Status: DISCONTINUED | COMMUNITY
Start: 1900-01-01 | End: 2023-01-13

## 2023-01-13 RX ORDER — APIXABAN 5 MG/1
5 TABLET, FILM COATED ORAL
Qty: 180 | Refills: 3 | Status: ACTIVE | COMMUNITY
Start: 2020-02-11 | End: 1900-01-01

## 2023-01-19 ENCOUNTER — APPOINTMENT (OUTPATIENT)
Dept: OBGYN | Facility: CLINIC | Age: 33
End: 2023-01-19

## 2023-06-12 ENCOUNTER — APPOINTMENT (OUTPATIENT)
Dept: OBGYN | Facility: CLINIC | Age: 33
End: 2023-06-12

## 2023-11-20 ENCOUNTER — APPOINTMENT (OUTPATIENT)
Dept: OBGYN | Facility: CLINIC | Age: 33
End: 2023-11-20
Payer: COMMERCIAL

## 2023-11-20 VITALS — DIASTOLIC BLOOD PRESSURE: 81 MMHG | BODY MASS INDEX: 24.8 KG/M2 | SYSTOLIC BLOOD PRESSURE: 135 MMHG | WEIGHT: 158 LBS

## 2023-11-20 DIAGNOSIS — Z01.419 ENCOUNTER FOR GYNECOLOGICAL EXAMINATION (GENERAL) (ROUTINE) W/OUT ABNORMAL FINDINGS: ICD-10-CM

## 2023-11-20 PROCEDURE — 99395 PREV VISIT EST AGE 18-39: CPT

## 2023-11-21 LAB
C TRACH RRNA SPEC QL NAA+PROBE: NOT DETECTED
HPV HIGH+LOW RISK DNA PNL CVX: NOT DETECTED
N GONORRHOEA RRNA SPEC QL NAA+PROBE: NOT DETECTED
SOURCE AMPLIFICATION: NORMAL

## 2023-11-22 ENCOUNTER — APPOINTMENT (OUTPATIENT)
Dept: OBGYN | Facility: CLINIC | Age: 33
End: 2023-11-22
Payer: COMMERCIAL

## 2023-11-22 PROCEDURE — 36415 COLL VENOUS BLD VENIPUNCTURE: CPT

## 2023-11-25 LAB
CYTOLOGY CVX/VAG DOC THIN PREP: NORMAL
HPV 16 E6+E7 MRNA CVX QL NAA+PROBE: NOT DETECTED
HPV18+45 E6+E7 MRNA CVX QL NAA+PROBE: NOT DETECTED

## 2023-11-26 ENCOUNTER — EMERGENCY (EMERGENCY)
Facility: HOSPITAL | Age: 33
LOS: 1 days | Discharge: HOME | End: 2023-11-26
Attending: STUDENT IN AN ORGANIZED HEALTH CARE EDUCATION/TRAINING PROGRAM | Admitting: EMERGENCY MEDICINE
Payer: COMMERCIAL

## 2023-11-26 VITALS
RESPIRATION RATE: 16 BRPM | DIASTOLIC BLOOD PRESSURE: 76 MMHG | HEART RATE: 73 BPM | TEMPERATURE: 98 F | SYSTOLIC BLOOD PRESSURE: 127 MMHG | OXYGEN SATURATION: 100 %

## 2023-11-26 DIAGNOSIS — Z98.890 OTHER SPECIFIED POSTPROCEDURAL STATES: Chronic | ICD-10-CM

## 2023-11-26 LAB
ALBUMIN SERPL ELPH-MCNC: 4.4 G/DL — SIGNIFICANT CHANGE UP (ref 3.3–5)
ALBUMIN SERPL ELPH-MCNC: 4.4 G/DL — SIGNIFICANT CHANGE UP (ref 3.3–5)
ALP SERPL-CCNC: 48 U/L — SIGNIFICANT CHANGE UP (ref 40–120)
ALP SERPL-CCNC: 48 U/L — SIGNIFICANT CHANGE UP (ref 40–120)
ALT FLD-CCNC: 11 U/L — SIGNIFICANT CHANGE UP (ref 4–33)
ALT FLD-CCNC: 11 U/L — SIGNIFICANT CHANGE UP (ref 4–33)
ANION GAP SERPL CALC-SCNC: 12 MMOL/L — SIGNIFICANT CHANGE UP (ref 7–14)
ANION GAP SERPL CALC-SCNC: 12 MMOL/L — SIGNIFICANT CHANGE UP (ref 7–14)
APTT BLD: 28.9 SEC — SIGNIFICANT CHANGE UP (ref 24.5–35.6)
APTT BLD: 28.9 SEC — SIGNIFICANT CHANGE UP (ref 24.5–35.6)
AST SERPL-CCNC: 14 U/L — SIGNIFICANT CHANGE UP (ref 4–32)
AST SERPL-CCNC: 14 U/L — SIGNIFICANT CHANGE UP (ref 4–32)
BASOPHILS # BLD AUTO: 0.02 K/UL — SIGNIFICANT CHANGE UP (ref 0–0.2)
BASOPHILS # BLD AUTO: 0.02 K/UL — SIGNIFICANT CHANGE UP (ref 0–0.2)
BASOPHILS NFR BLD AUTO: 0.3 % — SIGNIFICANT CHANGE UP (ref 0–2)
BASOPHILS NFR BLD AUTO: 0.3 % — SIGNIFICANT CHANGE UP (ref 0–2)
BILIRUB SERPL-MCNC: 0.7 MG/DL — SIGNIFICANT CHANGE UP (ref 0.2–1.2)
BILIRUB SERPL-MCNC: 0.7 MG/DL — SIGNIFICANT CHANGE UP (ref 0.2–1.2)
BLD GP AB SCN SERPL QL: NEGATIVE — SIGNIFICANT CHANGE UP
BLD GP AB SCN SERPL QL: NEGATIVE — SIGNIFICANT CHANGE UP
BUN SERPL-MCNC: 18 MG/DL — SIGNIFICANT CHANGE UP (ref 7–23)
BUN SERPL-MCNC: 18 MG/DL — SIGNIFICANT CHANGE UP (ref 7–23)
CALCIUM SERPL-MCNC: 9.8 MG/DL — SIGNIFICANT CHANGE UP (ref 8.4–10.5)
CALCIUM SERPL-MCNC: 9.8 MG/DL — SIGNIFICANT CHANGE UP (ref 8.4–10.5)
CHLORIDE SERPL-SCNC: 103 MMOL/L — SIGNIFICANT CHANGE UP (ref 98–107)
CHLORIDE SERPL-SCNC: 103 MMOL/L — SIGNIFICANT CHANGE UP (ref 98–107)
CO2 SERPL-SCNC: 23 MMOL/L — SIGNIFICANT CHANGE UP (ref 22–31)
CO2 SERPL-SCNC: 23 MMOL/L — SIGNIFICANT CHANGE UP (ref 22–31)
CREAT SERPL-MCNC: 0.88 MG/DL — SIGNIFICANT CHANGE UP (ref 0.5–1.3)
CREAT SERPL-MCNC: 0.88 MG/DL — SIGNIFICANT CHANGE UP (ref 0.5–1.3)
EGFR: 88 ML/MIN/1.73M2 — SIGNIFICANT CHANGE UP
EGFR: 88 ML/MIN/1.73M2 — SIGNIFICANT CHANGE UP
EOSINOPHIL # BLD AUTO: 0.25 K/UL — SIGNIFICANT CHANGE UP (ref 0–0.5)
EOSINOPHIL # BLD AUTO: 0.25 K/UL — SIGNIFICANT CHANGE UP (ref 0–0.5)
EOSINOPHIL NFR BLD AUTO: 3.1 % — SIGNIFICANT CHANGE UP (ref 0–6)
EOSINOPHIL NFR BLD AUTO: 3.1 % — SIGNIFICANT CHANGE UP (ref 0–6)
GLUCOSE SERPL-MCNC: 199 MG/DL — HIGH (ref 70–99)
GLUCOSE SERPL-MCNC: 199 MG/DL — HIGH (ref 70–99)
HCG SERPL-ACNC: 4624 MIU/ML — SIGNIFICANT CHANGE UP
HCG SERPL-ACNC: 4624 MIU/ML — SIGNIFICANT CHANGE UP
HCT VFR BLD CALC: 41 % — SIGNIFICANT CHANGE UP (ref 34.5–45)
HCT VFR BLD CALC: 41 % — SIGNIFICANT CHANGE UP (ref 34.5–45)
HGB BLD-MCNC: 14.4 G/DL — SIGNIFICANT CHANGE UP (ref 11.5–15.5)
HGB BLD-MCNC: 14.4 G/DL — SIGNIFICANT CHANGE UP (ref 11.5–15.5)
IANC: 5.41 K/UL — SIGNIFICANT CHANGE UP (ref 1.8–7.4)
IANC: 5.41 K/UL — SIGNIFICANT CHANGE UP (ref 1.8–7.4)
IMM GRANULOCYTES NFR BLD AUTO: 0.3 % — SIGNIFICANT CHANGE UP (ref 0–0.9)
IMM GRANULOCYTES NFR BLD AUTO: 0.3 % — SIGNIFICANT CHANGE UP (ref 0–0.9)
INR BLD: 1.05 RATIO — SIGNIFICANT CHANGE UP (ref 0.85–1.18)
INR BLD: 1.05 RATIO — SIGNIFICANT CHANGE UP (ref 0.85–1.18)
LYMPHOCYTES # BLD AUTO: 1.77 K/UL — SIGNIFICANT CHANGE UP (ref 1–3.3)
LYMPHOCYTES # BLD AUTO: 1.77 K/UL — SIGNIFICANT CHANGE UP (ref 1–3.3)
LYMPHOCYTES # BLD AUTO: 22.3 % — SIGNIFICANT CHANGE UP (ref 13–44)
LYMPHOCYTES # BLD AUTO: 22.3 % — SIGNIFICANT CHANGE UP (ref 13–44)
MCHC RBC-ENTMCNC: 31.1 PG — SIGNIFICANT CHANGE UP (ref 27–34)
MCHC RBC-ENTMCNC: 31.1 PG — SIGNIFICANT CHANGE UP (ref 27–34)
MCHC RBC-ENTMCNC: 35.1 GM/DL — SIGNIFICANT CHANGE UP (ref 32–36)
MCHC RBC-ENTMCNC: 35.1 GM/DL — SIGNIFICANT CHANGE UP (ref 32–36)
MCV RBC AUTO: 88.6 FL — SIGNIFICANT CHANGE UP (ref 80–100)
MCV RBC AUTO: 88.6 FL — SIGNIFICANT CHANGE UP (ref 80–100)
MONOCYTES # BLD AUTO: 0.48 K/UL — SIGNIFICANT CHANGE UP (ref 0–0.9)
MONOCYTES # BLD AUTO: 0.48 K/UL — SIGNIFICANT CHANGE UP (ref 0–0.9)
MONOCYTES NFR BLD AUTO: 6 % — SIGNIFICANT CHANGE UP (ref 2–14)
MONOCYTES NFR BLD AUTO: 6 % — SIGNIFICANT CHANGE UP (ref 2–14)
NEUTROPHILS # BLD AUTO: 5.41 K/UL — SIGNIFICANT CHANGE UP (ref 1.8–7.4)
NEUTROPHILS # BLD AUTO: 5.41 K/UL — SIGNIFICANT CHANGE UP (ref 1.8–7.4)
NEUTROPHILS NFR BLD AUTO: 68 % — SIGNIFICANT CHANGE UP (ref 43–77)
NEUTROPHILS NFR BLD AUTO: 68 % — SIGNIFICANT CHANGE UP (ref 43–77)
NRBC # BLD: 0 /100 WBCS — SIGNIFICANT CHANGE UP (ref 0–0)
NRBC # BLD: 0 /100 WBCS — SIGNIFICANT CHANGE UP (ref 0–0)
NRBC # FLD: 0 K/UL — SIGNIFICANT CHANGE UP (ref 0–0)
NRBC # FLD: 0 K/UL — SIGNIFICANT CHANGE UP (ref 0–0)
PLATELET # BLD AUTO: 204 K/UL — SIGNIFICANT CHANGE UP (ref 150–400)
PLATELET # BLD AUTO: 204 K/UL — SIGNIFICANT CHANGE UP (ref 150–400)
POTASSIUM SERPL-MCNC: 4.3 MMOL/L — SIGNIFICANT CHANGE UP (ref 3.5–5.3)
POTASSIUM SERPL-MCNC: 4.3 MMOL/L — SIGNIFICANT CHANGE UP (ref 3.5–5.3)
POTASSIUM SERPL-SCNC: 4.3 MMOL/L — SIGNIFICANT CHANGE UP (ref 3.5–5.3)
POTASSIUM SERPL-SCNC: 4.3 MMOL/L — SIGNIFICANT CHANGE UP (ref 3.5–5.3)
PROT SERPL-MCNC: 7.4 G/DL — SIGNIFICANT CHANGE UP (ref 6–8.3)
PROT SERPL-MCNC: 7.4 G/DL — SIGNIFICANT CHANGE UP (ref 6–8.3)
PROTHROM AB SERPL-ACNC: 11.9 SEC — SIGNIFICANT CHANGE UP (ref 9.5–13)
PROTHROM AB SERPL-ACNC: 11.9 SEC — SIGNIFICANT CHANGE UP (ref 9.5–13)
RBC # BLD: 4.63 M/UL — SIGNIFICANT CHANGE UP (ref 3.8–5.2)
RBC # BLD: 4.63 M/UL — SIGNIFICANT CHANGE UP (ref 3.8–5.2)
RBC # FLD: 12.5 % — SIGNIFICANT CHANGE UP (ref 10.3–14.5)
RBC # FLD: 12.5 % — SIGNIFICANT CHANGE UP (ref 10.3–14.5)
RH IG SCN BLD-IMP: POSITIVE — SIGNIFICANT CHANGE UP
RH IG SCN BLD-IMP: POSITIVE — SIGNIFICANT CHANGE UP
SODIUM SERPL-SCNC: 138 MMOL/L — SIGNIFICANT CHANGE UP (ref 135–145)
SODIUM SERPL-SCNC: 138 MMOL/L — SIGNIFICANT CHANGE UP (ref 135–145)
WBC # BLD: 7.95 K/UL — SIGNIFICANT CHANGE UP (ref 3.8–10.5)
WBC # BLD: 7.95 K/UL — SIGNIFICANT CHANGE UP (ref 3.8–10.5)
WBC # FLD AUTO: 7.95 K/UL — SIGNIFICANT CHANGE UP (ref 3.8–10.5)
WBC # FLD AUTO: 7.95 K/UL — SIGNIFICANT CHANGE UP (ref 3.8–10.5)

## 2023-11-26 PROCEDURE — 93010 ELECTROCARDIOGRAM REPORT: CPT | Mod: 76

## 2023-11-26 PROCEDURE — 76817 TRANSVAGINAL US OBSTETRIC: CPT | Mod: 26

## 2023-11-26 PROCEDURE — 99285 EMERGENCY DEPT VISIT HI MDM: CPT

## 2023-11-26 RX ORDER — SODIUM CHLORIDE 9 MG/ML
1000 INJECTION INTRAMUSCULAR; INTRAVENOUS; SUBCUTANEOUS ONCE
Refills: 0 | Status: COMPLETED | OUTPATIENT
Start: 2023-11-26 | End: 2023-11-26

## 2023-11-26 RX ADMIN — SODIUM CHLORIDE 1000 MILLILITER(S): 9 INJECTION INTRAMUSCULAR; INTRAVENOUS; SUBCUTANEOUS at 22:36

## 2023-11-26 NOTE — ED PROVIDER NOTE - CARE PROVIDER_API CALL
Catarino Hopkins  Maternal/Fetal Medicine  [address], [address]  [place] [address]  Phone: [phone]  Fax: [phone]  Established Patient  Follow Up Time:    Catarino Hopkins  Maternal/Fetal Medicine  [address], [address]  [place] [address]  Phone: [phone]  Fax: [phone]  Established Patient  Follow Up Time:    Catarino Hopkins  Maternal/Fetal Medicine  [address], [address]  [place] [address]  Phone: [phone]  Fax: [phone]  Established Patient  Follow Up Time:

## 2023-11-26 NOTE — ED PROVIDER NOTE - ATTENDING CONTRIBUTION TO CARE
33-year-old female history of prior DVTs, dextrocardia, LMP 9/24 presents with 1 week of vaginal spotting, followed by increased vaginal bleeding 2 hours prior to arrival with large clots. PE: Well appearing, RRR, CTA BL lungs, abd soft NTND. A/P Labs, imaging, medicate, reassess

## 2023-11-26 NOTE — ED PROVIDER NOTE - PATIENT PORTAL LINK FT
You can access the FollowMyHealth Patient Portal offered by [facility] by registering at the following website: [email or web address]. By joining [facility]’s FollowMyHealth portal, you will also be able to view your health information using other applications (apps) compatible with our system. You can access the FollowMyHealth Patient Portal offered by [facility] by registering at the following website: [email or web address]. By joining [facility]’s FollowMyHealth portal, you will also be able to view your health information using other applications (apps) compatible with our system. You can access the FollowMyHealth Patient Portal offered by [facility] by registering at the following website: [email or web address] By joining [facility]’s FollowMyHealth portal, you will also be able to view your health information using other applications (apps) compatible with our system.

## 2023-11-26 NOTE — ED PROVIDER NOTE - NSFOLLOWUPCLINICS_GEN_ALL_ED_FT
[facility]  OB/GYN & Surg  [address] [place] [address]  Phone: [phone]  Fax:      [facility] - [facility]  OB/GYN & Surg  [phone] [place] [address]  Phone: [phone]  Fax:      [facility] - Ambulatory Care Clinic  OB/GYN & Surg  [address] [place] [address]  Phone: [phone]  Fax:

## 2023-11-26 NOTE — ED ADULT TRIAGE NOTE - CHIEF COMPLAINT QUOTE
Patient c/o vaginal bleeding. Approx 8 weeks pregnant, reports heavy vag bleeding with clots for 1 hours. Endorses nausea, no vomiting. No abdominal pain/cramping.

## 2023-11-26 NOTE — ED ADULT NURSE NOTE - OBJECTIVE STATEMENT
34 y/o F presents to ED intake A&Ox4 c/o vaginal bleeding. pt 9 weeks pregnant; "I'm having a miscarriage." pt endorsing passing large clots with large amount of vaginal bleeding with associated light handedness. pt pallor appearance. pt has had 2 miscarriage in the past. respirations even and unlabored. denies c/p, weakness, pain, c/p, SOB, n/v/d. 20G placed to RAC, labs drawn and sent. after IV placement, pt endorsing feeling "like I'm going to pass out." pt advised to lay down. MD made aware at bedside assessing pt. EKG being performed at this time. awaiting US. safety maintained, side rails up. family at bedside

## 2023-11-26 NOTE — ED PROVIDER NOTE - PROGRESS NOTE DETAILS
Patient with vagal episode in ED. Now at baseline. Denies CP, SOB, abd pain or increased bleeding. Will give fluids, obtain EKG, reassess.  -Fer Rosario PGY-2 Ean IRBY: Pt signed out to me.  Pt reassessed and is feeling better.  She has been evaluated by OBGYN and is cleared for discharged.  Pt is stable for discharge and outpatient follow-up.

## 2023-11-26 NOTE — ED PROVIDER NOTE - CARE PROVIDERS DIRECT ADDRESSES
[email or web address]

## 2023-11-26 NOTE — ED PROVIDER NOTE - PHYSICAL EXAMINATION
GEN: NAD. A&Ox3. Non-toxic appearing.  HEENT: normocephalic, atraumatic, EOMI, PERRL, no scleral icterus, no conjuntival pallor, moist MM  CARDIAC: RRR, S1, S2, no murmur. Radial pulses present and symmetric b/l  PULM: CTA B/L no wheeze, rhonchi, rales.   ABD: soft NT, ND, no rebound no guarding  : Pelvic chaperoned by Dr. Amaral. Moderate blood in vaginal vault with clots. OS poorly visualized, no adnexal ttp.   MSK: Moving all extremities, no edema. 5/5 strength and full ROM in all extremities.     NEURO: gait normal, no focal neurological deficits, CN 2-12 grossly intact  SKIN: warm, dry, no rash.

## 2023-11-26 NOTE — ED PROVIDER NOTE - CLINICAL SUMMARY MEDICAL DECISION MAKING FREE TEXT BOX
33-year-old female history of prior DVTs, dextrocardia, LMP  presents with 1 week of vaginal spotting, followed by increased vaginal bleeding 2 hours prior to arrival with large clots.  Patient began feeling lightheaded, denies loss of consciousness.  States bleeding slowly improved since initial onset.  Denies fevers, chest pain, shortness of breath, nausea, vomiting, abdominal pain, urinary symptoms.  Afebrile, not tachycardic, not hypotensive, lungs clear to auscultation, abdomen soft nontender, pelvic with moderate blood in vaginal vault with clots, OS not visualized.  Concern for spontaneous .  Will obtain labs, transvaginal ultrasound, reassess.

## 2023-11-26 NOTE — ED PROVIDER NOTE - NSFOLLOWUPINSTRUCTIONS_ED_ALL_ED_FT
Advance activity as tolerated.  Continue all previously prescribed medications as directed unless otherwise instructed.  Practice pelvic rest -- no sexual intercourse, no foreign bodies, no tampons in vagina.  Follow up with your primary care physician and OB/GYN (referral list provided or call [phone] to make an appointment with the OB/GYN clinic)  in 48-72 hours- bring copies of your results.  Return to the ER for worsening or persistent symptoms, including but not limited to worsening/persistent pain, heavy vaginal bleeding requiring more than or equal to 2 pads in 1 hour, lightheadedness, passing out, chest pain, shortness of breath, and/or ANY NEW OR CONCERNING SYMPTOMS. If you have issues obtaining follow up, please call: [phone] to obtain a doctor or specialist who takes your insurance in your area.  You may call [phone] to make an appointment with the internal medicine clinic. Advance activity as tolerated.  Continue all previously prescribed medications as directed unless otherwise instructed.  Practice pelvic rest -- no sexual intercourse, no foreign bodies, no tampons in vagina.  Follow up with your primary care physician and OB/GYN (referral list provided or call [phone] to make an appointment with the OB/GYN clinic)  in 48-72 hours- bring copies of your results.  Return to the ER for worsening or persistent symptoms, including but not limited to worsening/persistent pain, heavy vaginal bleeding requiring more than or equal to 2 pads in 1 hour, lightheadedness, passing out, chest pain, shortness of breath, and/or ANY NEW OR CONCERNING SYMPTOMS. If you have issues obtaining follow up, please call: [phone] to obtain a doctor or specialist who takes your insurance in your area.  You may call [phone] to make an appointment with the internal medicine clinic. Advance activity as tolerated.  Continue all previously prescribed medications as directed unless otherwise instructed.  Practice pelvic rest -- no sexual intercourse, no foreign bodies, no tampons in vagina.  Follow up with your primary care physician and OB/GYN (referral list provided or call [phone] to make an appointment with the OB/GYN clinic)  in 48-72 hours- bring copies of your results.  Return to the ER for worsening or persistent symptoms, including but not limited to worsening/persistent pain, heavy vaginal bleeding requiring more than or equal to 2 pads in 1 hour, lightheadedness, passing out, chest pain, shortness of breath, and/or ANY NEW OR CONCERNING SYMPTOMS. If you have issues obtaining follow up, please call: [phone] to obtain a doctor or specialist who takes your insurance in your area.  You may call [phone] to make an appointment with the internal medicine clinic. Advance activity as tolerated.  Continue all previously prescribed medications as directed unless otherwise instructed.  Practice pelvic rest -- no sexual intercourse, no foreign bodies, no tampons in vagina.  Follow up with Dr Hopkins in 1 week - bring copies of your results.      Return to the ER for worsening or persistent symptoms, including but not limited to worsening/persistent pain, heavy vaginal bleeding requiring more than or equal to 2 pads in 1 hour, lightheadedness, passing out, chest pain, shortness of breath, and/or ANY NEW OR CONCERNING SYMPTOMS. If you have issues obtaining follow up, please call: [phone] to obtain a doctor or specialist who takes your insurance in your area.  You may call [phone] to make an appointment with the internal medicine clinic. Advance activity as tolerated.  Continue all previously prescribed medications as directed unless otherwise instructed.  Practice pelvic rest -- no sexual intercourse, no foreign bodies, no tampons in vagina.  Follow up with Dr Hopkins in 1 week - bring copies of your results.      Return to the ER for worsening or persistent symptoms, including but not limited to worsening/persistent pain, heavy vaginal bleeding requiring more than or equal to 2 pads in 1 hour, lightheadedness, passing out, chest pain, shortness of breath, and/or ANY NEW OR CONCERNING SYMPTOMS. If you have issues obtaining follow up, please call: [phone] to obtain a doctor or specialist who takes your insurance in your area.  You may call [phone] to make an appointment with the internal medicine clinic. Advance activity as tolerated.  Continue all previously prescribed medications as directed unless otherwise instructed.  Practice pelvic rest -- no sexual intercourse, no foreign bodies, no tampons in vagina.  Follow up with Dr Hopkins in 1 week - bring copies of your results.      Return to the ER for worsening or persistent symptoms, including but not limited to worsening/persistent pain, heavy vaginal bleeding requiring more than or equal to 2 pads in 1 hour, lightheadedness, passing out, chest pain, shortness of breath, and/or ANY NEW OR CONCERNING SYMPTOMS. If you have issues obtaining follow up, please call: [phone] to obtain a doctor or specialist who takes your insurance in your area.  You may call [phone] to make an appointment with the internal medicine clinic.

## 2023-11-27 VITALS
TEMPERATURE: 99 F | HEART RATE: 74 BPM | SYSTOLIC BLOOD PRESSURE: 101 MMHG | RESPIRATION RATE: 18 BRPM | OXYGEN SATURATION: 100 % | DIASTOLIC BLOOD PRESSURE: 64 MMHG

## 2023-11-27 NOTE — CONSULT NOTE ADULT - ASSESSMENT
34 yo  with LMP  9w1d by dates presents to ED for vaginal bleeding and pelvic cramping. Patient with no confirmed IUP in office and TVUS today showing no intra/extra uterine pregnancy with avascular material at the cervical os. No POC or vaginal bleeding seen on PE. Patient clinically stable and will follow up today with Dr. Hopkins for management of PUL.   -[id number](); most likely SAB but patient will follow up with private GYN for future bHCG draw.  -TVUS showing 1.4cm avascular material in the endometrium.  -VSS, H/H  no vaginal bleeding on exam  -BT: A+ no rhogam needed  -return precautions given concerning vaginal bleeding and pelvic pain.  -All questions answered    [place] PGY2  dw Dr. Toledo

## 2023-11-27 NOTE — CONSULT NOTE ADULT - SUBJECTIVE AND OBJECTIVE BOX
NEMESIO ARMSTRONG  33y  Female [id number]    HPI:  32 yo  with LMP  9w1d by dates presents to ED for vaginal bleeding and pelvic cramping. Patient has known positive pregnancy levels and had followed with Dr. Hopkins in the office with recent [id number] No IUP was visualized in the office. Patient states that early in the evening she had cramping pain and had a gush of vaginal bleeding at home with possible passage of tissue. She came to the ED to evaluate the pregnancy's viability with improvement in vaginal bleeding when she came to the ED. Denies fevers, abdominal pain, vaginal discharge, or any symptoms of anemia.        Name of GYN Physician: Dr. 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; follows with cardiology yearly with recent normal echo per patient  PSH: 2 Open heart surgery at 3 months and 4 years of age; s/p Fontan procedure at year 4  Meds: Denies  Allx: Amoxicillin, penicillin, Vancomycin (rash)      Vital Signs Last 24 Hrs  T(C): 37.1 (2023 02:27), Max: 37.1 (2023 02:27)  T(F): 98.7 (2023 02:27), Max: 98.7 (2023 02:27)  HR: 74 (2023 02:27) (63 - 74)  BP: 101/64 (2023 02:27) (101/64 - 127/76)  BP(mean): --  RR: 18 (2023 02:27) (16 - 18)  SpO2: 100% (2023 02:27) (100% - 100%)    Parameters below as of 2023 22:37  Patient On (Oxygen Delivery Method): room air        Physical Exam:   General: sitting comfortably in bed, NAD   Lungs: breathing comfortably on RA  Abd: Soft, non-tender, non-distended.     :  Minimal bleeding on pad.    External labia wnl.  Bimanual exam with no cervical motion tenderness, uterus wnl, adnexa non palpable b/l.   Cervix fingertip dilated  Speculum Exam: No active bleeding from os. Old blood at cervical os and in vaginal canal. Physiologic discharge.        LABS:                              14.4   7.95  )-----------( 204      ( 2023 22:26 )             41.0         138  |  103  |  18  ----------------------------<  199<H>  4.3   |  23  |  0.88    Ca    9.8      2023 22:26    TPro  7.4  /  Alb  4.4  /  TBili  0.7  /  DBili  x   /  AST  14  /  ALT  11  /  AlkPhos  48      I&O's Detail    PT/INR - ( 2023 22:26 )   PT: 11.9 sec;   INR: 1.05 ratio         PTT - ( 2023 22:26 )  PTT:28.9 sec  Urinalysis Basic - ( 2023 22:26 )    Color: x / Appearance: x / SG: x / pH: x  Gluc: 199 mg/dL / Ketone: x  / Bili: x / Urobili: x   Blood: x / Protein: x / Nitrite: x   Leuk Esterase: x / RBC: x / WBC x   Sq Epi: x / Non Sq Epi: x / Bacteria: x        RADIOLOGY & ADDITIONAL STUDIES:    INTERPRETATION:  CLINICAL INFORMATION: Heavy vaginal bleeding and nausea    LMP: 2023    Estimated Gestational Age by LMP: 9 weeks 0 days    COMPARISON: Pelvic ultrasound 2021    Endovaginal pelvic sonogram only. Color and Spectral Doppler was   performed.    FINDINGS:  Uterus: 9.4 x 4.0 x 4.5 cm. No intrauterine pregnancy. Endocervical canal   is distended with complex, avascular material, likely blood products.  Endometrium: Endometrial echo complex is distended up to 1.4 cm with   complex avascular material, likely blood products. No intrauterine   gestational sac identified.    Right ovary: 3.0 cm x 2.0 cm x 1.5 cm. Within normal limits. Normal   arterial and venous waveforms.  Left ovary: 4.5 cm x 2.0 cm x 2.3 cm. 1.7 cm corpus luteum cyst.. Normal   arterial and venous waveforms.    Fluid: None.    IMPRESSION:    1. Hemorrhagic material distends the endometrial and endocervical canal   without identifiable IUP. Differential includes miscarriage in progress,   sonographically occult ectopic pregnancy, less likely normal IUP below   the resolution of ultrasound. Serial hCG follow-up advised, and if the   patient is hemodynamically stable 1 week follow-up ultrasound recommended.    --- End of Report ---

## 2023-11-27 NOTE — ED ADULT NURSE REASSESSMENT NOTE - NS ED NURSE REASSESS COMMENT FT1
Pt with no acute changes at this time. Pt A&ox4, ambulatory, on room air. VSS. Pt respirations even and unlabored, chest rise and fall equal b/l. Pt denies chest pain, HA, SOB, dizziness, N/V/D, fever/chills. Stretcher in lowest position, call bell within reach, pt safety maintained.

## 2023-11-29 ENCOUNTER — ASOB RESULT (OUTPATIENT)
Age: 33
End: 2023-11-29

## 2023-11-29 ENCOUNTER — APPOINTMENT (OUTPATIENT)
Dept: ANTEPARTUM | Facility: CLINIC | Age: 33
End: 2023-11-29
Payer: COMMERCIAL

## 2023-11-29 ENCOUNTER — APPOINTMENT (OUTPATIENT)
Dept: OBGYN | Facility: CLINIC | Age: 33
End: 2023-11-29
Payer: COMMERCIAL

## 2023-11-29 VITALS — SYSTOLIC BLOOD PRESSURE: 111 MMHG | WEIGHT: 156 LBS | BODY MASS INDEX: 24.49 KG/M2 | DIASTOLIC BLOOD PRESSURE: 72 MMHG

## 2023-11-29 DIAGNOSIS — O03.4 INCOMPLETE SPONTANEOUS ABORTION W/OUT COMPLICATION: ICD-10-CM

## 2023-11-29 DIAGNOSIS — O02.1 MISSED ABORTION: ICD-10-CM

## 2023-11-29 PROCEDURE — 99213 OFFICE O/P EST LOW 20 MIN: CPT

## 2023-11-29 PROCEDURE — 76817 TRANSVAGINAL US OBSTETRIC: CPT

## 2023-11-29 PROCEDURE — 76815 OB US LIMITED FETUS(S): CPT | Mod: 59

## 2023-11-29 PROCEDURE — S0190: CPT

## 2023-11-29 PROCEDURE — 36415 COLL VENOUS BLD VENIPUNCTURE: CPT

## 2023-11-29 RX ORDER — MIFEPRISTONE 200 MG/1
200 TABLET ORAL
Refills: 0 | Status: COMPLETED | OUTPATIENT
Start: 2023-11-29

## 2023-11-29 RX ORDER — MISOPROSTOL 200 UG/1
200 TABLET ORAL
Qty: 8 | Refills: 0 | Status: ACTIVE | COMMUNITY
Start: 2023-11-29 | End: 1900-01-01

## 2023-11-29 RX ADMIN — MIFEPRISTONE 1 MG: 200 TABLET ORAL at 00:00

## 2023-11-30 LAB
HCG SERPL-MCNC: 389 MIU/ML
HCG SERPL-MCNC: 7082 MIU/ML
HCG SERPL-MCNC: 7344 MIU/ML

## 2023-12-18 ENCOUNTER — APPOINTMENT (OUTPATIENT)
Dept: ANTEPARTUM | Facility: CLINIC | Age: 33
End: 2023-12-18

## 2023-12-18 ENCOUNTER — APPOINTMENT (OUTPATIENT)
Dept: OBGYN | Facility: CLINIC | Age: 33
End: 2023-12-18
Payer: COMMERCIAL

## 2023-12-18 VITALS — SYSTOLIC BLOOD PRESSURE: 125 MMHG | WEIGHT: 156 LBS | BODY MASS INDEX: 24.49 KG/M2 | DIASTOLIC BLOOD PRESSURE: 79 MMHG

## 2023-12-18 DIAGNOSIS — O03.9 COMPLETE OR UNSPECIFIED SPONTANEOUS ABORTION W/OUT COMPLICATION: ICD-10-CM

## 2023-12-18 PROCEDURE — 36415 COLL VENOUS BLD VENIPUNCTURE: CPT

## 2023-12-18 PROCEDURE — 99213 OFFICE O/P EST LOW 20 MIN: CPT

## 2023-12-19 LAB — HCG SERPL-MCNC: 4 MIU/ML

## 2024-03-12 ENCOUNTER — RX RENEWAL (OUTPATIENT)
Age: 34
End: 2024-03-12

## 2024-03-12 RX ORDER — ASCORBIC ACID, CHOLECALCIFEROL, .ALPHA.-TOCOPHEROL ACETATE, DL-, PYRIDOXINE, FOLIC ACID, CYANOCOBALAMIN, CALCIUM, FERROUS FUMARATE, MAGNESIUM, DOCONEXENT 85; 200; 10; 25; 1; 12; 140; 27; 45; 300 [IU]/1; [IU]/1; [IU]/1; [IU]/1; MG/1; UG/1; MG/1; MG/1; MG/1; MG/1
27-0.6-0.4-3 CAPSULE, GELATIN COATED ORAL
Qty: 90 | Refills: 3 | Status: ACTIVE | COMMUNITY
Start: 2022-09-14 | End: 1900-01-01

## 2024-04-30 ENCOUNTER — APPOINTMENT (OUTPATIENT)
Dept: OBGYN | Facility: CLINIC | Age: 34
End: 2024-04-30
Payer: COMMERCIAL

## 2024-04-30 PROCEDURE — ZZZZZ: CPT

## 2024-04-30 RX ORDER — NITROFURANTOIN (MONOHYDRATE/MACROCRYSTALS) 25; 75 MG/1; MG/1
100 CAPSULE ORAL TWICE DAILY
Qty: 14 | Refills: 0 | Status: ACTIVE | COMMUNITY
Start: 2024-04-30 | End: 1900-01-01

## 2024-05-01 LAB
APPEARANCE: CLEAR
BACTERIA: NEGATIVE /HPF
BILIRUBIN URINE: NEGATIVE
BLOOD URINE: ABNORMAL
CAST: 0 /LPF
COLOR: YELLOW
EPITHELIAL CELLS: 14 /HPF
GLUCOSE QUALITATIVE U: NEGATIVE MG/DL
KETONES URINE: NEGATIVE MG/DL
LEUKOCYTE ESTERASE URINE: ABNORMAL
MICROSCOPIC-UA: NORMAL
NITRITE URINE: NEGATIVE
PH URINE: 6.5
PROTEIN URINE: NORMAL MG/DL
RED BLOOD CELLS URINE: 35 /HPF
SPECIFIC GRAVITY URINE: 1.02
UROBILINOGEN URINE: 0.2 MG/DL
WHITE BLOOD CELLS URINE: 14 /HPF

## 2024-05-05 ENCOUNTER — NON-APPOINTMENT (OUTPATIENT)
Age: 34
End: 2024-05-05

## 2024-05-05 LAB — BACTERIA UR CULT: ABNORMAL

## 2024-06-30 RX ORDER — ASPIRIN/CALCIUM CARB/MAGNESIUM 324 MG
1 TABLET ORAL
Qty: 0 | Refills: 0 | DISCHARGE

## 2024-06-30 RX ORDER — ACETAMINOPHEN 500 MG
2 TABLET ORAL
Qty: 0 | Refills: 0 | DISCHARGE

## 2024-06-30 RX ORDER — ENOXAPARIN SODIUM 100 MG/ML
0 INJECTION SUBCUTANEOUS
Qty: 0 | Refills: 0 | DISCHARGE

## 2024-06-30 RX ORDER — PROGESTERONE 200 MG/1
0 CAPSULE, LIQUID FILLED ORAL
Qty: 0 | Refills: 0 | DISCHARGE

## 2024-06-30 RX ORDER — ASPIRIN/CALCIUM CARB/MAGNESIUM 324 MG
0 TABLET ORAL
Qty: 0 | Refills: 0 | DISCHARGE

## 2025-01-15 NOTE — OB PROVIDER TRIAGE NOTE - NSGENETICTESTING_OBGYN_ALL_OB
Interventional Radiology    HPI: 47y Female with recurrent abdominal wall seroma s/p drainage presenting for abdominal drain check, possible exchange/up-sizing.    Allergies: [This allergen will not trigger allergy alert] COLD urticaria - has epi pen (Rash; Urticaria)  adhesives (Rash)  No Known Drug Allergies    Medications (Abx/Cardiac/Anticoagulation/Blood Products)    Exam  General: No acute distress  Chest: Non labored breathing  Abdomen: Non-distended  Extremities: No swelling, warm    Plan:   47y Female with recurrent abdominal wall seroma s/p drainage presenting for abdominal drain check, possible exchange/up-sizing.  -- Relevant imaging and labs were reviewed.   -- Risks, benefits, and alternatives were explained to the patient and informed consent was obtained. No, other reason

## 2025-01-28 ENCOUNTER — INPATIENT (INPATIENT)
Facility: HOSPITAL | Age: 35
LOS: 2 days | Discharge: HOME | End: 2025-01-31
Attending: INTERNAL MEDICINE | Admitting: INTERNAL MEDICINE
Payer: COMMERCIAL

## 2025-01-28 VITALS
RESPIRATION RATE: 55 BRPM | SYSTOLIC BLOOD PRESSURE: 109 MMHG | WEIGHT: 130.07 LBS | OXYGEN SATURATION: 97 % | TEMPERATURE: 97 F | DIASTOLIC BLOOD PRESSURE: 74 MMHG | HEART RATE: 22 BPM | HEIGHT: 65 IN

## 2025-01-28 DIAGNOSIS — E11.10 TYPE 2 DIABETES MELLITUS WITH KETOACIDOSIS WITHOUT COMA: ICD-10-CM

## 2025-01-28 DIAGNOSIS — Z98.890 OTHER SPECIFIED POSTPROCEDURAL STATES: Chronic | ICD-10-CM

## 2025-01-28 PROBLEM — I82.409 ACUTE EMBOLISM AND THROMBOSIS OF UNSPECIFIED DEEP VEINS OF UNSPECIFIED LOWER EXTREMITY: Chronic | Status: ACTIVE | Noted: 2019-12-05

## 2025-01-28 PROBLEM — Q24.0 DEXTROCARDIA: Chronic | Status: ACTIVE | Noted: 2019-12-05

## 2025-01-28 PROBLEM — Q21.0 VENTRICULAR SEPTAL DEFECT: Chronic | Status: ACTIVE | Noted: 2019-12-05

## 2025-01-28 PROBLEM — Q24.9 CONGENITAL MALFORMATION OF HEART, UNSPECIFIED: Chronic | Status: ACTIVE | Noted: 2019-12-05

## 2025-01-28 LAB
A1C WITH ESTIMATED AVERAGE GLUCOSE RESULT: 14.4 % — HIGH (ref 4–5.6)
ADD ON TEST-SPECIMEN IN LAB: SIGNIFICANT CHANGE UP
ALBUMIN SERPL ELPH-MCNC: 3.6 G/DL — SIGNIFICANT CHANGE UP (ref 3.3–5)
ALBUMIN SERPL ELPH-MCNC: 3.7 G/DL — SIGNIFICANT CHANGE UP (ref 3.3–5)
ALBUMIN SERPL ELPH-MCNC: 4.4 G/DL — SIGNIFICANT CHANGE UP (ref 3.3–5)
ALP SERPL-CCNC: 36 U/L — LOW (ref 40–120)
ALP SERPL-CCNC: 37 U/L — LOW (ref 40–120)
ALP SERPL-CCNC: 48 U/L — SIGNIFICANT CHANGE UP (ref 40–120)
ALT FLD-CCNC: 10 U/L — SIGNIFICANT CHANGE UP (ref 4–33)
ALT FLD-CCNC: 11 U/L — SIGNIFICANT CHANGE UP (ref 4–33)
ALT FLD-CCNC: 14 U/L — SIGNIFICANT CHANGE UP (ref 4–33)
ANION GAP SERPL CALC-SCNC: 12 MMOL/L — SIGNIFICANT CHANGE UP (ref 7–14)
ANION GAP SERPL CALC-SCNC: 16 MMOL/L — HIGH (ref 7–14)
ANION GAP SERPL CALC-SCNC: >23 MMOL/L — HIGH (ref 7–14)
APAP SERPL-MCNC: <10 UG/ML — LOW (ref 15–25)
APPEARANCE UR: CLEAR — SIGNIFICANT CHANGE UP
APTT BLD: 30.5 SEC — SIGNIFICANT CHANGE UP (ref 24.5–35.6)
AST SERPL-CCNC: 16 U/L — SIGNIFICANT CHANGE UP (ref 4–32)
AST SERPL-CCNC: 16 U/L — SIGNIFICANT CHANGE UP (ref 4–32)
AST SERPL-CCNC: 17 U/L — SIGNIFICANT CHANGE UP (ref 4–32)
B-OH-BUTYR SERPL-SCNC: 4.8 MMOL/L — HIGH (ref 0–0.4)
B-OH-BUTYR SERPL-SCNC: 8.1 MMOL/L — HIGH (ref 0–0.4)
BASOPHILS # BLD AUTO: 0 K/UL — SIGNIFICANT CHANGE UP (ref 0–0.2)
BASOPHILS # BLD AUTO: 0.01 K/UL — SIGNIFICANT CHANGE UP (ref 0–0.2)
BASOPHILS NFR BLD AUTO: 0 % — SIGNIFICANT CHANGE UP (ref 0–2)
BASOPHILS NFR BLD AUTO: 0.3 % — SIGNIFICANT CHANGE UP (ref 0–2)
BILIRUB SERPL-MCNC: 0.3 MG/DL — SIGNIFICANT CHANGE UP (ref 0.2–1.2)
BILIRUB SERPL-MCNC: 0.4 MG/DL — SIGNIFICANT CHANGE UP (ref 0.2–1.2)
BILIRUB SERPL-MCNC: 0.4 MG/DL — SIGNIFICANT CHANGE UP (ref 0.2–1.2)
BILIRUB UR-MCNC: NEGATIVE — SIGNIFICANT CHANGE UP
BLOOD GAS VENOUS COMPREHENSIVE RESULT: SIGNIFICANT CHANGE UP
BUN SERPL-MCNC: 11 MG/DL — SIGNIFICANT CHANGE UP (ref 7–23)
BUN SERPL-MCNC: 12 MG/DL — SIGNIFICANT CHANGE UP (ref 7–23)
BUN SERPL-MCNC: 14 MG/DL — SIGNIFICANT CHANGE UP (ref 7–23)
CALCIUM SERPL-MCNC: 7.3 MG/DL — LOW (ref 8.4–10.5)
CALCIUM SERPL-MCNC: 7.5 MG/DL — LOW (ref 8.4–10.5)
CALCIUM SERPL-MCNC: 7.9 MG/DL — LOW (ref 8.4–10.5)
CHLORIDE SERPL-SCNC: 110 MMOL/L — HIGH (ref 98–107)
CHLORIDE SERPL-SCNC: 110 MMOL/L — HIGH (ref 98–107)
CHLORIDE SERPL-SCNC: 99 MMOL/L — SIGNIFICANT CHANGE UP (ref 98–107)
CHOLEST SERPL-MCNC: 171 MG/DL — SIGNIFICANT CHANGE UP
CO2 SERPL-SCNC: 11 MMOL/L — LOW (ref 22–31)
CO2 SERPL-SCNC: 8 MMOL/L — CRITICAL LOW (ref 22–31)
CO2 SERPL-SCNC: <7 MMOL/L — CRITICAL LOW (ref 22–31)
COLOR SPEC: YELLOW — SIGNIFICANT CHANGE UP
CREAT ?TM UR-MCNC: 40 MG/DL — SIGNIFICANT CHANGE UP
CREAT SERPL-MCNC: 0.72 MG/DL — SIGNIFICANT CHANGE UP (ref 0.5–1.3)
CREAT SERPL-MCNC: 0.78 MG/DL — SIGNIFICANT CHANGE UP (ref 0.5–1.3)
CREAT SERPL-MCNC: 0.85 MG/DL — SIGNIFICANT CHANGE UP (ref 0.5–1.3)
DIFF PNL FLD: ABNORMAL
EGFR: 102 ML/MIN/1.73M2 — SIGNIFICANT CHANGE UP
EGFR: 112 ML/MIN/1.73M2 — SIGNIFICANT CHANGE UP
EGFR: 92 ML/MIN/1.73M2 — SIGNIFICANT CHANGE UP
EOSINOPHIL # BLD AUTO: 0 K/UL — SIGNIFICANT CHANGE UP (ref 0–0.5)
EOSINOPHIL # BLD AUTO: 0 K/UL — SIGNIFICANT CHANGE UP (ref 0–0.5)
EOSINOPHIL NFR BLD AUTO: 0 % — SIGNIFICANT CHANGE UP (ref 0–6)
EOSINOPHIL NFR BLD AUTO: 0 % — SIGNIFICANT CHANGE UP (ref 0–6)
ESTIMATED AVERAGE GLUCOSE: 367 — SIGNIFICANT CHANGE UP
ETHANOL SERPL-MCNC: <10 MG/DL — SIGNIFICANT CHANGE UP
FLUAV AG NPH QL: DETECTED
FLUBV AG NPH QL: SIGNIFICANT CHANGE UP
GAS PNL BLDV: SIGNIFICANT CHANGE UP
GLUCOSE BLDC GLUCOMTR-MCNC: 145 MG/DL — HIGH (ref 70–99)
GLUCOSE BLDC GLUCOMTR-MCNC: 151 MG/DL — HIGH (ref 70–99)
GLUCOSE BLDC GLUCOMTR-MCNC: 162 MG/DL — HIGH (ref 70–99)
GLUCOSE BLDC GLUCOMTR-MCNC: 177 MG/DL — HIGH (ref 70–99)
GLUCOSE BLDC GLUCOMTR-MCNC: 198 MG/DL — HIGH (ref 70–99)
GLUCOSE BLDC GLUCOMTR-MCNC: 206 MG/DL — HIGH (ref 70–99)
GLUCOSE BLDC GLUCOMTR-MCNC: 272 MG/DL — HIGH (ref 70–99)
GLUCOSE SERPL-MCNC: 171 MG/DL — HIGH (ref 70–99)
GLUCOSE SERPL-MCNC: 180 MG/DL — HIGH (ref 70–99)
GLUCOSE SERPL-MCNC: 381 MG/DL — HIGH (ref 70–99)
GLUCOSE UR QL: >=1000 MG/DL
HCG SERPL-ACNC: <1 MIU/ML — SIGNIFICANT CHANGE UP
HCT VFR BLD CALC: 39.1 % — SIGNIFICANT CHANGE UP (ref 34.5–45)
HCT VFR BLD CALC: 47.6 % — HIGH (ref 34.5–45)
HDLC SERPL-MCNC: 28 MG/DL — LOW
HGB BLD-MCNC: 13.7 G/DL — SIGNIFICANT CHANGE UP (ref 11.5–15.5)
HGB BLD-MCNC: 15.6 G/DL — HIGH (ref 11.5–15.5)
IANC: 2.56 K/UL — SIGNIFICANT CHANGE UP (ref 1.8–7.4)
IANC: 2.66 K/UL — SIGNIFICANT CHANGE UP (ref 1.8–7.4)
IMM GRANULOCYTES NFR BLD AUTO: 1.5 % — HIGH (ref 0–0.9)
INR BLD: 0.95 RATIO — SIGNIFICANT CHANGE UP (ref 0.85–1.16)
KETONES UR-MCNC: >=160 MG/DL
LEUKOCYTE ESTERASE UR-ACNC: NEGATIVE — SIGNIFICANT CHANGE UP
LIPID PNL WITH DIRECT LDL SERPL: 123 MG/DL — HIGH
LYMPHOCYTES # BLD AUTO: 0.32 K/UL — LOW (ref 1–3.3)
LYMPHOCYTES # BLD AUTO: 0.72 K/UL — LOW (ref 1–3.3)
LYMPHOCYTES # BLD AUTO: 18.3 % — SIGNIFICANT CHANGE UP (ref 13–44)
LYMPHOCYTES # BLD AUTO: 7.8 % — LOW (ref 13–44)
MAGNESIUM SERPL-MCNC: 1.9 MG/DL — SIGNIFICANT CHANGE UP (ref 1.6–2.6)
MAGNESIUM SERPL-MCNC: 2.3 MG/DL — SIGNIFICANT CHANGE UP (ref 1.6–2.6)
MCHC RBC-ENTMCNC: 29.8 PG — SIGNIFICANT CHANGE UP (ref 27–34)
MCHC RBC-ENTMCNC: 31.4 PG — SIGNIFICANT CHANGE UP (ref 27–34)
MCHC RBC-ENTMCNC: 32.8 G/DL — SIGNIFICANT CHANGE UP (ref 32–36)
MCHC RBC-ENTMCNC: 35 G/DL — SIGNIFICANT CHANGE UP (ref 32–36)
MCV RBC AUTO: 89.5 FL — SIGNIFICANT CHANGE UP (ref 80–100)
MCV RBC AUTO: 90.8 FL — SIGNIFICANT CHANGE UP (ref 80–100)
MONOCYTES # BLD AUTO: 0.53 K/UL — SIGNIFICANT CHANGE UP (ref 0–0.9)
MONOCYTES # BLD AUTO: 0.58 K/UL — SIGNIFICANT CHANGE UP (ref 0–0.9)
MONOCYTES NFR BLD AUTO: 13.1 % — SIGNIFICANT CHANGE UP (ref 2–14)
MONOCYTES NFR BLD AUTO: 14.8 % — HIGH (ref 2–14)
NEUTROPHILS # BLD AUTO: 2.56 K/UL — SIGNIFICANT CHANGE UP (ref 1.8–7.4)
NEUTROPHILS # BLD AUTO: 3.08 K/UL — SIGNIFICANT CHANGE UP (ref 1.8–7.4)
NEUTROPHILS NFR BLD AUTO: 65.1 % — SIGNIFICANT CHANGE UP (ref 43–77)
NEUTROPHILS NFR BLD AUTO: 70.4 % — SIGNIFICANT CHANGE UP (ref 43–77)
NITRITE UR-MCNC: NEGATIVE — SIGNIFICANT CHANGE UP
NON HDL CHOLESTEROL: 143 MG/DL — HIGH
NRBC # BLD AUTO: 0 K/UL — SIGNIFICANT CHANGE UP (ref 0–0)
NRBC # BLD: 0 /100 WBCS — SIGNIFICANT CHANGE UP (ref 0–0)
NRBC # FLD: 0 K/UL — SIGNIFICANT CHANGE UP (ref 0–0)
NRBC BLD-RTO: 0 /100 WBCS — SIGNIFICANT CHANGE UP (ref 0–0)
NT-PROBNP SERPL-SCNC: 992 PG/ML — HIGH
PH UR: 5.5 — SIGNIFICANT CHANGE UP (ref 5–8)
PHOSPHATE SERPL-MCNC: 1.2 MG/DL — LOW (ref 2.5–4.5)
PHOSPHATE SERPL-MCNC: 1.3 MG/DL — LOW (ref 2.5–4.5)
PLATELET # BLD AUTO: 106 K/UL — LOW (ref 150–400)
PLATELET # BLD AUTO: 129 K/UL — LOW (ref 150–400)
POTASSIUM SERPL-MCNC: 3.8 MMOL/L — SIGNIFICANT CHANGE UP (ref 3.5–5.3)
POTASSIUM SERPL-MCNC: 4 MMOL/L — SIGNIFICANT CHANGE UP (ref 3.5–5.3)
POTASSIUM SERPL-MCNC: 4.1 MMOL/L — SIGNIFICANT CHANGE UP (ref 3.5–5.3)
POTASSIUM SERPL-SCNC: 3.8 MMOL/L — SIGNIFICANT CHANGE UP (ref 3.5–5.3)
POTASSIUM SERPL-SCNC: 4 MMOL/L — SIGNIFICANT CHANGE UP (ref 3.5–5.3)
POTASSIUM SERPL-SCNC: 4.1 MMOL/L — SIGNIFICANT CHANGE UP (ref 3.5–5.3)
POTASSIUM UR-SCNC: 42.2 MMOL/L — SIGNIFICANT CHANGE UP
PROT SERPL-MCNC: 6.1 G/DL — SIGNIFICANT CHANGE UP (ref 6–8.3)
PROT SERPL-MCNC: 6.3 G/DL — SIGNIFICANT CHANGE UP (ref 6–8.3)
PROT SERPL-MCNC: 7.7 G/DL — SIGNIFICANT CHANGE UP (ref 6–8.3)
PROT UR-MCNC: 100 MG/DL
PROTHROM AB SERPL-ACNC: 11.3 SEC — SIGNIFICANT CHANGE UP (ref 9.9–13.4)
RBC # BLD: 4.37 M/UL — SIGNIFICANT CHANGE UP (ref 3.8–5.2)
RBC # BLD: 5.24 M/UL — HIGH (ref 3.8–5.2)
RBC # FLD: 13.7 % — SIGNIFICANT CHANGE UP (ref 10.3–14.5)
RBC # FLD: 14 % — SIGNIFICANT CHANGE UP (ref 10.3–14.5)
RSV RNA NPH QL NAA+NON-PROBE: SIGNIFICANT CHANGE UP
SALICYLATES SERPL-MCNC: <0.3 MG/DL — LOW (ref 15–30)
SARS-COV-2 RNA SPEC QL NAA+PROBE: SIGNIFICANT CHANGE UP
SODIUM SERPL-SCNC: 129 MMOL/L — LOW (ref 135–145)
SODIUM SERPL-SCNC: 133 MMOL/L — LOW (ref 135–145)
SODIUM SERPL-SCNC: 134 MMOL/L — LOW (ref 135–145)
SODIUM UR-SCNC: 47 MMOL/L — SIGNIFICANT CHANGE UP
SP GR SPEC: 1.03 — SIGNIFICANT CHANGE UP (ref 1–1.03)
TRIGL SERPL-MCNC: 109 MG/DL — SIGNIFICANT CHANGE UP
TROPONIN T, HIGH SENSITIVITY RESULT: <6 NG/L — SIGNIFICANT CHANGE UP
TSH SERPL-MCNC: 2.17 UIU/ML — SIGNIFICANT CHANGE UP (ref 0.27–4.2)
UROBILINOGEN FLD QL: 0.2 MG/DL — SIGNIFICANT CHANGE UP (ref 0.2–1)
UUN UR-MCNC: 406 MG/DL — SIGNIFICANT CHANGE UP
WBC # BLD: 3.93 K/UL — SIGNIFICANT CHANGE UP (ref 3.8–10.5)
WBC # BLD: 4.08 K/UL — SIGNIFICANT CHANGE UP (ref 3.8–10.5)
WBC # FLD AUTO: 3.93 K/UL — SIGNIFICANT CHANGE UP (ref 3.8–10.5)
WBC # FLD AUTO: 4.08 K/UL — SIGNIFICANT CHANGE UP (ref 3.8–10.5)

## 2025-01-28 PROCEDURE — 99291 CRITICAL CARE FIRST HOUR: CPT | Mod: GC

## 2025-01-28 PROCEDURE — 99285 EMERGENCY DEPT VISIT HI MDM: CPT

## 2025-01-28 PROCEDURE — 71275 CT ANGIOGRAPHY CHEST: CPT | Mod: 26

## 2025-01-28 PROCEDURE — 71045 X-RAY EXAM CHEST 1 VIEW: CPT | Mod: 26

## 2025-01-28 RX ORDER — SODIUM CHLORIDE 9 G/ML
1000 INJECTION, SOLUTION INTRAVENOUS ONCE
Refills: 0 | Status: COMPLETED | OUTPATIENT
Start: 2025-01-28 | End: 2025-01-28

## 2025-01-28 RX ORDER — SODIUM PHOSPHATE, DIBASIC, ANHYDROUS, POTASSIUM PHOSPHATE, MONOBASIC, AND SODIUM PHOSPHATE, MONOBASIC, MONOHYDRATE 852; 155; 130 MG/1; MG/1; MG/1
2 TABLET, COATED ORAL ONCE
Refills: 0 | Status: COMPLETED | OUTPATIENT
Start: 2025-01-28 | End: 2025-01-28

## 2025-01-28 RX ORDER — MAGNESIUM SULFATE 0.8 MEQ/ML
2 AMPUL (ML) INJECTION ONCE
Refills: 0 | Status: COMPLETED | OUTPATIENT
Start: 2025-01-28 | End: 2025-01-28

## 2025-01-28 RX ORDER — SODIUM CHLORIDE 9 G/ML
1000 INJECTION, SOLUTION INTRAVENOUS
Refills: 0 | Status: DISCONTINUED | OUTPATIENT
Start: 2025-01-28 | End: 2025-01-28

## 2025-01-28 RX ORDER — SOD PHOSPHATE,MONOBASIC-DIBAS 3MMOL/ML
30 VIAL (ML) INTRAVENOUS ONCE
Refills: 0 | Status: DISCONTINUED | OUTPATIENT
Start: 2025-01-28 | End: 2025-01-28

## 2025-01-28 RX ORDER — POTASSIUM CHLORIDE 750 MG/1
20 TABLET, EXTENDED RELEASE ORAL ONCE
Refills: 0 | Status: COMPLETED | OUTPATIENT
Start: 2025-01-28 | End: 2025-01-28

## 2025-01-28 RX ORDER — ENOXAPARIN SODIUM 100 MG/ML
40 INJECTION SUBCUTANEOUS EVERY 24 HOURS
Refills: 0 | Status: DISCONTINUED | OUTPATIENT
Start: 2025-01-28 | End: 2025-01-29

## 2025-01-28 RX ORDER — POTASSIUM CHLORIDE 750 MG/1
40 TABLET, EXTENDED RELEASE ORAL ONCE
Refills: 0 | Status: COMPLETED | OUTPATIENT
Start: 2025-01-28 | End: 2025-01-28

## 2025-01-28 RX ORDER — SODIUM CHLORIDE 9 G/ML
1000 INJECTION, SOLUTION INTRAVENOUS
Refills: 0 | Status: DISCONTINUED | OUTPATIENT
Start: 2025-01-28 | End: 2025-01-29

## 2025-01-28 RX ORDER — POTASSIUM PHOSPHATE, MONOBASIC POTASSIUM PHOSPHATE, DIBASIC 236; 224 MG/ML; MG/ML
30 INJECTION, SOLUTION INTRAVENOUS ONCE
Refills: 0 | Status: COMPLETED | OUTPATIENT
Start: 2025-01-28 | End: 2025-01-28

## 2025-01-28 RX ORDER — OSELTAMIVIR PHOSPHATE 75 MG/1
75 CAPSULE ORAL
Refills: 0 | Status: DISCONTINUED | OUTPATIENT
Start: 2025-01-28 | End: 2025-01-31

## 2025-01-28 RX ORDER — ANTISEPTIC SURGICAL SCRUB 0.04 MG/ML
1 SOLUTION TOPICAL
Refills: 0 | Status: DISCONTINUED | OUTPATIENT
Start: 2025-01-28 | End: 2025-01-31

## 2025-01-28 RX ORDER — DM/PSEUDOEPHED/ACETAMINOPHEN 10-30-250
25 CAPSULE ORAL ONCE
Refills: 0 | Status: DISCONTINUED | OUTPATIENT
Start: 2025-01-28 | End: 2025-01-31

## 2025-01-28 RX ORDER — SODIUM PHOSPHATE, DIBASIC, ANHYDROUS, POTASSIUM PHOSPHATE, MONOBASIC, AND SODIUM PHOSPHATE, MONOBASIC, MONOHYDRATE 852; 155; 130 MG/1; MG/1; MG/1
1 TABLET, COATED ORAL ONCE
Refills: 0 | Status: DISCONTINUED | OUTPATIENT
Start: 2025-01-28 | End: 2025-01-28

## 2025-01-28 RX ORDER — DM/PSEUDOEPHED/ACETAMINOPHEN 10-30-250
12.5 CAPSULE ORAL ONCE
Refills: 0 | Status: DISCONTINUED | OUTPATIENT
Start: 2025-01-28 | End: 2025-01-31

## 2025-01-28 RX ADMIN — SODIUM CHLORIDE 150 MILLILITER(S): 9 INJECTION, SOLUTION INTRAVENOUS at 18:00

## 2025-01-28 RX ADMIN — Medication 5 UNIT(S)/HR: at 22:48

## 2025-01-28 RX ADMIN — POTASSIUM CHLORIDE 20 MILLIEQUIVALENT(S): 750 TABLET, EXTENDED RELEASE ORAL at 20:56

## 2025-01-28 RX ADMIN — Medication 25 GRAM(S): at 21:09

## 2025-01-28 RX ADMIN — SODIUM CHLORIDE 1000 MILLILITER(S): 9 INJECTION, SOLUTION INTRAVENOUS at 15:15

## 2025-01-28 RX ADMIN — POTASSIUM PHOSPHATE, MONOBASIC POTASSIUM PHOSPHATE, DIBASIC 83.33 MILLIMOLE(S): 236; 224 INJECTION, SOLUTION INTRAVENOUS at 22:48

## 2025-01-28 RX ADMIN — Medication 5 UNIT(S)/HR: at 15:26

## 2025-01-28 RX ADMIN — SODIUM CHLORIDE 1000 MILLILITER(S): 9 INJECTION, SOLUTION INTRAVENOUS at 15:17

## 2025-01-28 RX ADMIN — POTASSIUM CHLORIDE 40 MILLIEQUIVALENT(S): 750 TABLET, EXTENDED RELEASE ORAL at 15:15

## 2025-01-28 RX ADMIN — OSELTAMIVIR PHOSPHATE 75 MILLIGRAM(S): 75 CAPSULE ORAL at 20:56

## 2025-01-28 RX ADMIN — SODIUM PHOSPHATE, DIBASIC, ANHYDROUS, POTASSIUM PHOSPHATE, MONOBASIC, AND SODIUM PHOSPHATE, MONOBASIC, MONOHYDRATE 2 PACKET(S): 852; 155; 130 TABLET, COATED ORAL at 22:13

## 2025-01-28 NOTE — ED PROVIDER NOTE - ATTENDING CONTRIBUTION TO CARE
Attending MD Copeland: I have seen and examined this patient and fully participated in the care of this patient as the teaching attending. I personally made/approved the management plan and take responsibility for the patient management.       Patient is a 34-year-old female history of DVT not on anticoagulation, dextrocardia presenting to the ED with generalized weakness.  Also endorsing polyuria polydipsia x 3 weeks.  No history of diabetes.  Mom with history of type 1 diabetes.  Feels generalized pruritus as well, flushed skin however no rashes no chest pain however patient is endorsing shortness of breath.  Not on any birth control, no recent travel no leg edema at this time.  On exam patient appears in mild distress, clear lungs, no abdominal tenderness palpation.  Will get labs evaluate for new onset diabetes/DKA, electrolyte abnormalities, give shortness of breath will get CT to evaluate for PE as patient is very tachypneic on evaluation.  Disposition pending labs and imaging.    *The above represents an initial assessment/impression. Please refer to progress notes for potential changes in patient clinical course*

## 2025-01-28 NOTE — ED ADULT NURSE REASSESSMENT NOTE - NS ED NURSE REASSESS COMMENT FT1
Repeat FS obtained. NAKIA Gilmore at bedside to change insulin gtt rate. Spontaneous respirations are even and unlabored. Pt denies chest pain, dizziness, n/v. Family at bedside. Repeat FS obtained. NAKIA Gilmore at bedside to change insulin gtt rate. Spontaneous respirations are even and unlabored. Normal sinus on the cardiac monitor. Pt denies chest pain, dizziness, n/v. Family at bedside.

## 2025-01-28 NOTE — H&P ADULT - ASSESSMENT
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)  - No active issues, sees pediatric cardiology (last seen 1/17/23)    ======RESPIRATORY=====  - No active issues, pt on RA    =====GI/NUTRITION=====  - Diet: NPO while on insulin gtt    =====/RENAL=====  - No active issues    =====INFECTIOUS DISEASE=====  #Influenza A  - Tamiflu 5 days    =====ENDOCRINE=====  #DKA  Pt not on any medications at home, no previous diagnosis of DM  High anion gap metabolic acidosis  A1C 14.4  - on insulin gtt 5u/hr  - BMP q4h  - endocrinology consult  - will check COLLEEN labs: FIOR Ab, zinc transported 8 ab, IA-2 ab, anti-insulin antibodies    =====HEMATOLOGIC/DVT PPX=====  - No active issues    - DVT PPX: Lovenox     =====ETHICS=====  - Code status: full code  - Dispo: remain in MICU 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)  - No active issues, sees pediatric cardiology (last seen 1/17/23)      ======RESPIRATORY=====  - No active issues, pt on RA      =====GI/NUTRITION=====  - Diet: NPO while on insulin gtt      =====/RENAL=====  - No active issues      =====INFECTIOUS DISEASE=====  #Influenza A  - Start oseltamivir 75 BID 5 days (1/28-2/1)      =====ENDOCRINE=====  #DKA  #Suspect COLLEEN  - Pt not on any medications at home, no previous diagnosis of DM  - High anion gap metabolic acidosis with elevated BHB c/w DKA  - A1C 14.4  - Start DKA protocol  - Insulin gtt 5u/hr  - Start D5 LR 150cc/hr  - BMP q4h  - endocrinology consult  - will check COLLEEN labs; zinc transporter antibody, glutamic acid decarboxylase antibody, Islet antigen (IA-2) antibody      =====HEMATOLOGIC/DVT PPX=====  - No active issues    - DVT PPX: Lovenox       =====ETHICS=====  - Code status: full code  - Dispo: remain in MICU

## 2025-01-28 NOTE — H&P ADULT - HISTORY OF PRESENT ILLNESS
34-year-old female past medical history of prior DVTs not on anticoagulation, dextrocardia/VSD s/p Fontan procedure presenting with multiple medical complaints.  Patient endorsing generalized weakness with increased thirst and urinary frequency over the past 3 weeks.  States over the past few days she has had increasing shortness of breath with dyspnea on exertion. Felt so weak that she could barely do anything. Patient also endorsing sensation of skin flushing and itchiness. Denies any pain to the skin/rash. Denies any chest pain.  Denies any tobacco use.  Denies any fevers, chills, headache, dizziness, abdominal pain, nausea vomiting diarrhea.  Denies any recent travel or sick contacts. No history of starvation, alcohol use, no med such as metformin or salicylate use. Pt not on any medications. 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    Labs notable for VBG with pH of 6.96 and bicarb less than 7.  Anion gap of 23, blood glucose 300s, beta hydroxybutyrate 8.1.  Patient receiving 1  liter of fluid and added a subsequent liter.  Will give p.o. potassium given K is 4.1.  34-year-old female past medical history of prior DVTs not on anticoagulation, dextrocardia/VSD s/p Fontan procedure presenting with multiple medical complaints.  Patient endorsing generalized weakness with increased thirst and urinary frequency over the past 3 weeks.  States over the past few days she has had increasing shortness of breath with dyspnea on exertion. Felt so weak that she could barely do anything. Patient also endorsing sensation of skin flushing and itchiness. Denies any pain to the skin/rash. Denies any chest pain.  Denies any tobacco use.  Denies any fevers, chills, headache, dizziness, abdominal pain, nausea vomiting diarrhea.  Denies any recent travel or sick contacts. No history of starvation, alcohol use, no med such as metformin or salicylate use. Pt not on any medications. 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    Labs notable for VBG with pH of 6.96 and bicarb less than 7.  Anion gap of 23, blood glucose 300s, beta hydroxybutyrate 8.1.  Patient received 2L LR in ED, insulin drip started. Pt positive for Influenza A, CXR with clear lungs with dextrocardia.

## 2025-01-28 NOTE — H&P ADULT - NSICDXPASTMEDICALHX_GEN_ALL_CORE_FT
PAST MEDICAL HISTORY:  Congenital heart disease     Dextrocardia     DVT, lower extremity "the right I think"-2005/2006    Ventricular septal defect (VSD)

## 2025-01-28 NOTE — ED ADULT NURSE REASSESSMENT NOTE - NS ED NURSE REASSESS COMMENT FT1
Repeat FS obtained, no change in insulin gtt needed. Repeat FS obtained, no change in insulin gtt needed. Spontaneous respirations are even and unlabored on room air. No acute distress noted. Pt denies pain at this time. Pt denies chest pain, n/v, dizziness. Family at bedside. Repeat FS obtained, no change in insulin gtt needed. Spontaneous respirations are even and unlabored on room air. Normal sinus noted on cardiac monitor. No acute distress noted. Pt denies pain at this time. Pt denies chest pain, n/v, dizziness. Family at bedside.

## 2025-01-28 NOTE — ED PROVIDER NOTE - PROGRESS NOTE DETAILS
Reina Copeland MD (Attending MD): VBG with pH of 6.96 and bicarb less than 7.  Repeat pending.  Received call from lab with anion gap of 23,  Less than 7, blood glucose 300s, beta hydroxybutyrate 8.1.  patient receiving 1  liter of fluid and added a subsequent liter.  Will give p.o. potassium given K is 4.1.   MICU consulted.  Will get weight and start insulin.  Confirmed with patient no history of starvation, alcohol use, no med such as metformin or salicylate use. Luis Gleason DO (PGY3)  Patient accepted to the MICU - q1 fingersticks in place.

## 2025-01-28 NOTE — ED PROVIDER NOTE - CLINICAL SUMMARY MEDICAL DECISION MAKING FREE TEXT BOX
34-year-old female past medical history of prior DVTs not on anticoagulation, dextrocardia presenting with multiple medical complaints.  Patient endorsing generalized weakness with increased thirst and urinary frequency over the past 3 weeks.  States over the past few days she has had increasing shortness of breath with dyspnea on exertion. Patient also endorsing sensation of skin flushing and itchiness. Denies any pain to the skin/rash. Denies any chest pain.  Denies any tobacco use.  Denies any fevers, chills, headache, dizziness, abdominal pain, nausea vomiting diarrhea.  Denies any recent travel or sick contacts.    Luis Gleason DO (PGY3)   Physical Exam:    Gen: NAD, AOx3  Head: NCAT  HEENT: EOMI, PEERLA  Lung: CTAB, labored breathing on initial exam   CV: RRR, no murmurs, rubs or gallops  Abd: soft, NT, ND, no guarding, no rigidity, no rebound tenderness, no CVA tenderness   MSK: no visible deformities, ROM normal in UE/LE, no back pain  Neuro: No focal sensory or motor deficits. Sensation intact to light touch all extremities.  Skin: Warm, well perfused, +facial flushing     Vital signs stable, afebrile, not hypoxic. Plan for basic labs, cardiac markers, UA/UCx, beta hydroxybutyrate, CTA chest  Differential diagnosis includes but not limited to PE vs. infectious etiology vs. metabolic derangement 34-year-old female past medical history of prior DVTs not on anticoagulation, dextrocardia presenting with multiple medical complaints.  Patient endorsing generalized weakness with increased thirst and urinary frequency over the past 3 weeks.  States over the past few days she has had increasing shortness of breath with dyspnea on exertion. Patient also endorsing sensation of skin flushing and itchiness. Denies any pain to the skin/rash. Denies any chest pain.  Denies any tobacco use.  Denies any fevers, chills, headache, dizziness, abdominal pain, nausea vomiting diarrhea.  Denies any recent travel or sick contacts.    Luis Gleason DO (PGY3)   Physical Exam:    Gen: NAD, AOx3  Head: NCAT  HEENT: EOMI, PEERLA  Lung: CTAB, labored breathing on initial exam   CV: RRR, no murmurs, rubs or gallops  Abd: soft, NT, ND, no guarding, no rigidity, no rebound tenderness, no CVA tenderness   MSK: no visible deformities, ROM normal in UE/LE, no back pain  Neuro: No focal sensory or motor deficits. Sensation intact to light touch all extremities.  Skin: Warm, well perfused, +facial flushing     Vital signs stable, afebrile, not hypoxic. Plan for basic labs, cardiac markers, UA/UCx, beta hydroxybutyrate, CTA chest  Differential diagnosis includes but not limited to PE vs. infectious etiology vs. DKA vs. PE vs. viral syndrome

## 2025-01-28 NOTE — ED ADULT NURSE NOTE - OBJECTIVE STATEMENT
Pt is A+O4 and ambulatory at baseline. Pt has hx of dextrocardia and DVT. Pt endorses Pt able to speak 4-6 words in a sentence. Spontaneous respirations are even and unlabored. No accessory muscle use or increased work of breathing noted. No audible wheezing. Abdomen is soft, nontender, nondistended. Pt is A+O4 and ambulatory at baseline. Pt has hx of dextrocardia and DVT. Pt endorses generalized weakness, SOB, increased thirst/frequency x 3 weeks. Pt able to speak 4-6 words in a sentence. Spontaneous respirations are even and unlabored. No accessory muscle use or increased work of breathing noted. No audible wheezing. Abdomen is soft, nontender, nondistended. Denies chest pain, dizziness, blurry vision. Sinus thomas noted on cardiac monitor. 20G IV placed to right AC, labs collected and sent.

## 2025-01-28 NOTE — ED ADULT TRIAGE NOTE - CHIEF COMPLAINT QUOTE
Pt coming to ER c/o generalized weakness with increased thirst and increased urinary frequency over the last year. However as of three days ago patient started experiencing shortness of breath and dyspnea on exertion. Pt noted to be breathing labored at this time. Denies chest pain, headache, dizziness. Saturating 97% on RA. Hx congenital heart disease

## 2025-01-28 NOTE — H&P ADULT - NSHPLABSRESULTS_GEN_ALL_CORE
LABS:                         15.6   4.08  )-----------( 129      ( 2025 13:32 )             47.6         129[L]  |  99  |  14  ----------------------------<  381[H]  4.1   |  <7[LL]  |  0.85    Ca    7.9[L]      2025 13:32    TPro  7.7  /  Alb  4.4  /  TBili  0.4  /  DBili  x   /  AST  17  /  ALT  14  /  AlkPhos  48      PT/INR - ( 2025 13:32 )   PT: 11.3 sec;   INR: 0.95 ratio         PTT - ( 2025 13:32 )  PTT:30.5 sec  Urinalysis Basic - ( 2025 15:30 )    Color: Yellow / Appearance: Clear / S.028 / pH: x  Gluc: x / Ketone: >=160 mg/dL  / Bili: Negative / Urobili: 0.2 mg/dL   Blood: x / Protein: 100 mg/dL / Nitrite: Negative   Leuk Esterase: Negative / RBC: x / WBC x   Sq Epi: x / Non Sq Epi: x / Bacteria: x      CARDIAC MARKERS ( 2025 13:32 )  x     / x     / x     / x     / 3.0 ng/mL            RADIOLOGY, EKG & ADDITIONAL TESTS:

## 2025-01-28 NOTE — PROVIDER CONTACT NOTE (CRITICAL VALUE NOTIFICATION) - PERSON GIVING RESULT:
Progress Notes by Jessica Eden DO at 03/01/17 11:57 AM     Author:  Jessica Eden DO Service:  (none) Author Type:  Physician     Filed:  03/01/17 12:05 PM Encounter Date:  3/1/2017 Status:  Signed     :  Jessica Eden DO (Physician)            Last visit with JESSICA EDEN was on 11/03/2013 at  1:00 PM in [facility]  Last visit with [facility] was on 02/28/2017 at  1:55 PM in [facility] SEQ  Match done based on reference date of today 3/1/17    Chart note dictated.[MB1.1T]      Revision History        User Key Date/Time User Provider Type Action    > MB1.1 03/01/17 12:05 PM Jessica Eden DO Physician Sign    T - Template             ANA Ortega, lab

## 2025-01-28 NOTE — H&P ADULT - NSHPPHYSICALEXAM_GEN_ALL_CORE
GEN: NAD. A&Ox3. Non-toxic appearing.  HEENT: normocephalic, atraumatic, EOMI, PERRL, no scleral icterus, no conjunctival pallor, moist MM  CARDIAC: RRR, S1, S2, no murmur. Radial pulses present and symmetric b/l  PULM: CTA B/L no wheeze, rhonchi, rales.   ABD: soft NT, ND, no rebound no guarding  MSK: Moving all extremities, no edema. 5/5 strength and full ROM in all extremities.     NEURO: No focal neurological deficits, CN 2-12 grossly intact  SKIN: warm, dry, no rash.

## 2025-01-28 NOTE — ED ADULT NURSE NOTE - NSFALLRISKINTERV_ED_ALL_ED

## 2025-01-28 NOTE — ED ADULT NURSE REASSESSMENT NOTE - NS ED NURSE REASSESS COMMENT FT1
Mobile Critical Care RN: patient is 34/F PMHx of prior DVTs not on anticoagulation, dextrocardia presenting with multiple medical complaints.  Patient endorsing generalized weakness with increased thirst and urinary frequency over the past 3 weeks.  States over the past few days she has had increasing shortness of breath with dyspnea on exertion. patient found to be flu positive and in DKA. patient remains in room 9 at this time, family at bedside, denies pain and afebrile. patient on room air, noted to have some dyspnea on exertion with no desaturation. no cough noted. thomas on tele monitor, leads applied in reverse for history of dextrocardia. normotensive, MAP above 65. PIV x2. tolerating PO meds, no complaints of nausea/vomiting/abdominal pain. adequate urine output noted. skin is dry and intact. MICU consulted for insulin gtt in the setting of DKA. FS monitored hourly. repeat VBG/CMP to be drawn at 1730. CT

## 2025-01-28 NOTE — H&P ADULT - ATTENDING COMMENTS
34 F with congenital heart disease with single ventricle physiology culminating in a lateral tunnel Fontan found to have HAGMA/lactic acidosis due to DKA and influenza A    Will start insulin gtt, q1h finger sticks  c/w IVF  monitor for hypokalemia and hypophosphatemia  tamiflu for influenza A, monitor off abx  will try to get more collateral from adult congenital heart disease program

## 2025-01-29 ENCOUNTER — RESULT REVIEW (OUTPATIENT)
Age: 35
End: 2025-01-29

## 2025-01-29 DIAGNOSIS — E83.51 HYPOCALCEMIA: ICD-10-CM

## 2025-01-29 DIAGNOSIS — E11.10 TYPE 2 DIABETES MELLITUS WITH KETOACIDOSIS WITHOUT COMA: ICD-10-CM

## 2025-01-29 DIAGNOSIS — E11.9 TYPE 2 DIABETES MELLITUS WITHOUT COMPLICATIONS: ICD-10-CM

## 2025-01-29 LAB
ADD ON TEST-SPECIMEN IN LAB: SIGNIFICANT CHANGE UP
ALBUMIN SERPL ELPH-MCNC: 2.8 G/DL — LOW (ref 3.3–5)
ALBUMIN SERPL ELPH-MCNC: 3 G/DL — LOW (ref 3.3–5)
ALBUMIN SERPL ELPH-MCNC: 3 G/DL — LOW (ref 3.3–5)
ALBUMIN SERPL ELPH-MCNC: 3.1 G/DL — LOW (ref 3.3–5)
ALBUMIN SERPL ELPH-MCNC: 3.1 G/DL — LOW (ref 3.3–5)
ALP SERPL-CCNC: 28 U/L — LOW (ref 40–120)
ALP SERPL-CCNC: 29 U/L — LOW (ref 40–120)
ALP SERPL-CCNC: 30 U/L — LOW (ref 40–120)
ALP SERPL-CCNC: 31 U/L — LOW (ref 40–120)
ALP SERPL-CCNC: 33 U/L — LOW (ref 40–120)
ALT FLD-CCNC: 10 U/L — SIGNIFICANT CHANGE UP (ref 4–33)
ALT FLD-CCNC: 11 U/L — SIGNIFICANT CHANGE UP (ref 4–33)
ALT FLD-CCNC: 7 U/L — SIGNIFICANT CHANGE UP (ref 4–33)
ALT FLD-CCNC: 9 U/L — SIGNIFICANT CHANGE UP (ref 4–33)
ALT FLD-CCNC: 9 U/L — SIGNIFICANT CHANGE UP (ref 4–33)
ANION GAP SERPL CALC-SCNC: 11 MMOL/L — SIGNIFICANT CHANGE UP (ref 7–14)
ANION GAP SERPL CALC-SCNC: 9 MMOL/L — SIGNIFICANT CHANGE UP (ref 7–14)
ANION GAP SERPL CALC-SCNC: 9 MMOL/L — SIGNIFICANT CHANGE UP (ref 7–14)
APTT BLD: 30.3 SEC — SIGNIFICANT CHANGE UP (ref 24.5–35.6)
AST SERPL-CCNC: 13 U/L — SIGNIFICANT CHANGE UP (ref 4–32)
AST SERPL-CCNC: 16 U/L — SIGNIFICANT CHANGE UP (ref 4–32)
AST SERPL-CCNC: 17 U/L — SIGNIFICANT CHANGE UP (ref 4–32)
AST SERPL-CCNC: 21 U/L — SIGNIFICANT CHANGE UP (ref 4–32)
AST SERPL-CCNC: 26 U/L — SIGNIFICANT CHANGE UP (ref 4–32)
B-OH-BUTYR SERPL-SCNC: 0.6 MMOL/L — HIGH (ref 0–0.4)
B-OH-BUTYR SERPL-SCNC: 0.9 MMOL/L — HIGH (ref 0–0.4)
B-OH-BUTYR SERPL-SCNC: 1 MMOL/L — HIGH (ref 0–0.4)
BASOPHILS # BLD AUTO: 0 K/UL — SIGNIFICANT CHANGE UP (ref 0–0.2)
BASOPHILS NFR BLD AUTO: 0 % — SIGNIFICANT CHANGE UP (ref 0–2)
BILIRUB SERPL-MCNC: 0.3 MG/DL — SIGNIFICANT CHANGE UP (ref 0.2–1.2)
BUN SERPL-MCNC: 10 MG/DL — SIGNIFICANT CHANGE UP (ref 7–23)
BUN SERPL-MCNC: 10 MG/DL — SIGNIFICANT CHANGE UP (ref 7–23)
BUN SERPL-MCNC: 7 MG/DL — SIGNIFICANT CHANGE UP (ref 7–23)
BUN SERPL-MCNC: 8 MG/DL — SIGNIFICANT CHANGE UP (ref 7–23)
BUN SERPL-MCNC: 9 MG/DL — SIGNIFICANT CHANGE UP (ref 7–23)
CA-I BLD-SCNC: 1.09 MMOL/L — LOW (ref 1.15–1.29)
CALCIUM SERPL-MCNC: 7.3 MG/DL — LOW (ref 8.4–10.5)
CALCIUM SERPL-MCNC: 7.6 MG/DL — LOW (ref 8.4–10.5)
CALCIUM SERPL-MCNC: 7.6 MG/DL — LOW (ref 8.4–10.5)
CALCIUM SERPL-MCNC: 7.7 MG/DL — LOW (ref 8.4–10.5)
CALCIUM SERPL-MCNC: 7.7 MG/DL — LOW (ref 8.4–10.5)
CHLORIDE SERPL-SCNC: 109 MMOL/L — HIGH (ref 98–107)
CHLORIDE SERPL-SCNC: 109 MMOL/L — HIGH (ref 98–107)
CHLORIDE SERPL-SCNC: 110 MMOL/L — HIGH (ref 98–107)
CHLORIDE SERPL-SCNC: 112 MMOL/L — HIGH (ref 98–107)
CHLORIDE SERPL-SCNC: 112 MMOL/L — HIGH (ref 98–107)
CK MB BLD-MCNC: 3.4 % — HIGH (ref 0–2.5)
CK MB CFR SERPL CALC: 2.1 NG/ML — SIGNIFICANT CHANGE UP
CK SERPL-CCNC: 61 U/L — SIGNIFICANT CHANGE UP (ref 25–170)
CO2 SERPL-SCNC: 12 MMOL/L — LOW (ref 22–31)
CO2 SERPL-SCNC: 14 MMOL/L — LOW (ref 22–31)
CO2 SERPL-SCNC: 15 MMOL/L — LOW (ref 22–31)
CO2 SERPL-SCNC: 16 MMOL/L — LOW (ref 22–31)
CO2 SERPL-SCNC: 16 MMOL/L — LOW (ref 22–31)
CREAT SERPL-MCNC: 0.57 MG/DL — SIGNIFICANT CHANGE UP (ref 0.5–1.3)
CREAT SERPL-MCNC: 0.6 MG/DL — SIGNIFICANT CHANGE UP (ref 0.5–1.3)
CREAT SERPL-MCNC: 0.61 MG/DL — SIGNIFICANT CHANGE UP (ref 0.5–1.3)
CREAT SERPL-MCNC: 0.63 MG/DL — SIGNIFICANT CHANGE UP (ref 0.5–1.3)
CREAT SERPL-MCNC: 0.65 MG/DL — SIGNIFICANT CHANGE UP (ref 0.5–1.3)
EGFR: 118 ML/MIN/1.73M2 — SIGNIFICANT CHANGE UP
EGFR: 119 ML/MIN/1.73M2 — SIGNIFICANT CHANGE UP
EGFR: 119 ML/MIN/1.73M2 — SIGNIFICANT CHANGE UP
EGFR: 120 ML/MIN/1.73M2 — SIGNIFICANT CHANGE UP
EGFR: 121 ML/MIN/1.73M2 — SIGNIFICANT CHANGE UP
EOSINOPHIL # BLD AUTO: 0.01 K/UL — SIGNIFICANT CHANGE UP (ref 0–0.5)
EOSINOPHIL NFR BLD AUTO: 0.4 % — SIGNIFICANT CHANGE UP (ref 0–6)
GAS PNL BLDV: SIGNIFICANT CHANGE UP
GLUCOSE BLDC GLUCOMTR-MCNC: 103 MG/DL — HIGH (ref 70–99)
GLUCOSE BLDC GLUCOMTR-MCNC: 139 MG/DL — HIGH (ref 70–99)
GLUCOSE BLDC GLUCOMTR-MCNC: 141 MG/DL — HIGH (ref 70–99)
GLUCOSE BLDC GLUCOMTR-MCNC: 151 MG/DL — HIGH (ref 70–99)
GLUCOSE BLDC GLUCOMTR-MCNC: 156 MG/DL — HIGH (ref 70–99)
GLUCOSE BLDC GLUCOMTR-MCNC: 159 MG/DL — HIGH (ref 70–99)
GLUCOSE BLDC GLUCOMTR-MCNC: 160 MG/DL — HIGH (ref 70–99)
GLUCOSE BLDC GLUCOMTR-MCNC: 180 MG/DL — HIGH (ref 70–99)
GLUCOSE BLDC GLUCOMTR-MCNC: 180 MG/DL — HIGH (ref 70–99)
GLUCOSE BLDC GLUCOMTR-MCNC: 183 MG/DL — HIGH (ref 70–99)
GLUCOSE BLDC GLUCOMTR-MCNC: 186 MG/DL — HIGH (ref 70–99)
GLUCOSE BLDC GLUCOMTR-MCNC: 190 MG/DL — HIGH (ref 70–99)
GLUCOSE BLDC GLUCOMTR-MCNC: 193 MG/DL — HIGH (ref 70–99)
GLUCOSE BLDC GLUCOMTR-MCNC: 194 MG/DL — HIGH (ref 70–99)
GLUCOSE BLDC GLUCOMTR-MCNC: 199 MG/DL — HIGH (ref 70–99)
GLUCOSE BLDC GLUCOMTR-MCNC: 201 MG/DL — HIGH (ref 70–99)
GLUCOSE BLDC GLUCOMTR-MCNC: 201 MG/DL — HIGH (ref 70–99)
GLUCOSE BLDC GLUCOMTR-MCNC: 204 MG/DL — HIGH (ref 70–99)
GLUCOSE BLDC GLUCOMTR-MCNC: 214 MG/DL — HIGH (ref 70–99)
GLUCOSE BLDC GLUCOMTR-MCNC: 218 MG/DL — HIGH (ref 70–99)
GLUCOSE BLDC GLUCOMTR-MCNC: 219 MG/DL — HIGH (ref 70–99)
GLUCOSE BLDC GLUCOMTR-MCNC: 230 MG/DL — HIGH (ref 70–99)
GLUCOSE SERPL-MCNC: 155 MG/DL — HIGH (ref 70–99)
GLUCOSE SERPL-MCNC: 171 MG/DL — HIGH (ref 70–99)
GLUCOSE SERPL-MCNC: 181 MG/DL — HIGH (ref 70–99)
GLUCOSE SERPL-MCNC: 213 MG/DL — HIGH (ref 70–99)
GLUCOSE SERPL-MCNC: 222 MG/DL — HIGH (ref 70–99)
HCT VFR BLD CALC: 36.3 % — SIGNIFICANT CHANGE UP (ref 34.5–45)
HGB BLD-MCNC: 12.7 G/DL — SIGNIFICANT CHANGE UP (ref 11.5–15.5)
IANC: 1.52 K/UL — LOW (ref 1.8–7.4)
IMM GRANULOCYTES NFR BLD AUTO: 0.8 % — SIGNIFICANT CHANGE UP (ref 0–0.9)
INR BLD: 1.09 RATIO — SIGNIFICANT CHANGE UP (ref 0.85–1.16)
LYMPHOCYTES # BLD AUTO: 0.79 K/UL — LOW (ref 1–3.3)
LYMPHOCYTES # BLD AUTO: 30.3 % — SIGNIFICANT CHANGE UP (ref 13–44)
MAGNESIUM SERPL-MCNC: 1.8 MG/DL — SIGNIFICANT CHANGE UP (ref 1.6–2.6)
MAGNESIUM SERPL-MCNC: 1.8 MG/DL — SIGNIFICANT CHANGE UP (ref 1.6–2.6)
MAGNESIUM SERPL-MCNC: 1.9 MG/DL — SIGNIFICANT CHANGE UP (ref 1.6–2.6)
MAGNESIUM SERPL-MCNC: 1.9 MG/DL — SIGNIFICANT CHANGE UP (ref 1.6–2.6)
MAGNESIUM SERPL-MCNC: 2.1 MG/DL — SIGNIFICANT CHANGE UP (ref 1.6–2.6)
MCHC RBC-ENTMCNC: 30.8 PG — SIGNIFICANT CHANGE UP (ref 27–34)
MCHC RBC-ENTMCNC: 35 G/DL — SIGNIFICANT CHANGE UP (ref 32–36)
MCV RBC AUTO: 87.9 FL — SIGNIFICANT CHANGE UP (ref 80–100)
MONOCYTES # BLD AUTO: 0.27 K/UL — SIGNIFICANT CHANGE UP (ref 0–0.9)
MONOCYTES NFR BLD AUTO: 10.3 % — SIGNIFICANT CHANGE UP (ref 2–14)
MRSA PCR RESULT.: SIGNIFICANT CHANGE UP
NEUTROPHILS # BLD AUTO: 1.52 K/UL — LOW (ref 1.8–7.4)
NEUTROPHILS NFR BLD AUTO: 58.2 % — SIGNIFICANT CHANGE UP (ref 43–77)
NRBC # BLD AUTO: 0 K/UL — SIGNIFICANT CHANGE UP (ref 0–0)
NRBC # BLD: 0 /100 WBCS — SIGNIFICANT CHANGE UP (ref 0–0)
NRBC # FLD: 0 K/UL — SIGNIFICANT CHANGE UP (ref 0–0)
NRBC BLD-RTO: 0 /100 WBCS — SIGNIFICANT CHANGE UP (ref 0–0)
PHOSPHATE SERPL-MCNC: 1 MG/DL — CRITICAL LOW (ref 2.5–4.5)
PHOSPHATE SERPL-MCNC: 1.5 MG/DL — LOW (ref 2.5–4.5)
PHOSPHATE SERPL-MCNC: 2.3 MG/DL — LOW (ref 2.5–4.5)
PHOSPHATE SERPL-MCNC: 3.1 MG/DL — SIGNIFICANT CHANGE UP (ref 2.5–4.5)
PHOSPHATE SERPL-MCNC: 3.1 MG/DL — SIGNIFICANT CHANGE UP (ref 2.5–4.5)
PLATELET # BLD AUTO: 93 K/UL — LOW (ref 150–400)
POTASSIUM SERPL-MCNC: 3.2 MMOL/L — LOW (ref 3.5–5.3)
POTASSIUM SERPL-MCNC: 3.6 MMOL/L — SIGNIFICANT CHANGE UP (ref 3.5–5.3)
POTASSIUM SERPL-MCNC: 3.6 MMOL/L — SIGNIFICANT CHANGE UP (ref 3.5–5.3)
POTASSIUM SERPL-MCNC: 3.8 MMOL/L — SIGNIFICANT CHANGE UP (ref 3.5–5.3)
POTASSIUM SERPL-MCNC: 4 MMOL/L — SIGNIFICANT CHANGE UP (ref 3.5–5.3)
POTASSIUM SERPL-SCNC: 3.2 MMOL/L — LOW (ref 3.5–5.3)
POTASSIUM SERPL-SCNC: 3.6 MMOL/L — SIGNIFICANT CHANGE UP (ref 3.5–5.3)
POTASSIUM SERPL-SCNC: 3.6 MMOL/L — SIGNIFICANT CHANGE UP (ref 3.5–5.3)
POTASSIUM SERPL-SCNC: 3.8 MMOL/L — SIGNIFICANT CHANGE UP (ref 3.5–5.3)
POTASSIUM SERPL-SCNC: 4 MMOL/L — SIGNIFICANT CHANGE UP (ref 3.5–5.3)
PROT SERPL-MCNC: 4.8 G/DL — LOW (ref 6–8.3)
PROT SERPL-MCNC: 5.1 G/DL — LOW (ref 6–8.3)
PROT SERPL-MCNC: 5.1 G/DL — LOW (ref 6–8.3)
PROT SERPL-MCNC: 5.2 G/DL — LOW (ref 6–8.3)
PROT SERPL-MCNC: 5.5 G/DL — LOW (ref 6–8.3)
PROTHROM AB SERPL-ACNC: 12.6 SEC — SIGNIFICANT CHANGE UP (ref 9.9–13.4)
RBC # BLD: 4.13 M/UL — SIGNIFICANT CHANGE UP (ref 3.8–5.2)
RBC # FLD: 13.9 % — SIGNIFICANT CHANGE UP (ref 10.3–14.5)
S AUREUS DNA NOSE QL NAA+PROBE: SIGNIFICANT CHANGE UP
SODIUM SERPL-SCNC: 133 MMOL/L — LOW (ref 135–145)
SODIUM SERPL-SCNC: 134 MMOL/L — LOW (ref 135–145)
SODIUM SERPL-SCNC: 135 MMOL/L — SIGNIFICANT CHANGE UP (ref 135–145)
SODIUM SERPL-SCNC: 137 MMOL/L — SIGNIFICANT CHANGE UP (ref 135–145)
SODIUM SERPL-SCNC: 137 MMOL/L — SIGNIFICANT CHANGE UP (ref 135–145)
TROPONIN T, HIGH SENSITIVITY RESULT: <6 NG/L — SIGNIFICANT CHANGE UP
WBC # BLD: 2.61 K/UL — LOW (ref 3.8–10.5)
WBC # FLD AUTO: 2.61 K/UL — LOW (ref 3.8–10.5)

## 2025-01-29 PROCEDURE — 93325 DOPPLER ECHO COLOR FLOW MAPG: CPT | Mod: 26

## 2025-01-29 PROCEDURE — 93010 ELECTROCARDIOGRAM REPORT: CPT

## 2025-01-29 PROCEDURE — 99254 IP/OBS CNSLTJ NEW/EST MOD 60: CPT

## 2025-01-29 PROCEDURE — 93303 ECHO TRANSTHORACIC: CPT | Mod: 26

## 2025-01-29 PROCEDURE — 99223 1ST HOSP IP/OBS HIGH 75: CPT

## 2025-01-29 PROCEDURE — 99291 CRITICAL CARE FIRST HOUR: CPT | Mod: GC

## 2025-01-29 PROCEDURE — 93320 DOPPLER ECHO COMPLETE: CPT | Mod: 26

## 2025-01-29 RX ORDER — SODIUM PHOSPHATE, DIBASIC, ANHYDROUS, POTASSIUM PHOSPHATE, MONOBASIC, AND SODIUM PHOSPHATE, MONOBASIC, MONOHYDRATE 852; 155; 130 MG/1; MG/1; MG/1
1 TABLET, COATED ORAL EVERY 6 HOURS
Refills: 0 | Status: DISCONTINUED | OUTPATIENT
Start: 2025-01-29 | End: 2025-01-29

## 2025-01-29 RX ORDER — SODIUM PHOSPHATE, DIBASIC, ANHYDROUS, POTASSIUM PHOSPHATE, MONOBASIC, AND SODIUM PHOSPHATE, MONOBASIC, MONOHYDRATE 852; 155; 130 MG/1; MG/1; MG/1
1 TABLET, COATED ORAL ONCE
Refills: 0 | Status: COMPLETED | OUTPATIENT
Start: 2025-01-29 | End: 2025-01-29

## 2025-01-29 RX ORDER — MAGNESIUM SULFATE 0.8 MEQ/ML
2 AMPUL (ML) INJECTION ONCE
Refills: 0 | Status: COMPLETED | OUTPATIENT
Start: 2025-01-29 | End: 2025-01-29

## 2025-01-29 RX ORDER — INSULIN GLARGINE-YFGN 100 [IU]/ML
18 INJECTION, SOLUTION SUBCUTANEOUS AT BEDTIME
Refills: 0 | Status: DISCONTINUED | OUTPATIENT
Start: 2025-01-30 | End: 2025-01-31

## 2025-01-29 RX ORDER — POTASSIUM CHLORIDE 750 MG/1
20 TABLET, EXTENDED RELEASE ORAL ONCE
Refills: 0 | Status: COMPLETED | OUTPATIENT
Start: 2025-01-29 | End: 2025-01-29

## 2025-01-29 RX ORDER — DM/PSEUDOEPHED/ACETAMINOPHEN 10-30-250
15 CAPSULE ORAL ONCE
Refills: 0 | Status: DISCONTINUED | OUTPATIENT
Start: 2025-01-29 | End: 2025-01-31

## 2025-01-29 RX ORDER — SODIUM CHLORIDE 9 G/ML
1000 INJECTION, SOLUTION INTRAVENOUS
Refills: 0 | Status: DISCONTINUED | OUTPATIENT
Start: 2025-01-29 | End: 2025-01-31

## 2025-01-29 RX ORDER — POTASSIUM CHLORIDE 750 MG/1
40 TABLET, EXTENDED RELEASE ORAL ONCE
Refills: 0 | Status: COMPLETED | OUTPATIENT
Start: 2025-01-29 | End: 2025-01-29

## 2025-01-29 RX ORDER — SODIUM PHOSPHATE, DIBASIC, ANHYDROUS, POTASSIUM PHOSPHATE, MONOBASIC, AND SODIUM PHOSPHATE, MONOBASIC, MONOHYDRATE 852; 155; 130 MG/1; MG/1; MG/1
2 TABLET, COATED ORAL ONCE
Refills: 0 | Status: DISCONTINUED | OUTPATIENT
Start: 2025-01-29 | End: 2025-01-29

## 2025-01-29 RX ORDER — INSULIN GLARGINE-YFGN 100 [IU]/ML
18 INJECTION, SOLUTION SUBCUTANEOUS ONCE
Refills: 0 | Status: COMPLETED | OUTPATIENT
Start: 2025-01-29 | End: 2025-01-29

## 2025-01-29 RX ORDER — GLUCAGON 3 MG/1
1 POWDER NASAL ONCE
Refills: 0 | Status: DISCONTINUED | OUTPATIENT
Start: 2025-01-29 | End: 2025-01-31

## 2025-01-29 RX ORDER — POTASSIUM PHOSPHATE, MONOBASIC POTASSIUM PHOSPHATE, DIBASIC 236; 224 MG/ML; MG/ML
30 INJECTION, SOLUTION INTRAVENOUS ONCE
Refills: 0 | Status: COMPLETED | OUTPATIENT
Start: 2025-01-29 | End: 2025-01-29

## 2025-01-29 RX ORDER — APIXABAN 5 MG/1
5 TABLET, FILM COATED ORAL
Refills: 0 | Status: DISCONTINUED | OUTPATIENT
Start: 2025-01-29 | End: 2025-01-31

## 2025-01-29 RX ORDER — INSULIN LISPRO 100/ML
VIAL (ML) SUBCUTANEOUS
Refills: 0 | Status: DISCONTINUED | OUTPATIENT
Start: 2025-01-29 | End: 2025-01-31

## 2025-01-29 RX ORDER — INSULIN LISPRO 100/ML
5 VIAL (ML) SUBCUTANEOUS
Refills: 0 | Status: DISCONTINUED | OUTPATIENT
Start: 2025-01-29 | End: 2025-01-30

## 2025-01-29 RX ORDER — INSULIN GLARGINE-YFGN 100 [IU]/ML
18 INJECTION, SOLUTION SUBCUTANEOUS AT BEDTIME
Refills: 0 | Status: DISCONTINUED | OUTPATIENT
Start: 2025-01-29 | End: 2025-01-29

## 2025-01-29 RX ORDER — INSULIN LISPRO 100/ML
VIAL (ML) SUBCUTANEOUS AT BEDTIME
Refills: 0 | Status: DISCONTINUED | OUTPATIENT
Start: 2025-01-29 | End: 2025-01-31

## 2025-01-29 RX ADMIN — OSELTAMIVIR PHOSPHATE 75 MILLIGRAM(S): 75 CAPSULE ORAL at 06:06

## 2025-01-29 RX ADMIN — ENOXAPARIN SODIUM 40 MILLIGRAM(S): 100 INJECTION SUBCUTANEOUS at 00:09

## 2025-01-29 RX ADMIN — POTASSIUM PHOSPHATE, MONOBASIC POTASSIUM PHOSPHATE, DIBASIC 83.33 MILLIMOLE(S): 236; 224 INJECTION, SOLUTION INTRAVENOUS at 17:25

## 2025-01-29 RX ADMIN — Medication 5 UNIT(S): at 17:30

## 2025-01-29 RX ADMIN — Medication 200 GRAM(S): at 02:14

## 2025-01-29 RX ADMIN — SODIUM PHOSPHATE, DIBASIC, ANHYDROUS, POTASSIUM PHOSPHATE, MONOBASIC, AND SODIUM PHOSPHATE, MONOBASIC, MONOHYDRATE 1 TABLET(S): 852; 155; 130 TABLET, COATED ORAL at 21:54

## 2025-01-29 RX ADMIN — ANTISEPTIC SURGICAL SCRUB 1 APPLICATION(S): 0.04 SOLUTION TOPICAL at 08:39

## 2025-01-29 RX ADMIN — SODIUM PHOSPHATE, DIBASIC, ANHYDROUS, POTASSIUM PHOSPHATE, MONOBASIC, AND SODIUM PHOSPHATE, MONOBASIC, MONOHYDRATE 1 TABLET(S): 852; 155; 130 TABLET, COATED ORAL at 17:25

## 2025-01-29 RX ADMIN — POTASSIUM CHLORIDE 20 MILLIEQUIVALENT(S): 750 TABLET, EXTENDED RELEASE ORAL at 06:07

## 2025-01-29 RX ADMIN — Medication 25 GRAM(S): at 23:06

## 2025-01-29 RX ADMIN — Medication 0: at 21:58

## 2025-01-29 RX ADMIN — POTASSIUM CHLORIDE 40 MILLIEQUIVALENT(S): 750 TABLET, EXTENDED RELEASE ORAL at 23:05

## 2025-01-29 RX ADMIN — APIXABAN 5 MILLIGRAM(S): 5 TABLET, FILM COATED ORAL at 17:25

## 2025-01-29 RX ADMIN — OSELTAMIVIR PHOSPHATE 75 MILLIGRAM(S): 75 CAPSULE ORAL at 17:40

## 2025-01-29 RX ADMIN — INSULIN GLARGINE-YFGN 18 UNIT(S): 100 INJECTION, SOLUTION SUBCUTANEOUS at 17:24

## 2025-01-29 NOTE — PATIENT PROFILE ADULT - FALL HARM RISK - UNIVERSAL INTERVENTIONS
Bed in lowest position, wheels locked, appropriate side rails in place/Call bell, personal items and telephone in reach/Instruct patient to call for assistance before getting out of bed or chair/Non-slip footwear when patient is out of bed/Rachel to call system/Physically safe environment - no spills, clutter or unnecessary equipment/Purposeful Proactive Rounding/Room/bathroom lighting operational, light cord in reach

## 2025-01-29 NOTE — CONSULT NOTE ADULT - SUBJECTIVE AND OBJECTIVE BOX
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 Nemesio hasn't been followed by [facility] since 2023 because she just hasn't been able to make an appointment. Prior to this admission she was doing well from a cardiac perspective, exercising multiple times a week with no symptoms. She also has not taken Eliquis in over a year, but had no particular reason for discontinuing it. Starting a week or two ago she noticed she was having increased thirst, increased urinary frequency, skin flushing/itchiness, and fatigue. Then a few days ago she also began experiencing increased shortness of breath with exertion, so she came to the ED to be evaluated. She was found upon arrival to be Flu A positive, and in DKA with pH 6.96, bicarb <7, anion gap 23, BG 300s, beta hydroxybutyrate 8.1. She is currently in the ICU on IV fluids and insulin gtt. The ED also performed a CTA chest to r/o PE, however it was not timed correctly for her congenital heart disease, and had scattered filling defects throughout the pulmonary arteries. She is feeling better today, saturating well on room air. Nemesio also does note she recently had a 5 hour drive, where she did not get up and move around much. Nemesio denies any active fevers, chills, CP, palpitations, syncope, near syncope, leg swelling, abdominal pain, N/V/D, headache, or dizziness.     REVIEW OF SYSTEMS:  Constitutional - no fever, no poor weight gain.  Eyes - no conjunctivitis, no discharge.  Ears / Nose / Mouth / Throat - no congestion, no stridor.  Respiratory - no tachypnea, no increased work of breathing.  Cardiovascular - no cyanosis, no syncope.  Gastrointestinal - no vomiting, no diarrhea.  Integumentary - no rash, no pallor.  Musculoskeletal - no joint swelling, no joint stiffness.  Endocrine - no jitteriness, no failure to thrive.  Neurological - no seizures, no change in activity level.    PAST MEDICAL/SURGICAL HISTORY:  Medical Problems - see HPI for details.  Surgical History - see HPI for details.  Allergies - vancomycin (Hives)  amoxicillin (Hives)  penicillin (Hives)    MEDICATIONS:  oseltamivir 75 milliGRAM(s) Oral two times a day  dextrose 5% + lactated ringers 1000 milliLiter(s) IV Continuous <Continuous>  dextrose 50% Injectable 25 Gram(s) IV Push once  dextrose 50% Injectable 12.5 Gram(s) IV Push once  enoxaparin Injectable 40 milliGRAM(s) SubCutaneous every 24 hours  influenza   Vaccine 0.5 milliLiter(s) IntraMuscular once  insulin regular Infusion 2.3 Unit(s)/Hr IV Continuous <Continuous>    FAMILY HISTORY:  Brother- Sudden death in his 30s   Aunt- CVA    SOCIAL HISTORY:  The patient lives with her  and daughter.   Works as a .   No smoking, drinking, or illicit drug use.   Exercises multiple time a week.    PHYSICAL EXAMINATION:  Vital signs - Weight (kg): 59.1 ( @ 23:00)  T(C): 36.2 (25 @ 08:00), Max: 36.6 (25 @ 21:48)  HR: 63 (25 @ 13:00) (51 - 73)  BP: 96/60 (25 @ 13:00) (93/57 - 118/64)  ABP: --  RR: 15 (25 @ 13:00) (11 - 18)  SpO2: 96% (25 @ 13:00) (96% - 100%)  CVP(mm Hg): --  General - non-dysmorphic, well-developed.  Skin - no rash, no cyanosis.  Eyes / ENT - external appearance of eyes, ears, & nares normal.  Pulmonary - normal inspiratory effort, no retractions, lungs clear bilaterally, no wheezes, no rales.  Cardiovascular - normal rate, regular rhythm, normal S1 & S2, III/VI systolic murmur RLSB, no rubs, no gallops, capillary refill < 2sec, normal pulses.  Gastrointestinal - soft, no hepatomegaly.  Musculoskeletal - no clubbing, no edema.  Neurologic / Psychiatric - moves all extremities, normal tone.    LABORATORY TESTS                          12.7  CBC:   2.61 )-----------( 93   (25 @ 02:30)                          36.3               135   |  109   |  8                  Ca: 7.7    BMP:   ----------------------------< 213    M.90  (25 @ 11:21)             3.6    |  15    | 0.61               Ph: 1.5      LFT:     TPro: 5.1 / Alb: 3.1 / TBili: 0.3 / DBili: x / AST: 16 / ALT: 9 / AlkPhos: 29   (25 @ 11:21)    COAG: PT: 12.6 / PTT: 30.3 / INR: 1.09   (25 @ 02:30)     VBG:   pH: 7.31 / pCO2: 32 / pO2: 120 / HCO3: 16 / Base Excess: -9.1 / SaO2: 99.1   (25 @ 11:21)  IMAGING STUDIES:  Electrocardiogram -SR 1st degree AV block, diffuse T wave inversions anterolaterally, QTc 491    Telemetry - SR, some junctional bradycardia, SB    Chest x-ray -   < from: Xray Chest 1 View- PORTABLE-Urgent (25 @ 12:34) >  FINDINGS:    Dextrocardia with sternotomy wires.  The lungs are clear, the heart is not enlarged and there is no effusion   or pneumothorax.  The bones are normal.          COMPARISON: 2017        IMPRESSION: Clear lungs with dextrocardia.    < end of copied text >      < from: CT Angio Chest PE Protocol w/ IV Cont (25 @ 19:56) >  FINDINGS:    LUNGS AND LARGE AIRWAYS: Patent central airways. Scattered subsegmental   atelectasis in the right middle and lower lobes. No consolidation.  PLEURA: No pleural effusion.  VESSELS: Patent Jordan anastomosis of the SVC to the pulmonary artery.   Fontan anastomosis of the IVC to the pulmonary artery is opacified.   Asymmetric flow to the right pulmonary arteries. Left pulmonary arteries   are poorly opacified. Heterogeneous filling within the right main   pulmonary artery is likely related to mixing. Distally, however there are   numerous filling defects within multiple segmental pulmonary artery   branches throughout all three right lung lobes.  HEART: Dextrocardia. Ventricular septal defect.  MEDIASTINUM AND ZULMA: No lymphadenopathy.  CHEST WALL AND LOWER NECK: Sternotomy.  VISUALIZED UPPER ABDOMEN: Within normal limits.  BONES: Degenerative changes.    IMPRESSION:  Multiple findings related to congenital cardiac anomalies with   dextrocardia have been described in detail on the recent prior MRI of the   chest.    Asymmetric preferential perfusion to the right lung with multiple   scattered filling defects throughout numerous segmental pulmonary artery   branches throughout the right lung, which may be artifactual and/or   related to the mixing due to the underlying cardiac abnormalities;   therefore this exam may not be reliable to reliably evaluate for   pulmonary emboli; if there is strong clinical suspicion for emboli; a V/Q   scan can be considered.    Heterogeneous filling within the right main and lobar pulmonary arteries   may be related to mixing artifact.    The left pulmonary veins as well as the arteries are not opacified   therefore limited evaluation..    --- End of Report ---          < end of copied text >    Echocardiogram - Pending  
  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  Patient endorsing generalized weakness with increased thirst and urinary frequency over the past 3 weeks.  States over the past few days she has had increasing shortness of breath with dyspnea on exertion. Felt so weak that she could barely do anything. Patient also endorsing sensation of skin flushing and itchiness. Denies any pain to the skin/rash. Denies any chest pain.  Denies any tobacco use.  Denies any fevers, chills, headache, dizziness, abdominal pain, nausea vomiting diarrhea.  Denies any recent travel or sick contacts. No history of starvation, alcohol use, no med such as metformin or salicylate use. Pt not on any medications. 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    Labs notable for VBG with pH of 6.96 and bicarb less than 7.  Anion gap of 23, blood glucose 300s, beta hydroxybutyrate 8.1.  Patient received 2L LR in ED, insulin drip started. Pt positive for Influenza A, CXR with clear lungs with dextrocardia.  (28 Jan 2025 15:43)      ENDOCRINE HX:    Patient has no history of diabetes, has no personal hx of autoimmune diseases.  States she has experienced 30 lb weight loss as well as fatigue over the past 6 months.  Past few weeks she has also experienced polyuria/polydipsia.  Now admitted w/ severe DKA.  HgbA1c 14.4%  Patient does have a family hx of T1DM (mother) and thyroid disorder (brother)      PAST MEDICAL & SURGICAL HISTORY:  DVT, lower extremity  "the right I think"-2005/2006      Congenital heart disease      Ventricular septal defect (VSD)      Dextrocardia      S/P Fontan procedure  1995      H/O prior ablation treatment          FAMILY HISTORY:  FH: diabetes mellitus  mother        HOME MEDS:      MEDICATIONS  (STANDING):  chlorhexidine 2% Cloths 1 Application(s) Topical <User Schedule>  dextrose 5% + lactated ringers 1000 milliLiter(s) (150 mL/Hr) IV Continuous <Continuous>  dextrose 50% Injectable 25 Gram(s) IV Push once  dextrose 50% Injectable 12.5 Gram(s) IV Push once  enoxaparin Injectable 40 milliGRAM(s) SubCutaneous every 24 hours  influenza   Vaccine 0.5 milliLiter(s) IntraMuscular once  insulin regular Infusion 2.3 Unit(s)/Hr (2.3 mL/Hr) IV Continuous <Continuous>  oseltamivir 75 milliGRAM(s) Oral two times a day    MEDICATIONS  (PRN):      Allergies    vancomycin (Hives)  amoxicillin (Hives)  penicillin (Hives)    Intolerances      Review of Systems:  Constitutional: No fever  Eyes: No blurry vision  Neuro: No tremors  HEENT: No pain  Cardiovascular: No chest pain, palpitations  Respiratory: No SOB, no cough  GI: No nausea, vomiting, abdominal pain  : No dysuria  Skin: no rash  Psych: no depression  Endocrine: no polyuria, polydipsia  Hem/lymph: no swelling  Osteoporosis: no fractures    ALL OTHER SYSTEMS REVIEWED AND NEGATIVE    PHYSICAL EXAM:  VITALS: T(C): 36.2 (01-29-25 @ 08:00)  T(F): 97.1 (01-29-25 @ 08:00), Max: 97.8 (01-28-25 @ 21:48)  HR: 51 (01-29-25 @ 10:00) (51 - 73)  BP: 101/59 (01-29-25 @ 10:00) (93/57 - 118/64)  RR:  (11 - 18)  SpO2:  (96% - 100%)  Wt(kg): --  GENERAL: NAD, well-groomed, well-developed  EYES: No proptosis, no lid lag, anicteric  HEENT:  Atraumatic, Normocephalic, moist mucous membranes  RESPIRATORY: Normal respiratory effort; no audible wheezing  SKIN: Dry, intact, No rashes or lesions  MUSCULOSKELETAL: Full range of motion, normal strength  NEURO: sensation intact, extraocular movements intact, no tremor  PSYCH: Alert and oriented x 3, normal affect, normal mood  CUSHING'S SIGNS: no striae      CAPILLARY BLOOD GLUCOSE      POCT Blood Glucose.: 204 mg/dL (29 Jan 2025 12:08)  POCT Blood Glucose.: 190 mg/dL (29 Jan 2025 11:00)  POCT Blood Glucose.: 214 mg/dL (29 Jan 2025 10:58)  POCT Blood Glucose.: 194 mg/dL (29 Jan 2025 10:07)  POCT Blood Glucose.: 201 mg/dL (29 Jan 2025 09:09)  POCT Blood Glucose.: 219 mg/dL (29 Jan 2025 08:06)  POCT Blood Glucose.: 199 mg/dL (29 Jan 2025 07:03)  POCT Blood Glucose.: 218 mg/dL (29 Jan 2025 06:04)  POCT Blood Glucose.: 230 mg/dL (29 Jan 2025 05:04)  POCT Blood Glucose.: 186 mg/dL (29 Jan 2025 04:05)  POCT Blood Glucose.: 139 mg/dL (29 Jan 2025 03:00)  POCT Blood Glucose.: 183 mg/dL (29 Jan 2025 02:13)  POCT Blood Glucose.: 193 mg/dL (29 Jan 2025 00:58)  POCT Blood Glucose.: 201 mg/dL (29 Jan 2025 00:05)  POCT Blood Glucose.: 206 mg/dL (28 Jan 2025 23:04)  POCT Blood Glucose.: 162 mg/dL (28 Jan 2025 22:01)  POCT Blood Glucose.: 145 mg/dL (28 Jan 2025 20:55)  POCT Blood Glucose.: 151 mg/dL (28 Jan 2025 19:56)  POCT Blood Glucose.: 177 mg/dL (28 Jan 2025 18:38)  POCT Blood Glucose.: 198 mg/dL (28 Jan 2025 17:31)  POCT Blood Glucose.: 272 mg/dL (28 Jan 2025 16:29)  POCT Blood Glucose.: 320 mg/dL (28 Jan 2025 15:22)  POCT Blood Glucose.: 336 mg/dL (28 Jan 2025 14:04)                            12.7   2.61  )-----------( 93       ( 29 Jan 2025 02:30 )             36.3       01-29    135  |  109[H]  |  8   ----------------------------<  213[H]  3.6   |  15[L]  |  0.61    eGFR: 119    Ca    7.7[L]      01-29  Mg     1.90     01-29  Phos  1.5     01-29    TPro  5.1[L]  /  Alb  3.1[L]  /  TBili  0.3  /  DBili  x   /  AST  16  /  ALT  9   /  AlkPhos  29[L]  01-29      Thyroid Function Tests:  01-28 @ 22:20 TSH 2.17 FreeT4 -- T3 -- Anti TPO -- Anti Thyroglobulin Ab -- TSI --      A1C with Estimated Average Glucose Result: 14.4 % (01-28-25 @ 15:19)      01-28 Chol 171 Direct LDL -- LDL calculated 123[H] HDL 28[L] Trig 109    Radiology:

## 2025-01-29 NOTE — PROGRESS NOTE ADULT - SUBJECTIVE AND OBJECTIVE BOX
****[facility]ania [facility] PGY-1*****    Overnight Events: no acute overnight events  Interval Events: pt seen and examined at bedside    OBJECTIVE:  ICU Vital Signs Last 24 Hrs  T(C): 36.4 (29 Jan 2025 04:00), Max: 36.6 (28 Jan 2025 21:48)  T(F): 97.5 (29 Jan 2025 04:00), Max: 97.8 (28 Jan 2025 21:48)  HR: 53 (29 Jan 2025 07:00) (22 - 73)  BP: 102/61 (29 Jan 2025 07:00) (93/57 - 118/64)  BP(mean): 74 (29 Jan 2025 07:00) (63 - 83)  ABP: --  ABP(mean): --  RR: 12 (29 Jan 2025 07:00) (11 - 55)  SpO2: 98% (29 Jan 2025 07:00) (96% - 100%)    O2 Parameters below as of 29 Jan 2025 07:00  Patient On (Oxygen Delivery Method): room air              01-28 @ 07:01  -  01-29 @ 07:00  --------------------------------------------------------  IN: 1723 mL / OUT: 800 mL / NET: 923 mL      CAPILLARY BLOOD GLUCOSE      POCT Blood Glucose.: 199 mg/dL (29 Jan 2025 07:03)      PHYSICAL EXAM  GEN: NAD. A&Ox3. Non-toxic appearing.  HEENT: normocephalic, atraumatic, EOMI, PERRL, no scleral icterus, no conjunctival pallor, moist MM  CARDIAC: RRR, S1, S2, no murmur. Radial pulses present and symmetric b/l  PULM: CTA B/L no wheeze, rhonchi, rales.   ABD: soft NT, ND, no rebound no guarding  MSK: Moving all extremities, no edema. 5/5 strength and full ROM in all extremities.     NEURO: No focal neurological deficits, CN 2-12 grossly intact  SKIN: warm, dry, no rash.      HOSPITAL MEDICATIONS:  MEDICATIONS  (STANDING):  chlorhexidine 2% Cloths 1 Application(s) Topical <User Schedule>  dextrose 5% + lactated ringers 1000 milliLiter(s) (150 mL/Hr) IV Continuous <Continuous>  dextrose 50% Injectable 25 Gram(s) IV Push once  dextrose 50% Injectable 12.5 Gram(s) IV Push once  enoxaparin Injectable 40 milliGRAM(s) SubCutaneous every 24 hours  influenza   Vaccine 0.5 milliLiter(s) IntraMuscular once  insulin regular Infusion 2 Unit(s)/Hr (2 mL/Hr) IV Continuous <Continuous>  oseltamivir 75 milliGRAM(s) Oral two times a day    MEDICATIONS  (PRN):      LABS:                        12.7   2.61  )-----------( 93       ( 29 Jan 2025 02:30 )             36.3     Hgb Trend: 12.7<--, 13.7<--, 15.6<--  01-29    133[L]  |  112[H]  |  10  ----------------------------<  181[H]  3.8   |  12[L]  |  0.63    Ca    7.3[L]      29 Jan 2025 02:30  Phos  3.1     01-29  Mg     2.10     01-29    TPro  5.5[L]  /  Alb  3.0[L]  /  TBili  0.3  /  DBili  x   /  AST  17  /  ALT  7   /  AlkPhos  33[L]  01-29    Creatinine Trend: 0.63<--, 0.72<--, 0.78<--, 0.85<--  PT/INR - ( 29 Jan 2025 02:30 )   PT: 12.6 sec;   INR: 1.09 ratio         PTT - ( 29 Jan 2025 02:30 )  PTT:30.3 sec  Urinalysis Basic - ( 29 Jan 2025 02:30 )    Color: x / Appearance: x / SG: x / pH: x  Gluc: 181 mg/dL / Ketone: x  / Bili: x / Urobili: x   Blood: x / Protein: x / Nitrite: x   Leuk Esterase: x / RBC: x / WBC x   Sq Epi: x / Non Sq Epi: x / Bacteria: x        Venous Blood Gas:  01-29 @ 02:30  7.21/34/54/14/89.4  VBG Lactate: 1.0  Venous Blood Gas:  01-28 @ 22:20  7.19/32/28/12/58.8  VBG Lactate: 1.0  Venous Blood Gas:  01-28 @ 17:58  7.12/26/34/--/69.1  VBG Lactate: 1.2  Venous Blood Gas:  01-28 @ 14:42  6.98/26/32/6/59.7  VBG Lactate: 1.5  Venous Blood Gas:  01-28 @ 13:32  6.96/29/29/6/55.6  VBG Lactate: 1.7      MICROBIOLOGY:     Urinalysis with Rflx Culture (collected 28 Jan 2025 15:30)        RADIOLOGY:  [ ] Reviewed by me   ****Tania Grizabella Internal Medicine PGY-1*****    Overnight Events: no acute overnight events  Interval Events: pt seen and examined at bedside    OBJECTIVE:  ICU Vital Signs Last 24 Hrs  T(C): 36.4 (29 Jan 2025 04:00), Max: 36.6 (28 Jan 2025 21:48)  T(F): 97.5 (29 Jan 2025 04:00), Max: 97.8 (28 Jan 2025 21:48)  HR: 53 (29 Jan 2025 07:00) (22 - 73)  BP: 102/61 (29 Jan 2025 07:00) (93/57 - 118/64)  BP(mean): 74 (29 Jan 2025 07:00) (63 - 83)  ABP: --  ABP(mean): --  RR: 12 (29 Jan 2025 07:00) (11 - 55)  SpO2: 98% (29 Jan 2025 07:00) (96% - 100%)    O2 Parameters below as of 29 Jan 2025 07:00  Patient On (Oxygen Delivery Method): room air              01-28 @ 07:01  -  01-29 @ 07:00  --------------------------------------------------------  IN: 1723 mL / OUT: 800 mL / NET: 923 mL      CAPILLARY BLOOD GLUCOSE      POCT Blood Glucose.: 199 mg/dL (29 Jan 2025 07:03)      PHYSICAL EXAM  GEN: NAD. A&Ox3. Non-toxic appearing.  HEENT: normocephalic, atraumatic, EOMI, PERRL, no scleral icterus, no conjunctival pallor, moist MM  CARDIAC: RRR, S1, S2, no murmur. Radial pulses present and symmetric b/l  PULM: CTA B/L no wheeze, rhonchi, rales.   ABD: soft NT, ND, no rebound no guarding  MSK: Moving all extremities, no edema. 5/5 strength and full ROM in all extremities.     NEURO: No focal neurological deficits, CN 2-12 grossly intact  SKIN: warm, dry, no rash.      HOSPITAL MEDICATIONS:  MEDICATIONS  (STANDING):  chlorhexidine 2% Cloths 1 Application(s) Topical <User Schedule>  dextrose 5% + lactated ringers 1000 milliLiter(s) (150 mL/Hr) IV Continuous <Continuous>  dextrose 50% Injectable 25 Gram(s) IV Push once  dextrose 50% Injectable 12.5 Gram(s) IV Push once  enoxaparin Injectable 40 milliGRAM(s) SubCutaneous every 24 hours  influenza   Vaccine 0.5 milliLiter(s) IntraMuscular once  insulin regular Infusion 2 Unit(s)/Hr (2 mL/Hr) IV Continuous <Continuous>  oseltamivir 75 milliGRAM(s) Oral two times a day    MEDICATIONS  (PRN):      LABS:                        12.7   2.61  )-----------( 93       ( 29 Jan 2025 02:30 )             36.3     Hgb Trend: 12.7<--, 13.7<--, 15.6<--  01-29    133[L]  |  112[H]  |  10  ----------------------------<  181[H]  3.8   |  12[L]  |  0.63    Ca    7.3[L]      29 Jan 2025 02:30  Phos  3.1     01-29  Mg     2.10     01-29    TPro  5.5[L]  /  Alb  3.0[L]  /  TBili  0.3  /  DBili  x   /  AST  17  /  ALT  7   /  AlkPhos  33[L]  01-29    Creatinine Trend: 0.63<--, 0.72<--, 0.78<--, 0.85<--  PT/INR - ( 29 Jan 2025 02:30 )   PT: 12.6 sec;   INR: 1.09 ratio         PTT - ( 29 Jan 2025 02:30 )  PTT:30.3 sec  Urinalysis Basic - ( 29 Jan 2025 02:30 )    Color: x / Appearance: x / SG: x / pH: x  Gluc: 181 mg/dL / Ketone: x  / Bili: x / Urobili: x   Blood: x / Protein: x / Nitrite: x   Leuk Esterase: x / RBC: x / WBC x   Sq Epi: x / Non Sq Epi: x / Bacteria: x        Venous Blood Gas:  01-29 @ 02:30  7.21/34/54/14/89.4  VBG Lactate: 1.0  Venous Blood Gas:  01-28 @ 22:20  7.19/32/28/12/58.8  VBG Lactate: 1.0  Venous Blood Gas:  01-28 @ 17:58  7.12/26/34/--/69.1  VBG Lactate: 1.2  Venous Blood Gas:  01-28 @ 14:42  6.98/26/32/6/59.7  VBG Lactate: 1.5  Venous Blood Gas:  01-28 @ 13:32  6.96/29/29/6/55.6  VBG Lactate: 1.7      MICROBIOLOGY:     Urinalysis with Rflx Culture (collected 28 Jan 2025 15:30)        RADIOLOGY:  PROCEDURE:  CT Angiography of the Chest.  Sagittal and coronal reformats were performed as well as 3D (MIP)   reconstructions.    FINDINGS:    LUNGS AND LARGE AIRWAYS: Patent central airways. Scattered subsegmental   atelectasis in the right middle and lower lobes. No consolidation.  PLEURA: No pleural effusion.  VESSELS: Patent Jordan anastomosis of the SVC to the pulmonary artery.   Fontan anastomosis of the IVC to the pulmonary artery is opacified.   Asymmetric flow to the right pulmonary arteries. Left pulmonary arteries   are poorly opacified. Heterogeneous filling within the right main   pulmonary artery is likely related to mixing. Distally, however there are   numerous filling defects within multiple segmental pulmonary artery   branches throughout all three right lung lobes.  HEART: Dextrocardia. Ventricular septal defect.  MEDIASTINUM AND ZULMA: No lymphadenopathy.  CHEST WALL AND LOWER NECK: Sternotomy.  VISUALIZED UPPER ABDOMEN: Within normal limits.  BONES: Degenerative changes.    IMPRESSION:  Multiple findings related to congenital cardiac anomalies with   dextrocardia have been described in detail on the recent prior MRI of the   chest.    Asymmetric preferential perfusion to the right lung with multiple   scattered filling defects throughout numerous segmental pulmonary artery   branches throughout the right lung, which may be artifactual and/or   related to the mixing due to the underlying cardiac abnormalities;   therefore this exam may not be reliable to reliably evaluate for   pulmonary emboli; if there is strong clinical suspicion for emboli; a V/Q   scan can be considered.    Heterogeneous filling within the right main and lobar pulmonary arteries   may be related to mixing artifact.    The left pulmonary veins as well as the arteries are not opacified   therefore limited evaluation.   ****Tania Vicenta Internal Medicine PGY-1*****    Overnight Events: no acute overnight events  Interval Events: pt seen and examined at bedside. Pt doing okay overall, just feeling very tired. Denies abdominal pain, CP/SOB, headache, constipation/diarrhea, fevers/chills.     OBJECTIVE:  ICU Vital Signs Last 24 Hrs  T(C): 36.4 (29 Jan 2025 04:00), Max: 36.6 (28 Jan 2025 21:48)  T(F): 97.5 (29 Jan 2025 04:00), Max: 97.8 (28 Jan 2025 21:48)  HR: 53 (29 Jan 2025 07:00) (22 - 73)  BP: 102/61 (29 Jan 2025 07:00) (93/57 - 118/64)  BP(mean): 74 (29 Jan 2025 07:00) (63 - 83)  ABP: --  ABP(mean): --  RR: 12 (29 Jan 2025 07:00) (11 - 55)  SpO2: 98% (29 Jan 2025 07:00) (96% - 100%)    O2 Parameters below as of 29 Jan 2025 07:00  Patient On (Oxygen Delivery Method): room air              01-28 @ 07:01  -  01-29 @ 07:00  --------------------------------------------------------  IN: 1723 mL / OUT: 800 mL / NET: 923 mL      CAPILLARY BLOOD GLUCOSE      POCT Blood Glucose.: 199 mg/dL (29 Jan 2025 07:03)      PHYSICAL EXAM  GEN: NAD. A&Ox3. Non-toxic appearing.  HEENT: normocephalic, atraumatic, EOMI, PERRL, no scleral icterus, no conjunctival pallor, moist MM  CARDIAC: RRR, S1, S2, no murmur. Radial pulses present and symmetric b/l  PULM: CTA B/L no wheeze, rhonchi, rales.   ABD: soft NT, ND, no rebound no guarding  MSK: Moving all extremities, no edema. 5/5 strength and full ROM in all extremities.     NEURO: No focal neurological deficits, CN 2-12 grossly intact  SKIN: warm, dry, no rash.      HOSPITAL MEDICATIONS:  MEDICATIONS  (STANDING):  chlorhexidine 2% Cloths 1 Application(s) Topical <User Schedule>  dextrose 5% + lactated ringers 1000 milliLiter(s) (150 mL/Hr) IV Continuous <Continuous>  dextrose 50% Injectable 25 Gram(s) IV Push once  dextrose 50% Injectable 12.5 Gram(s) IV Push once  enoxaparin Injectable 40 milliGRAM(s) SubCutaneous every 24 hours  influenza   Vaccine 0.5 milliLiter(s) IntraMuscular once  insulin regular Infusion 2 Unit(s)/Hr (2 mL/Hr) IV Continuous <Continuous>  oseltamivir 75 milliGRAM(s) Oral two times a day    MEDICATIONS  (PRN):      LABS:                        12.7   2.61  )-----------( 93       ( 29 Jan 2025 02:30 )             36.3     Hgb Trend: 12.7<--, 13.7<--, 15.6<--  01-29    133[L]  |  112[H]  |  10  ----------------------------<  181[H]  3.8   |  12[L]  |  0.63    Ca    7.3[L]      29 Jan 2025 02:30  Phos  3.1     01-29  Mg     2.10     01-29    TPro  5.5[L]  /  Alb  3.0[L]  /  TBili  0.3  /  DBili  x   /  AST  17  /  ALT  7   /  AlkPhos  33[L]  01-29    Creatinine Trend: 0.63<--, 0.72<--, 0.78<--, 0.85<--  PT/INR - ( 29 Jan 2025 02:30 )   PT: 12.6 sec;   INR: 1.09 ratio         PTT - ( 29 Jan 2025 02:30 )  PTT:30.3 sec  Urinalysis Basic - ( 29 Jan 2025 02:30 )    Color: x / Appearance: x / SG: x / pH: x  Gluc: 181 mg/dL / Ketone: x  / Bili: x / Urobili: x   Blood: x / Protein: x / Nitrite: x   Leuk Esterase: x / RBC: x / WBC x   Sq Epi: x / Non Sq Epi: x / Bacteria: x        Venous Blood Gas:  01-29 @ 02:30  7.21/34/54/14/89.4  VBG Lactate: 1.0  Venous Blood Gas:  01-28 @ 22:20  7.19/32/28/12/58.8  VBG Lactate: 1.0  Venous Blood Gas:  01-28 @ 17:58  7.12/26/34/--/69.1  VBG Lactate: 1.2  Venous Blood Gas:  01-28 @ 14:42  6.98/26/32/6/59.7  VBG Lactate: 1.5  Venous Blood Gas:  01-28 @ 13:32  6.96/29/29/6/55.6  VBG Lactate: 1.7      MICROBIOLOGY:     Urinalysis with Rflx Culture (collected 28 Jan 2025 15:30)        RADIOLOGY:  PROCEDURE:  CT Angiography of the Chest.  Sagittal and coronal reformats were performed as well as 3D (MIP)   reconstructions.    FINDINGS:    LUNGS AND LARGE AIRWAYS: Patent central airways. Scattered subsegmental   atelectasis in the right middle and lower lobes. No consolidation.  PLEURA: No pleural effusion.  VESSELS: Patent Jordan anastomosis of the SVC to the pulmonary artery.   Fontan anastomosis of the IVC to the pulmonary artery is opacified.   Asymmetric flow to the right pulmonary arteries. Left pulmonary arteries   are poorly opacified. Heterogeneous filling within the right main   pulmonary artery is likely related to mixing. Distally, however there are   numerous filling defects within multiple segmental pulmonary artery   branches throughout all three right lung lobes.  HEART: Dextrocardia. Ventricular septal defect.  MEDIASTINUM AND ZULMA: No lymphadenopathy.  CHEST WALL AND LOWER NECK: Sternotomy.  VISUALIZED UPPER ABDOMEN: Within normal limits.  BONES: Degenerative changes.    IMPRESSION:  Multiple findings related to congenital cardiac anomalies with   dextrocardia have been described in detail on the recent prior MRI of the   chest.    Asymmetric preferential perfusion to the right lung with multiple   scattered filling defects throughout numerous segmental pulmonary artery   branches throughout the right lung, which may be artifactual and/or   related to the mixing due to the underlying cardiac abnormalities;   therefore this exam may not be reliable to reliably evaluate for   pulmonary emboli; if there is strong clinical suspicion for emboli; a V/Q   scan can be considered.    Heterogeneous filling within the right main and lobar pulmonary arteries   may be related to mixing artifact.    The left pulmonary veins as well as the arteries are not opacified   therefore limited evaluation.

## 2025-01-29 NOTE — CONSULT NOTE ADULT - ATTENDING COMMENTS
The patient is a 34y Female with PMH of DVT and dextrocardia/VSD, presenting with weight loss, polyuria, and polydipsia and found to have new-onset DM c/b severe DKA. Endocrinology consulted for uncontrolled DM complicated by DKA.    Patient is clinically stable and remains on relatively low rate insulin gtt, okay to transition with above basal bolus regimen. Patient to be discharged on basal bolus insulin regimen, given initial presentation of DKA. Ab's sent and to follow up as outpatient setting for etiology of diabetes.

## 2025-01-29 NOTE — CONSULT NOTE ADULT - ASSESSMENT
The patient is a 34y Female with PMH of DVT and dextrocardia/VSD, presenting with weight loss, polyuria, and polydipsia and found to have new-onset DM c/b severe DKA. Endocrinology consulted for uncontrolled DM complicated by DKA.    #New-Onset Uncontrolled Diabetes Mellitus   #Diabetic Ketoacidosis  - A1C with Estimated Average Glucose Result: 14.4 % (01-28-25). Previous A1c 5.4% in 2022.  - eGFR: 119 mL/min/1.73m2 (01-29-25)  - Weight (kg): 59.1 (01-28-25)  - initial labs with c/w severe DKA-> now improving s/p insulin gtt  - suspect T1DM given patient body habitus, young age, relatively rapid-onset, and family hx of T1DM/autoimmune disease       INPATIENT PLAN:  - C/w insulin gtt until DKA resolved (bicarb >18, pH >7.3, and BHB <2.5). Once ready to transition, would base subq insulin doses on insulin gtt rates. Below is a suggestion based on current data:       - Recommend Lantus *** units q24 hours. Can stop insulin gtt 2 hours after Lantus is given.       - Recommend Admelog *** units TID before meals (HOLD if NPO or not eating)       - Recommend Low dose admelog correction scale TIDQAC and separate low dose scale QHS  - Please check FSG before meals and QHS, or q6h while NPO  - Inpatient glucose goals: 100-180  - consistent carb diet when eating  - nutrition consult placed  - check Glutamic Acid Decarboxylase antibody, Zinc Transporter 8 Ab, and Islet Antigen (IA-2) Antibody  - once patient is more stable and less glucotoxic will check c-peptide (send with [facility] for glucose)  - check CT A/P w/ IVC to rule out pancreatic tumor causing new-onset diabetes      DISCHARGE PLANNING:  - Discharge recs pending clinical course and workup above: will require basal/bolus insulin (doses TBD)  - will need RN insulin/glucometer teaching - ordered  - will need Endocrinology follow up. Please provide clinic info in DC paperwork for patient to make an appointment: [facility]: [address] [place] [address]. [address]. [place] [address]. Tel: [phone]    #Hypocalcemia  - corrected calcium mildly low today (8.3), likely dilutional  - trend CMP daily    Kayode Cruz MD  Endocrine Fellow  For nonurgent matters (including consults or followup questions), please email [email or web address] or [email or web address]. For urgent matters, please call answering service at [phone] (weekdays), [phone] (nights/weekends).    The patient is a 34y Female with PMH of DVT and dextrocardia/VSD, presenting with weight loss, polyuria, and polydipsia and found to have new-onset DM c/b severe DKA. Endocrinology consulted for uncontrolled DM complicated by DKA.    #New-Onset Uncontrolled Diabetes Mellitus   #Diabetic Ketoacidosis  - A1C with Estimated Average Glucose Result: 14.4 % (01-28-25). Previous A1c 5.4% in 2022.  - eGFR: 119 mL/min/1.73m2 (01-29-25)  - Weight (kg): 59.1 (01-28-25)  - initial labs with c/w severe DKA-> now improving s/p insulin gtt  - suspect T1DM given patient body habitus, young age, relatively rapid-onset, and family hx of T1DM/autoimmune disease       INPATIENT PLAN:  - C/w insulin gtt until DKA resolved (bicarb >18, pH >7.3, and BHB <2.5). Once ready to transition, would base subq insulin doses on insulin gtt rates. Below is a suggestion based on current data:       - Recommend Lantus 18 units q24 hours. Can stop insulin gtt 2 hours after Lantus is given.       - Recommend Admelog 5 units TID before meals (HOLD if NPO or not eating)       - Recommend Low dose admelog correction scale TIDQAC and separate low dose scale QHS  - Please check FSG before meals and QHS, or q6h while NPO  - Inpatient glucose goals: 100-180  - consistent carb diet when eating  - nutrition consult placed  - check Glutamic Acid Decarboxylase antibody, Zinc Transporter 8 Ab, and Islet Antigen (IA-2) Antibody  - once patient is more stable and less glucotoxic will check c-peptide (send with [facility] for glucose)  - will consider CT A/P w/ IVC to rule out pancreatic tumor causing new-onset diabetes - hold off for now      DISCHARGE PLANNING:  - Discharge recs pending clinical course and workup above: will require basal/bolus insulin (doses TBD)  - will need RN insulin/glucometer teaching - ordered  - will need Endocrinology follow up. Please provide clinic info in DC paperwork for patient to make an appointment: Endocrinology [facility]: [address] [place] [address]. [address]. [place] [address]. Tel: [phone]    #Hypocalcemia  - corrected calcium mildly low today (8.3), likely dilutional  - trend CMP daily    Kayode Cruz MD  Endocrine Fellow  For nonurgent matters (including consults or followup questions), please email [email or web address] or [email or web address]. For urgent matters, please call answering service at [phone] (weekdays), [phone] (nights/weekends).

## 2025-01-29 NOTE — PROGRESS NOTE ADULT - ASSESSMENT
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)  - No active issues, sees pediatric cardiology (last seen 1/17/23)      ======RESPIRATORY=====  - No active issues, pt on RA      =====GI/NUTRITION=====  - Diet: NPO while on insulin gtt      =====/RENAL=====  - No active issues      =====INFECTIOUS DISEASE=====  #Influenza A  - Start oseltamivir 75 BID 5 days (1/28-2/1)      =====ENDOCRINE=====  #DKA  #Suspect COLLEEN  - Pt not on any medications at home, no previous diagnosis of DM  - High anion gap metabolic acidosis with elevated BHB c/w DKA  - A1C 14.4  - Start DKA protocol  - Insulin gtt  - D5 LR   - BMP q4h  - endocrinology consult  - will check COLLEEN labs; zinc transporter antibody, glutamic acid decarboxylase antibody, Islet antigen (IA-2) antibody      =====HEMATOLOGIC/DVT PPX=====  - No active issues    - DVT PPX: Lovenox       =====ETHICS=====  - Code status: full code  - Dispo: remain in MICU

## 2025-01-29 NOTE — CONSULT NOTE ADULT - NS ATTEND AMEND GEN_ALL_CORE FT
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     Diagnosed with Flu A and DKA. She was admitted to ICU for IV fluids and insulin gtt. CTA chest indeterminate for PE, however, not timed correctly  with scattered filling defects throughout the pulmonary arteries.      Currently, symptoms improving on currently therapies.     O/E: Gen: mildly ill appearing, CVS S1S2, RRR, dextrocardia, sternotomy scar well healed, Lungs: CTA, Extrem: no edema    Labs, EKG and tele reviewed.  Echo images reviewed, final report pending.     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     Diagnosed with Flu A and DKA. She was admitted to ICU for IV fluids and insulin gtt. CTA chest indeterminate for PE, however, contrast not timed correctly     Currently, symptoms improving on currently therapies.     O/E: Gen: mildly ill appearing, CVS S1S2, RRR, dextrocardia, sternotomy scar well healed, Lungs: CTA, Extrem: no edema    Labs, EKG and tele reviewed.  Echo images reviewed, final report pending.     A/P  #Dextrocardia with DORV, VSD, straddling AV valve, L-transposed great arteries, Ventricular inversion s/p bidirectional Jordan and non-fenestrated lateral Fontan  - echo pending   - cMRI as outpt    # Atrial arrhythmias s/p ablation and h/o embolism  - restart eliquis   - c/w tele  - repeat EKG    #DKA  - management as per ICU  - keep K>4, Mg >2    #flu  - management as per ICU    discussed with ICU team

## 2025-01-29 NOTE — CONSULT NOTE ADULT - ASSESSMENT
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 Nemesio hasn't been followed by [facility] since 01/2023 because she just hasn't been able to make an appointment. Prior to this admission she was doing well from a cardiac perspective, exercising multiple times a week with no symptoms. She also has not taken Eliquis in over a year, but had no particular reason for discontinuing it. Starting a week or two ago she noticed she was having increased thirst, increased urinary frequency, skin flushing/itchiness, and fatigue. Then a few days ago she also began experiencing increased shortness of breath with exertion, so she came to the ED to be evaluated. She was found upon arrival to be Flu A positive, and in DKA with pH 6.96, bicarb <7, anion gap 23, BG 300s, beta hydroxybutyrate 8.1. She is currently in the ICU on IV fluids and insulin gtt. The ED also performed a CTA chest to r/o PE, however it was not timed correctly for her congenital heart disease, and had scattered filling defects throughout the pulmonary arteries. She is feeling better today, saturating well on room air. Nemesio also does note she recently had a 5 hour drive, where she did not get up and move around much. Nemesio denies any active fevers, chills, CP, palpitations, syncope, near syncope, leg swelling, abdominal pain, N/V/D, headache, or dizziness.     1. Dextrocardia with DORV, VSD, straddling AV valve, L-transposed great arteries, Ventricular inversion s/p bidirectional Jordan and non-fenestrated lateral Fontan  - CT scan with some filling defects, but not timed correctly in setting of congenital heart disease.   - Patient with hx of hypercoagulability with spinal embolism.   - Restart full dose AC as patient is supposed to be on Eliquis at home.   - Check LE venous duplex to r/o DVT.   - Obtain pediatric Echocardiogram.   - Euvolemic on exam.   - Continue telemetry monitoring.   - Will need further management for [facility] as an outpatient.

## 2025-01-30 ENCOUNTER — RESULT REVIEW (OUTPATIENT)
Age: 35
End: 2025-01-30

## 2025-01-30 LAB
ALBUMIN SERPL ELPH-MCNC: 2.9 G/DL — LOW (ref 3.3–5)
ALP SERPL-CCNC: 32 U/L — LOW (ref 40–120)
ALT FLD-CCNC: 12 U/L — SIGNIFICANT CHANGE UP (ref 4–33)
ANION GAP SERPL CALC-SCNC: 10 MMOL/L — SIGNIFICANT CHANGE UP (ref 7–14)
APTT BLD: 28.8 SEC — SIGNIFICANT CHANGE UP (ref 24.5–35.6)
AST SERPL-CCNC: 26 U/L — SIGNIFICANT CHANGE UP (ref 4–32)
B PERT DNA SPEC QL NAA+PROBE: SIGNIFICANT CHANGE UP
B PERT+PARAPERT DNA PNL SPEC NAA+PROBE: SIGNIFICANT CHANGE UP
BASOPHILS # BLD AUTO: 0.01 K/UL — SIGNIFICANT CHANGE UP (ref 0–0.2)
BASOPHILS NFR BLD AUTO: 0.4 % — SIGNIFICANT CHANGE UP (ref 0–2)
BILIRUB SERPL-MCNC: 0.4 MG/DL — SIGNIFICANT CHANGE UP (ref 0.2–1.2)
BUN SERPL-MCNC: 9 MG/DL — SIGNIFICANT CHANGE UP (ref 7–23)
C PEPTIDE SERPL-MCNC: 0.8 NG/ML — LOW (ref 1.1–4.4)
C PNEUM DNA SPEC QL NAA+PROBE: SIGNIFICANT CHANGE UP
CA-I BLD-SCNC: 1.06 MMOL/L — LOW (ref 1.15–1.29)
CALCIUM SERPL-MCNC: 7.4 MG/DL — LOW (ref 8.4–10.5)
CHLORIDE SERPL-SCNC: 108 MMOL/L — HIGH (ref 98–107)
CO2 SERPL-SCNC: 15 MMOL/L — LOW (ref 22–31)
CREAT SERPL-MCNC: 0.61 MG/DL — SIGNIFICANT CHANGE UP (ref 0.5–1.3)
EGFR: 119 ML/MIN/1.73M2 — SIGNIFICANT CHANGE UP
EOSINOPHIL # BLD AUTO: 0.06 K/UL — SIGNIFICANT CHANGE UP (ref 0–0.5)
EOSINOPHIL NFR BLD AUTO: 2.6 % — SIGNIFICANT CHANGE UP (ref 0–6)
FLUAV H3 RNA SPEC QL NAA+PROBE: DETECTED
FLUBV RNA SPEC QL NAA+PROBE: SIGNIFICANT CHANGE UP
GAS PNL BLDV: SIGNIFICANT CHANGE UP
GLUCOSE BLDC GLUCOMTR-MCNC: 156 MG/DL — HIGH (ref 70–99)
GLUCOSE BLDC GLUCOMTR-MCNC: 189 MG/DL — HIGH (ref 70–99)
GLUCOSE BLDC GLUCOMTR-MCNC: 256 MG/DL — HIGH (ref 70–99)
GLUCOSE BLDC GLUCOMTR-MCNC: 273 MG/DL — HIGH (ref 70–99)
GLUCOSE SERPL-MCNC: 180 MG/DL — HIGH (ref 70–99)
GLUCOSE SERPL-MCNC: 200 MG/DL — HIGH (ref 70–99)
HADV DNA SPEC QL NAA+PROBE: SIGNIFICANT CHANGE UP
HCOV 229E RNA SPEC QL NAA+PROBE: SIGNIFICANT CHANGE UP
HCOV HKU1 RNA SPEC QL NAA+PROBE: SIGNIFICANT CHANGE UP
HCOV NL63 RNA SPEC QL NAA+PROBE: SIGNIFICANT CHANGE UP
HCOV OC43 RNA SPEC QL NAA+PROBE: SIGNIFICANT CHANGE UP
HCT VFR BLD CALC: 37.7 % — SIGNIFICANT CHANGE UP (ref 34.5–45)
HGB BLD-MCNC: 13.1 G/DL — SIGNIFICANT CHANGE UP (ref 11.5–15.5)
HMPV RNA SPEC QL NAA+PROBE: SIGNIFICANT CHANGE UP
HPIV1 RNA SPEC QL NAA+PROBE: SIGNIFICANT CHANGE UP
HPIV2 RNA SPEC QL NAA+PROBE: SIGNIFICANT CHANGE UP
HPIV3 RNA SPEC QL NAA+PROBE: SIGNIFICANT CHANGE UP
HPIV4 RNA SPEC QL NAA+PROBE: SIGNIFICANT CHANGE UP
IANC: 1.14 K/UL — LOW (ref 1.8–7.4)
IMM GRANULOCYTES NFR BLD AUTO: 0.9 % — SIGNIFICANT CHANGE UP (ref 0–0.9)
INR BLD: 1.04 RATIO — SIGNIFICANT CHANGE UP (ref 0.85–1.16)
LYMPHOCYTES # BLD AUTO: 0.81 K/UL — LOW (ref 1–3.3)
LYMPHOCYTES # BLD AUTO: 35.1 % — SIGNIFICANT CHANGE UP (ref 13–44)
M PNEUMO DNA SPEC QL NAA+PROBE: SIGNIFICANT CHANGE UP
MAGNESIUM SERPL-MCNC: 2.4 MG/DL — SIGNIFICANT CHANGE UP (ref 1.6–2.6)
MCHC RBC-ENTMCNC: 30.1 PG — SIGNIFICANT CHANGE UP (ref 27–34)
MCHC RBC-ENTMCNC: 34.7 G/DL — SIGNIFICANT CHANGE UP (ref 32–36)
MCV RBC AUTO: 86.7 FL — SIGNIFICANT CHANGE UP (ref 80–100)
MONOCYTES # BLD AUTO: 0.27 K/UL — SIGNIFICANT CHANGE UP (ref 0–0.9)
MONOCYTES NFR BLD AUTO: 11.7 % — SIGNIFICANT CHANGE UP (ref 2–14)
NEUTROPHILS # BLD AUTO: 1.14 K/UL — LOW (ref 1.8–7.4)
NEUTROPHILS NFR BLD AUTO: 49.3 % — SIGNIFICANT CHANGE UP (ref 43–77)
NRBC # BLD AUTO: 0 K/UL — SIGNIFICANT CHANGE UP (ref 0–0)
NRBC # BLD: 0 /100 WBCS — SIGNIFICANT CHANGE UP (ref 0–0)
NRBC # FLD: 0 K/UL — SIGNIFICANT CHANGE UP (ref 0–0)
NRBC BLD-RTO: 0 /100 WBCS — SIGNIFICANT CHANGE UP (ref 0–0)
PHOSPHATE SERPL-MCNC: 3.1 MG/DL — SIGNIFICANT CHANGE UP (ref 2.5–4.5)
PLATELET # BLD AUTO: 90 K/UL — LOW (ref 150–400)
POTASSIUM SERPL-MCNC: 4.1 MMOL/L — SIGNIFICANT CHANGE UP (ref 3.5–5.3)
POTASSIUM SERPL-SCNC: 4.1 MMOL/L — SIGNIFICANT CHANGE UP (ref 3.5–5.3)
PROT SERPL-MCNC: 5.3 G/DL — LOW (ref 6–8.3)
PROTHROM AB SERPL-ACNC: 12.4 SEC — SIGNIFICANT CHANGE UP (ref 9.9–13.4)
RAPID RVP RESULT: DETECTED
RBC # BLD: 4.35 M/UL — SIGNIFICANT CHANGE UP (ref 3.8–5.2)
RBC # FLD: 14.5 % — SIGNIFICANT CHANGE UP (ref 10.3–14.5)
RSV RNA SPEC QL NAA+PROBE: SIGNIFICANT CHANGE UP
RV+EV RNA SPEC QL NAA+PROBE: SIGNIFICANT CHANGE UP
SARS-COV-2 RNA SPEC QL NAA+PROBE: SIGNIFICANT CHANGE UP
SODIUM SERPL-SCNC: 133 MMOL/L — LOW (ref 135–145)
WBC # BLD: 2.31 K/UL — LOW (ref 3.8–10.5)
WBC # FLD AUTO: 2.31 K/UL — LOW (ref 3.8–10.5)

## 2025-01-30 PROCEDURE — 93970 EXTREMITY STUDY: CPT | Mod: 26

## 2025-01-30 PROCEDURE — 99232 SBSQ HOSP IP/OBS MODERATE 35: CPT

## 2025-01-30 PROCEDURE — 99233 SBSQ HOSP IP/OBS HIGH 50: CPT | Mod: GC

## 2025-01-30 RX ORDER — INSULIN LISPRO 100/ML
6 VIAL (ML) SUBCUTANEOUS
Refills: 0 | Status: DISCONTINUED | OUTPATIENT
Start: 2025-01-30 | End: 2025-01-31

## 2025-01-30 RX ADMIN — OSELTAMIVIR PHOSPHATE 75 MILLIGRAM(S): 75 CAPSULE ORAL at 06:11

## 2025-01-30 RX ADMIN — Medication 1: at 07:53

## 2025-01-30 RX ADMIN — APIXABAN 5 MILLIGRAM(S): 5 TABLET, FILM COATED ORAL at 17:34

## 2025-01-30 RX ADMIN — Medication 3: at 11:54

## 2025-01-30 RX ADMIN — ANTISEPTIC SURGICAL SCRUB 1 APPLICATION(S): 0.04 SOLUTION TOPICAL at 06:11

## 2025-01-30 RX ADMIN — Medication 1: at 21:58

## 2025-01-30 RX ADMIN — INSULIN GLARGINE-YFGN 18 UNIT(S): 100 INJECTION, SOLUTION SUBCUTANEOUS at 22:00

## 2025-01-30 RX ADMIN — Medication 6 UNIT(S): at 17:27

## 2025-01-30 RX ADMIN — Medication 5 UNIT(S): at 11:53

## 2025-01-30 RX ADMIN — APIXABAN 5 MILLIGRAM(S): 5 TABLET, FILM COATED ORAL at 06:10

## 2025-01-30 RX ADMIN — Medication 5 UNIT(S): at 07:53

## 2025-01-30 RX ADMIN — Medication 1: at 17:28

## 2025-01-30 RX ADMIN — OSELTAMIVIR PHOSPHATE 75 MILLIGRAM(S): 75 CAPSULE ORAL at 17:34

## 2025-01-30 NOTE — PROGRESS NOTE ADULT - SUBJECTIVE AND OBJECTIVE BOX
PROGRESS NOTE    Reason for follow up: [facility]  Update: Patient's echocardiogram was performed that showed normal biventricular function and with unobstructed Jordan and Fontan, but no able to fully visualize. She is feeling better today, and is going to be downgraded from the ICU.     Review of symptoms:   Cardiac:  No chest pain. No dyspnea. No palpitations.  Respiratory: no cough. No dyspnea  Gastrointestinal: No diarrhea. No abdominal pain. No bleeding.   Neuro: No focal neuro complaints.    Vitals:  T(C): 36.1 (01-30-25 @ 08:00), Max: 36.3 (01-30-25 @ 04:00)  HR: 63 (01-30-25 @ 12:00) (51 - 70)  BP: 117/80 (01-30-25 @ 12:00) (94/62 - 126/78)  RR: 16 (01-30-25 @ 12:00) (11 - 19)  SpO2: 91% (01-30-25 @ 12:00) (91% - 99%)  Wt(kg): --  I&O's Summary    29 Jan 2025 07:01  -  30 Jan 2025 07:00  --------------------------------------------------------  IN: 2427.4 mL / OUT: 2000 mL / NET: 427.4 mL    30 Jan 2025 07:01  -  30 Jan 2025 13:24  --------------------------------------------------------  IN: 600 mL / OUT: 0 mL / NET: 600 mL      Weight (kg): 59.1 (01-28 @ 23:00)    PHYSICAL EXAM:  General - non-dysmorphic, well-developed.  Skin - no rash, no cyanosis.  Eyes / ENT - external appearance of eyes, ears, & nares normal.  Pulmonary - normal inspiratory effort, no retractions, lungs clear bilaterally, no wheezes, no rales.  Cardiovascular - normal rate, regular rhythm, normal S1 & S2, III/VI systolic murmur RLSB, no rubs, no gallops, capillary refill < 2sec, normal pulses.  Gastrointestinal - soft, no hepatomegaly.  Musculoskeletal - no clubbing, no edema.  Neurologic / Psychiatric - moves all extremities, normal tone.    CURRENT CARDIAC MEDICATIONS:      CURRENT OTHER MEDICATIONS:  oseltamivir 75 milliGRAM(s) Oral two times a day  dextrose 50% Injectable 25 Gram(s) IV Push once, Stop order after: 1 Doses  dextrose 50% Injectable 12.5 Gram(s) IV Push once, Stop order after: 1 Doses  dextrose Oral Gel 15 Gram(s) Oral once, Stop order after: 1 Doses  glucagon  Injectable 1 milliGRAM(s) IntraMuscular once, Stop order after: 1 Doses  insulin glargine Injectable (LANTUS) 18 Unit(s) SubCutaneous at bedtime  insulin lispro (ADMELOG) corrective regimen sliding scale   SubCutaneous three times a day before meals  insulin lispro (ADMELOG) corrective regimen sliding scale   SubCutaneous at bedtime  insulin lispro Injectable (ADMELOG) 5 Unit(s) SubCutaneous three times a day before meals  apixaban 5 milliGRAM(s) Oral two times a day, Stop order after: 90 Days  chlorhexidine 2% Cloths 1 Application(s) Topical <User Schedule>  dextrose 5%. 1000 milliLiter(s) (50 mL/Hr) IV Continuous <Continuous>  dextrose 5%. 1000 milliLiter(s) (100 mL/Hr) IV Continuous <Continuous>  influenza   Vaccine 0.5 milliLiter(s) IntraMuscular once      LABS:	 	                            13.1   2.31  )-----------( 90       ( 30 Jan 2025 01:43 )             37.7     01-30    133[L]  |  108[H]  |  9   ----------------------------<  180[H]  4.1   |  15[L]  |  0.61    Ca    7.4[L]      30 Jan 2025 01:43  Phos  3.1     01-30  Mg     2.40     01-30    TPro  5.3[L]  /  Alb  2.9[L]  /  TBili  0.4  /  DBili  x   /  AST  26  /  ALT  12  /  AlkPhos  32[L]  01-30    PT/INR/PTT ( 30 Jan 2025 01:43 )                       :                       :      12.4         :       28.8                  .        .                   .              .           .       1.04        .                                       Lipid Profile: Date: 01-28 @ 22:20  Total cholesterol 171; Direct LDL: --; HDL: 28; Triglycerides:109    HgA1c:   TSH: Thyroid Stimulating Hormone, Serum: 2.17 uIU/mL      TELEMETRY: SR, SB, junctional bradycardia   ECG:    DIAGNOSTIC TESTING:  [ ] Echocardiogram: 01/29/25  Summary:   1. History of dextrocardia, double outlet right ventricle, S,L,L with a large ventricular septal defect status post non-fenestrated lateral tunnel Fontan.   2. Status post lateral tunnel non-fenestrated Fontan baffle.   3. Mild mitral valve regurgitation.   4. Physiologic tricuspid valve regurgitation.   5. No systemic outflow obstruction with trivial aortic regurgitation.   6. Unobstructedright Jordan with laminar biphasic low velocity flow.   7. Unobstructed Fontan with laminar biphasic and low velocity flow; anastomosis to the branch pulmonary arteries could not be seen well.   8. Qualitatively normal right ventricular systolic function.   9. Qualitatively normal left ventricular systolic function.  10. No pericardial effusion.  11. No pleural effusion.    [ ]  Catheterization:  [ ] Stress Test:    OTHER: 	  < from: CT Angio Chest PE Protocol w/ IV Cont (01.28.25 @ 19:56) >  FINDINGS:    LUNGS AND LARGE AIRWAYS: Patent central airways. Scattered subsegmental   atelectasis in the right middle and lower lobes. No consolidation.  PLEURA: No pleural effusion.  VESSELS: Patent Jordan anastomosis of the SVC to the pulmonary artery.   Fontan anastomosis of the IVC to the pulmonary artery is opacified.   Asymmetric flow to the right pulmonary arteries. Left pulmonary arteries   are poorly opacified. Heterogeneous filling within the right main   pulmonary artery is likely related to mixing. Distally, however there are   numerous filling defects within multiple segmental pulmonary artery   branches throughout all three right lung lobes.  HEART: Dextrocardia. Ventricular septal defect.  MEDIASTINUM AND ZULMA: No lymphadenopathy.  CHEST WALL AND LOWER NECK: Sternotomy.  VISUALIZED UPPER ABDOMEN: Within normal limits.  BONES: Degenerative changes.    IMPRESSION:  Multiple findings related to congenital cardiac anomalies with   dextrocardia have been described in detail on the recent prior MRI of the   chest.    Asymmetric preferential perfusion to the right lung with multiple   scattered filling defects throughout numerous segmental pulmonary artery   branches throughout the right lung, which may be artifactual and/or   related to the mixing due to the underlying cardiac abnormalities;   therefore this exam may not be reliable to reliably evaluate for   pulmonary emboli; if there is strong clinical suspicion for emboli; a V/Q   scan can be considered.    Heterogeneous filling within the right main and lobar pulmonary arteries   may be related to mixing artifact.    The left pulmonary veins as well as the arteries are not opacified   therefore limited evaluation..    --- End of Report ---                                                                                                                                        PROGRESS NOTE    Reason for follow up: [facility]  Update: Patient's echocardiogram was performed that showed normal ventricular function and with unobstructed Jordan and Fontan, but no able to fully visualize. She is feeling better today, and is going to be downgraded from the ICU.     Review of symptoms:   Cardiac:  No chest pain. No dyspnea. No palpitations.  Respiratory: no cough. No dyspnea  Gastrointestinal: No diarrhea. No abdominal pain. No bleeding.   Neuro: No focal neuro complaints.    Vitals:  T(C): 36.1 (01-30-25 @ 08:00), Max: 36.3 (01-30-25 @ 04:00)  HR: 63 (01-30-25 @ 12:00) (51 - 70)  BP: 117/80 (01-30-25 @ 12:00) (94/62 - 126/78)  RR: 16 (01-30-25 @ 12:00) (11 - 19)  SpO2: 91% (01-30-25 @ 12:00) (91% - 99%)  Wt(kg): --  I&O's Summary    29 Jan 2025 07:01  -  30 Jan 2025 07:00  --------------------------------------------------------  IN: 2427.4 mL / OUT: 2000 mL / NET: 427.4 mL    30 Jan 2025 07:01  -  30 Jan 2025 13:24  --------------------------------------------------------  IN: 600 mL / OUT: 0 mL / NET: 600 mL      Weight (kg): 59.1 (01-28 @ 23:00)    PHYSICAL EXAM:  General - non-dysmorphic, well-developed.  Skin - no rash, no cyanosis.  Eyes / ENT - external appearance of eyes, ears, & nares normal.  Pulmonary - normal inspiratory effort, no retractions, lungs clear bilaterally, no wheezes, no rales.  Cardiovascular - normal rate, regular rhythm, normal S1 & S2, III/VI systolic murmur RLSB, no rubs, no gallops, capillary refill < 2sec, normal pulses.  Gastrointestinal - soft, no hepatomegaly.  Musculoskeletal - no clubbing, no edema.  Neurologic / Psychiatric - moves all extremities, normal tone.    CURRENT CARDIAC MEDICATIONS:      CURRENT OTHER MEDICATIONS:  oseltamivir 75 milliGRAM(s) Oral two times a day  dextrose 50% Injectable 25 Gram(s) IV Push once, Stop order after: 1 Doses  dextrose 50% Injectable 12.5 Gram(s) IV Push once, Stop order after: 1 Doses  dextrose Oral Gel 15 Gram(s) Oral once, Stop order after: 1 Doses  glucagon  Injectable 1 milliGRAM(s) IntraMuscular once, Stop order after: 1 Doses  insulin glargine Injectable (LANTUS) 18 Unit(s) SubCutaneous at bedtime  insulin lispro (ADMELOG) corrective regimen sliding scale   SubCutaneous three times a day before meals  insulin lispro (ADMELOG) corrective regimen sliding scale   SubCutaneous at bedtime  insulin lispro Injectable (ADMELOG) 5 Unit(s) SubCutaneous three times a day before meals  apixaban 5 milliGRAM(s) Oral two times a day, Stop order after: 90 Days  chlorhexidine 2% Cloths 1 Application(s) Topical <User Schedule>  dextrose 5%. 1000 milliLiter(s) (50 mL/Hr) IV Continuous <Continuous>  dextrose 5%. 1000 milliLiter(s) (100 mL/Hr) IV Continuous <Continuous>  influenza   Vaccine 0.5 milliLiter(s) IntraMuscular once      LABS:	 	                            13.1   2.31  )-----------( 90       ( 30 Jan 2025 01:43 )             37.7     01-30    133[L]  |  108[H]  |  9   ----------------------------<  180[H]  4.1   |  15[L]  |  0.61    Ca    7.4[L]      30 Jan 2025 01:43  Phos  3.1     01-30  Mg     2.40     01-30    TPro  5.3[L]  /  Alb  2.9[L]  /  TBili  0.4  /  DBili  x   /  AST  26  /  ALT  12  /  AlkPhos  32[L]  01-30    PT/INR/PTT ( 30 Jan 2025 01:43 )                       :                       :      12.4         :       28.8                  .        .                   .              .           .       1.04        .                                       Lipid Profile: Date: 01-28 @ 22:20  Total cholesterol 171; Direct LDL: --; HDL: 28; Triglycerides:109    HgA1c:   TSH: Thyroid Stimulating Hormone, Serum: 2.17 uIU/mL      TELEMETRY: SR, SB, junctional bradycardia   ECG:    DIAGNOSTIC TESTING:  [ ] Echocardiogram: 01/29/25  Summary:   1. History of dextrocardia, double outlet right ventricle, S,L,L with a large ventricular septal defect status post non-fenestrated lateral tunnel Fontan.   2. Status post lateral tunnel non-fenestrated Fontan baffle.   3. Mild mitral valve regurgitation.   4. Physiologic tricuspid valve regurgitation.   5. No systemic outflow obstruction with trivial aortic regurgitation.   6. Unobstructedright Jordan with laminar biphasic low velocity flow.   7. Unobstructed Fontan with laminar biphasic and low velocity flow; anastomosis to the branch pulmonary arteries could not be seen well.   8. Qualitatively normal right ventricular systolic function.   9. Qualitatively normal left ventricular systolic function.  10. No pericardial effusion.  11. No pleural effusion.    [ ]  Catheterization:  [ ] Stress Test:    OTHER: 	  < from: CT Angio Chest PE Protocol w/ IV Cont (01.28.25 @ 19:56) >  FINDINGS:    LUNGS AND LARGE AIRWAYS: Patent central airways. Scattered subsegmental   atelectasis in the right middle and lower lobes. No consolidation.  PLEURA: No pleural effusion.  VESSELS: Patent Jordan anastomosis of the SVC to the pulmonary artery.   Fontan anastomosis of the IVC to the pulmonary artery is opacified.   Asymmetric flow to the right pulmonary arteries. Left pulmonary arteries   are poorly opacified. Heterogeneous filling within the right main   pulmonary artery is likely related to mixing. Distally, however there are   numerous filling defects within multiple segmental pulmonary artery   branches throughout all three right lung lobes.  HEART: Dextrocardia. Ventricular septal defect.  MEDIASTINUM AND ZULMA: No lymphadenopathy.  CHEST WALL AND LOWER NECK: Sternotomy.  VISUALIZED UPPER ABDOMEN: Within normal limits.  BONES: Degenerative changes.    IMPRESSION:  Multiple findings related to congenital cardiac anomalies with   dextrocardia have been described in detail on the recent prior MRI of the   chest.    Asymmetric preferential perfusion to the right lung with multiple   scattered filling defects throughout numerous segmental pulmonary artery   branches throughout the right lung, which may be artifactual and/or   related to the mixing due to the underlying cardiac abnormalities;   therefore this exam may not be reliable to reliably evaluate for   pulmonary emboli; if there is strong clinical suspicion for emboli; a V/Q   scan can be considered.    Heterogeneous filling within the right main and lobar pulmonary arteries   may be related to mixing artifact.    The left pulmonary veins as well as the arteries are not opacified   therefore limited evaluation..    --- End of Report ---

## 2025-01-30 NOTE — CHART NOTE - NSCHARTNOTEFT_GEN_A_CORE
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 Pt not on any medications at home, no previous diagnosis of DM. High anion gap metabolic acidosis with elevated BHB c/w DKA, suspect COLLEEN.    MICU Course: Pt started on insulin gtt per DKA protocol, transitioned off drip 1/29. Monitored for hypokalemia and hypophosphatemia. Tamiflu given for Influenza A, monitored off antibiotics.    Major To Dos  [ ] f/u endocrinology recs  [ ] f/u cardiology recs (pediatric echo, LE venous duplex, pt on Eliquis)  [ ] f/u COLLEEN labs    REVIEW OF SYSTEMS:  CONSTITUTIONAL:  No weakness, fevers   EYES/ENT:  No visual changes; no facial pain, no hearring difficulties   NECK/SPINE:  No pain or stiffness  RESPIRATORY:  No cough, wheezing, hemoptysis; No shortness of breath  CARDIOVASCULAR:  No chest pain or palpitations  GASTROINTESTINAL:  Tolerates food well. No abdominal or epigastric pain. No nausea/vomiting ; No diarrhea/constipation. No melena/hematochezia.  GENITOURINARY:  No dysuria, frequency or hematuria  MUSCULOSKELETAL: No concerns with ambulation, No calf tenderness, no calf swelling   NEUROLOGICAL:  No numbness/weakness  PSYCH: Mood is appropriate   SKIN:  No itching, rashes    MEDICATIONS:  apixaban 5 milliGRAM(s) Oral two times a day  chlorhexidine 2% Cloths 1 Application(s) Topical <User Schedule>  dextrose 5%. 1000 milliLiter(s) IV Continuous <Continuous>  dextrose 5%. 1000 milliLiter(s) IV Continuous <Continuous>  dextrose 50% Injectable 25 Gram(s) IV Push once  dextrose 50% Injectable 12.5 Gram(s) IV Push once  dextrose Oral Gel 15 Gram(s) Oral once  glucagon  Injectable 1 milliGRAM(s) IntraMuscular once  influenza   Vaccine 0.5 milliLiter(s) IntraMuscular once  insulin glargine Injectable (LANTUS) 18 Unit(s) SubCutaneous at bedtime  insulin lispro (ADMELOG) corrective regimen sliding scale   SubCutaneous three times a day before meals  insulin lispro (ADMELOG) corrective regimen sliding scale   SubCutaneous at bedtime  insulin lispro Injectable (ADMELOG) 5 Unit(s) SubCutaneous three times a day before meals  oseltamivir 75 milliGRAM(s) Oral two times a day      T(C): 36.1 (01-30-25 @ 08:00), Max: 36.3 (01-29-25 @ 12:00)  HR: 67 (01-30-25 @ 08:00) (51 - 70)  BP: 111/70 (01-30-25 @ 08:00) (94/62 - 126/78)  RR: 16 (01-30-25 @ 08:00) (11 - 19)  SpO2: 99% (01-30-25 @ 08:00) (96% - 100%)  Wt(kg): --Vital Signs Last 24 Hrs  T(C): 36.1 (30 Jan 2025 08:00), Max: 36.3 (29 Jan 2025 12:00)  T(F): 97 (30 Jan 2025 08:00), Max: 97.4 (29 Jan 2025 12:00)  HR: 67 (30 Jan 2025 08:00) (51 - 70)  BP: 111/70 (30 Jan 2025 08:00) (94/62 - 126/78)  BP(mean): 83 (30 Jan 2025 08:00) (69 - 90)  RR: 16 (30 Jan 2025 08:00) (11 - 19)  SpO2: 99% (30 Jan 2025 08:00) (96% - 100%)    Parameters below as of 30 Jan 2025 08:00  Patient On (Oxygen Delivery Method): room air        PHYSICAL EXAM:  GENERAL: No acute distress, resting comfortably   HEAD:  Atraumatic, normocephalic  EYES: Eye movements are appropriate, pupils responsive to light, conjunctiva and sclera clear  NECK: Supple, no swelling , JVD not appreciated   HEART: Regular rate and rhythm, no murmurs or other pathological heart sounds   LUNGS: Unlabored respirations. Clear to auscultation bilaterally, no crackles, wheezing, or rhonchi  ABDOMEN: Soft, nontender/ nondistended, +BS  EXTREMITIES: +2 pulses, moves appropriately w /5 strength, no edema   NEURO:  A&Ox3, CNs not assessed  PSYCH: Affect is appropriate   SKIN: No rashes or lesions   Lines:    Consultant(s) Notes Reviewed:  [x ] YES  [ ] NO  Care Discussed with Consultants/Other Providers [ x] YES  [ ] NO    LABS:                        13.1   2.31  )-----------( 90       ( 30 Jan 2025 01:43 )             37.7     01-30    133[L]  |  108[H]  |  9   ----------------------------<  180[H]  4.1   |  15[L]  |  0.61    Ca    7.4[L]      30 Jan 2025 01:43  Phos  3.1     01-30  Mg     2.40     01-30    TPro  5.3[L]  /  Alb  2.9[L]  /  TBili  0.4  /  DBili  x   /  AST  26  /  ALT  12  /  AlkPhos  32[L]  01-30    PT/INR - ( 30 Jan 2025 01:43 )   PT: 12.4 sec;   INR: 1.04 ratio         PTT - ( 30 Jan 2025 01:43 )  PTT:28.8 sec  Urinalysis Basic - ( 30 Jan 2025 01:43 )    Color: x / Appearance: x / SG: x / pH: x  Gluc: 180 mg/dL / Ketone: x  / Bili: x / Urobili: x   Blood: x / Protein: x / Nitrite: x   Leuk Esterase: x / RBC: x / WBC x   Sq Epi: x / Non Sq Epi: x / Bacteria: x      CAPILLARY BLOOD GLUCOSE      POCT Blood Glucose.: 156 mg/dL (30 Jan 2025 07:44)  POCT Blood Glucose.: 151 mg/dL (29 Jan 2025 21:57)  POCT Blood Glucose.: 103 mg/dL (29 Jan 2025 19:01)  POCT Blood Glucose.: 141 mg/dL (29 Jan 2025 18:20)  POCT Blood Glucose.: 156 mg/dL (29 Jan 2025 17:22)  POCT Blood Glucose.: 160 mg/dL (29 Jan 2025 16:12)  POCT Blood Glucose.: 180 mg/dL (29 Jan 2025 15:04)  POCT Blood Glucose.: 159 mg/dL (29 Jan 2025 14:33)  POCT Blood Glucose.: 180 mg/dL (29 Jan 2025 13:17)  POCT Blood Glucose.: 204 mg/dL (29 Jan 2025 12:08)  POCT Blood Glucose.: 190 mg/dL (29 Jan 2025 11:00)  POCT Blood Glucose.: 214 mg/dL (29 Jan 2025 10:58)  POCT Blood Glucose.: 194 mg/dL (29 Jan 2025 10:07)  POCT Blood Glucose.: 201 mg/dL (29 Jan 2025 09:09)        Urinalysis Basic - ( 30 Jan 2025 01:43 )    Color: x / Appearance: x / SG: x / pH: x  Gluc: 180 mg/dL / Ketone: x  / Bili: x / Urobili: x   Blood: x / Protein: x / Nitrite: x   Leuk Esterase: x / RBC: x / WBC x   Sq Epi: x / Non Sq Epi: x / Bacteria: x        RADIOLOGY & ADDITIONAL TESTS:    Imaging Personally Reviewed:  [x ] YES  [ ] NO    =====Neurologic=====  Patient is AOx   - sedation:   - Titrate to RAAS -1-0    =====Cardiovascular=====  #SepticShock   Patient was admitted with a WBC ( ) and pressures of ( ) They received ( ) fluids and started on ( ).     Plan  - Titrate pressors for MAP > 65  - pressor:   - Cont. ab as below     =====Pulmonary=====  #AHRF (PNA)  Shortness of breath ISO of WBC. Most likely in the setting of a infectious PNA. Can also consider aspiration.  Chest X-ray showed ( )     - ventilation status:    =====GI=====  #Gastroenteritis    #Transaminitis  Elevation in transaminese in a cholestatic pattern. Most likely in the setting of sepsis. Can also consider acute pathology such as cholestitis.     Plan  - Order RUQ ultrasound  - Continue to monitor     =====Renal/=====  #Electrolytes  - martinez:    =====Infectious Disease=====  Sepsis  WBC was ( ) on admission with a fever ( )  tmax. All in the setting of (Symptoms). Chest xray was significant for ( ), CT was noted for ( _ In terms of sources they include ( ). Patient received ( AB) in the ED. Blood cultures.   Antibiotic course :  Blood cultures:   MRSA:     - Continue with Antibiotic (Pip tazo, meropenum, ertapenum, ceftriaxone etc)   - FU blood cultures   - Continue shock treatment as above     =====Endocrine=====  No concerns presently.     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 Pt not on any medications at home, no previous diagnosis of DM. High anion gap metabolic acidosis with elevated BHB c/w DKA, suspect COLLEEN.    MICU Course: Pt started on insulin gtt per DKA protocol, transitioned off drip 1/29. Monitored for hypokalemia and hypophosphatemia. Tamiflu given for Influenza A, monitored off antibiotics.    Major To Dos  [ ] f/u endocrinology recs  [ ] f/u cardiology recs (pediatric echo, LE venous duplex, pt on Eliquis)  [ ] f/u COLLEEN labs    REVIEW OF SYSTEMS:  CONSTITUTIONAL:  No weakness, fevers   EYES/ENT:  No visual changes; no facial pain, no hearring difficulties   NECK/SPINE:  No pain or stiffness  RESPIRATORY:  No cough, wheezing, hemoptysis; No shortness of breath  CARDIOVASCULAR:  No chest pain or palpitations  GASTROINTESTINAL:  Tolerates food well. No abdominal or epigastric pain. No nausea/vomiting ; No diarrhea/constipation. No melena/hematochezia.  GENITOURINARY:  No dysuria, frequency or hematuria  MUSCULOSKELETAL: No concerns with ambulation, No calf tenderness, no calf swelling   NEUROLOGICAL:  No numbness/weakness  PSYCH: Mood is appropriate   SKIN:  No itching, rashes    MEDICATIONS:  apixaban 5 milliGRAM(s) Oral two times a day  chlorhexidine 2% Cloths 1 Application(s) Topical <User Schedule>  dextrose 5%. 1000 milliLiter(s) IV Continuous <Continuous>  dextrose 5%. 1000 milliLiter(s) IV Continuous <Continuous>  dextrose 50% Injectable 25 Gram(s) IV Push once  dextrose 50% Injectable 12.5 Gram(s) IV Push once  dextrose Oral Gel 15 Gram(s) Oral once  glucagon  Injectable 1 milliGRAM(s) IntraMuscular once  influenza   Vaccine 0.5 milliLiter(s) IntraMuscular once  insulin glargine Injectable (LANTUS) 18 Unit(s) SubCutaneous at bedtime  insulin lispro (ADMELOG) corrective regimen sliding scale   SubCutaneous three times a day before meals  insulin lispro (ADMELOG) corrective regimen sliding scale   SubCutaneous at bedtime  insulin lispro Injectable (ADMELOG) 5 Unit(s) SubCutaneous three times a day before meals  oseltamivir 75 milliGRAM(s) Oral two times a day      T(C): 36.1 (01-30-25 @ 08:00), Max: 36.3 (01-29-25 @ 12:00)  HR: 67 (01-30-25 @ 08:00) (51 - 70)  BP: 111/70 (01-30-25 @ 08:00) (94/62 - 126/78)  RR: 16 (01-30-25 @ 08:00) (11 - 19)  SpO2: 99% (01-30-25 @ 08:00) (96% - 100%)  Wt(kg): --Vital Signs Last 24 Hrs  T(C): 36.1 (30 Jan 2025 08:00), Max: 36.3 (29 Jan 2025 12:00)  T(F): 97 (30 Jan 2025 08:00), Max: 97.4 (29 Jan 2025 12:00)  HR: 67 (30 Jan 2025 08:00) (51 - 70)  BP: 111/70 (30 Jan 2025 08:00) (94/62 - 126/78)  BP(mean): 83 (30 Jan 2025 08:00) (69 - 90)  RR: 16 (30 Jan 2025 08:00) (11 - 19)  SpO2: 99% (30 Jan 2025 08:00) (96% - 100%)    Parameters below as of 30 Jan 2025 08:00  Patient On (Oxygen Delivery Method): room air        PHYSICAL EXAM:  GENERAL: No acute distress, resting comfortably   HEAD:  Atraumatic, normocephalic  EYES: Eye movements are appropriate, pupils responsive to light, conjunctiva and sclera clear  NECK: Supple, no swelling , JVD not appreciated   HEART: Regular rate and rhythm, no murmurs or other pathological heart sounds   LUNGS: Unlabored respirations. Clear to auscultation bilaterally, no crackles, wheezing, or rhonchi  ABDOMEN: Soft, nontender/ nondistended, +BS  EXTREMITIES: +2 pulses, moves appropriately w /5 strength, no edema   NEURO:  A&Ox3, CNs not assessed  PSYCH: Affect is appropriate   SKIN: No rashes or lesions   Lines:    Consultant(s) Notes Reviewed:  [x ] YES  [ ] NO  Care Discussed with Consultants/Other Providers [ x] YES  [ ] NO    LABS:                        13.1   2.31  )-----------( 90       ( 30 Jan 2025 01:43 )             37.7     01-30    133[L]  |  108[H]  |  9   ----------------------------<  180[H]  4.1   |  15[L]  |  0.61    Ca    7.4[L]      30 Jan 2025 01:43  Phos  3.1     01-30  Mg     2.40     01-30    TPro  5.3[L]  /  Alb  2.9[L]  /  TBili  0.4  /  DBili  x   /  AST  26  /  ALT  12  /  AlkPhos  32[L]  01-30    PT/INR - ( 30 Jan 2025 01:43 )   PT: 12.4 sec;   INR: 1.04 ratio         PTT - ( 30 Jan 2025 01:43 )  PTT:28.8 sec  Urinalysis Basic - ( 30 Jan 2025 01:43 )    Color: x / Appearance: x / SG: x / pH: x  Gluc: 180 mg/dL / Ketone: x  / Bili: x / Urobili: x   Blood: x / Protein: x / Nitrite: x   Leuk Esterase: x / RBC: x / WBC x   Sq Epi: x / Non Sq Epi: x / Bacteria: x      CAPILLARY BLOOD GLUCOSE      POCT Blood Glucose.: 156 mg/dL (30 Jan 2025 07:44)  POCT Blood Glucose.: 151 mg/dL (29 Jan 2025 21:57)  POCT Blood Glucose.: 103 mg/dL (29 Jan 2025 19:01)  POCT Blood Glucose.: 141 mg/dL (29 Jan 2025 18:20)  POCT Blood Glucose.: 156 mg/dL (29 Jan 2025 17:22)  POCT Blood Glucose.: 160 mg/dL (29 Jan 2025 16:12)  POCT Blood Glucose.: 180 mg/dL (29 Jan 2025 15:04)  POCT Blood Glucose.: 159 mg/dL (29 Jan 2025 14:33)  POCT Blood Glucose.: 180 mg/dL (29 Jan 2025 13:17)  POCT Blood Glucose.: 204 mg/dL (29 Jan 2025 12:08)  POCT Blood Glucose.: 190 mg/dL (29 Jan 2025 11:00)  POCT Blood Glucose.: 214 mg/dL (29 Jan 2025 10:58)  POCT Blood Glucose.: 194 mg/dL (29 Jan 2025 10:07)  POCT Blood Glucose.: 201 mg/dL (29 Jan 2025 09:09)        Urinalysis Basic - ( 30 Jan 2025 01:43 )    Color: x / Appearance: x / SG: x / pH: x  Gluc: 180 mg/dL / Ketone: x  / Bili: x / Urobili: x   Blood: x / Protein: x / Nitrite: x   Leuk Esterase: x / RBC: x / WBC x   Sq Epi: x / Non Sq Epi: x / Bacteria: x        RADIOLOGY & ADDITIONAL TESTS:    Imaging Personally Reviewed:  [x ] YES  [ ] NO    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)  - No active issues, sees pediatric cardiology (last seen 1/17/23)  - cardiology consulted  - full AC started with Eliquis  - f/u LE venous duplex to r/o DVT  - f/u pediatric echocardiogram  - continue tele monitoring      ======RESPIRATORY=====  - No active issues, pt on RA      =====GI/NUTRITION=====  - No active issues      =====/RENAL=====  - No active issues      =====INFECTIOUS DISEASE=====  #Influenza A  - Start oseltamivir 75 BID 5 days (1/28-2/1)      =====ENDOCRINE=====  #DKA  #Suspect COLLEEN  - Pt not on any medications at home, no previous diagnosis of DM  - High anion gap metabolic acidosis with elevated BHB c/w DKA  - A1C 14.4  - Start DKA protocol, transitioned 1/29  - D5 LR   - BMP q4h  - endocrinology consult  - will check COLLEEN labs; zinc transporter antibody, glutamic acid decarboxylase antibody, Islet antigen (IA-2) antibody      =====HEMATOLOGIC/DVT PPX=====  - No active issues    - DVT PPX: Eliquis      =====ETHICS=====  - Code status: full code  - Dispo: remain in MICU MICU Transfer Note     -----Tania Jayslick PGY-1-----    Transfer from: MICU  Transfer to:  (X) Medicine    (  ) Telemetry    (  ) RCU    (  ) Palliative    (  ) Stroke Unit    (  ) _______________  Accepting Physician:   Bed Assignment:  Signout given to ______    MICU admitted for: new onset DKA    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 Pt not on any medications at home, no previous diagnosis of DM. High anion gap metabolic acidosis with elevated BHB c/w DKA, suspect COLLEEN.    MICU Course: Pt started on insulin gtt per DKA protocol, transitioned off drip 1/29. Monitored for hypokalemia and hypophosphatemia. Tamiflu given for Influenza A, monitored off antibiotics.    Major To Dos  [ ] f/u endocrinology recs  [ ] f/u cardiology recs (f/u LE venous duplex)  [ ] f/u COLLEEN labs    REVIEW OF SYSTEMS:  CONSTITUTIONAL:  No weakness, fevers   EYES/ENT:  No visual changes; no facial pain, no hearring difficulties   NECK/SPINE:  No pain or stiffness  RESPIRATORY:  No cough, wheezing, hemoptysis; No shortness of breath  CARDIOVASCULAR:  No chest pain or palpitations  GASTROINTESTINAL:  Tolerates food well. No abdominal or epigastric pain. No nausea/vomiting ; No diarrhea/constipation. No melena/hematochezia.  GENITOURINARY:  No dysuria, frequency or hematuria  MUSCULOSKELETAL: No concerns with ambulation, No calf tenderness, no calf swelling   NEUROLOGICAL:  No numbness/weakness  PSYCH: Mood is appropriate   SKIN:  No itching, rashes    MEDICATIONS:  apixaban 5 milliGRAM(s) Oral two times a day  chlorhexidine 2% Cloths 1 Application(s) Topical <User Schedule>  dextrose 5%. 1000 milliLiter(s) IV Continuous <Continuous>  dextrose 5%. 1000 milliLiter(s) IV Continuous <Continuous>  dextrose 50% Injectable 25 Gram(s) IV Push once  dextrose 50% Injectable 12.5 Gram(s) IV Push once  dextrose Oral Gel 15 Gram(s) Oral once  glucagon  Injectable 1 milliGRAM(s) IntraMuscular once  influenza   Vaccine 0.5 milliLiter(s) IntraMuscular once  insulin glargine Injectable (LANTUS) 18 Unit(s) SubCutaneous at bedtime  insulin lispro (ADMELOG) corrective regimen sliding scale   SubCutaneous three times a day before meals  insulin lispro (ADMELOG) corrective regimen sliding scale   SubCutaneous at bedtime  insulin lispro Injectable (ADMELOG) 5 Unit(s) SubCutaneous three times a day before meals  oseltamivir 75 milliGRAM(s) Oral two times a day      T(C): 36.1 (01-30-25 @ 08:00), Max: 36.3 (01-29-25 @ 12:00)  HR: 67 (01-30-25 @ 08:00) (51 - 70)  BP: 111/70 (01-30-25 @ 08:00) (94/62 - 126/78)  RR: 16 (01-30-25 @ 08:00) (11 - 19)  SpO2: 99% (01-30-25 @ 08:00) (96% - 100%)  Wt(kg): --Vital Signs Last 24 Hrs  T(C): 36.1 (30 Jan 2025 08:00), Max: 36.3 (29 Jan 2025 12:00)  T(F): 97 (30 Jan 2025 08:00), Max: 97.4 (29 Jan 2025 12:00)  HR: 67 (30 Jan 2025 08:00) (51 - 70)  BP: 111/70 (30 Jan 2025 08:00) (94/62 - 126/78)  BP(mean): 83 (30 Jan 2025 08:00) (69 - 90)  RR: 16 (30 Jan 2025 08:00) (11 - 19)  SpO2: 99% (30 Jan 2025 08:00) (96% - 100%)    Parameters below as of 30 Jan 2025 08:00  Patient On (Oxygen Delivery Method): room air        PHYSICAL EXAM:  GENERAL: No acute distress, resting comfortably   HEAD:  Atraumatic, normocephalic  EYES: Eye movements are appropriate, pupils responsive to light, conjunctiva and sclera clear  NECK: Supple, no swelling , JVD not appreciated   HEART: Regular rate and rhythm, no murmurs or other pathological heart sounds   LUNGS: Unlabored respirations. Clear to auscultation bilaterally, no crackles, wheezing, or rhonchi  ABDOMEN: Soft, nontender/ nondistended, +BS  EXTREMITIES: +2 pulses, moves appropriately w /5 strength, no edema   NEURO:  A&Ox3, CNs not assessed  PSYCH: Affect is appropriate   SKIN: No rashes or lesions   Lines:    Consultant(s) Notes Reviewed:  [x ] YES  [ ] NO  Care Discussed with Consultants/Other Providers [ x] YES  [ ] NO    LABS:                        13.1   2.31  )-----------( 90       ( 30 Jan 2025 01:43 )             37.7     01-30    133[L]  |  108[H]  |  9   ----------------------------<  180[H]  4.1   |  15[L]  |  0.61    Ca    7.4[L]      30 Jan 2025 01:43  Phos  3.1     01-30  Mg     2.40     01-30    TPro  5.3[L]  /  Alb  2.9[L]  /  TBili  0.4  /  DBili  x   /  AST  26  /  ALT  12  /  AlkPhos  32[L]  01-30    PT/INR - ( 30 Jan 2025 01:43 )   PT: 12.4 sec;   INR: 1.04 ratio         PTT - ( 30 Jan 2025 01:43 )  PTT:28.8 sec  Urinalysis Basic - ( 30 Jan 2025 01:43 )    Color: x / Appearance: x / SG: x / pH: x  Gluc: 180 mg/dL / Ketone: x  / Bili: x / Urobili: x   Blood: x / Protein: x / Nitrite: x   Leuk Esterase: x / RBC: x / WBC x   Sq Epi: x / Non Sq Epi: x / Bacteria: x      CAPILLARY BLOOD GLUCOSE      POCT Blood Glucose.: 156 mg/dL (30 Jan 2025 07:44)  POCT Blood Glucose.: 151 mg/dL (29 Jan 2025 21:57)  POCT Blood Glucose.: 103 mg/dL (29 Jan 2025 19:01)  POCT Blood Glucose.: 141 mg/dL (29 Jan 2025 18:20)  POCT Blood Glucose.: 156 mg/dL (29 Jan 2025 17:22)  POCT Blood Glucose.: 160 mg/dL (29 Jan 2025 16:12)  POCT Blood Glucose.: 180 mg/dL (29 Jan 2025 15:04)  POCT Blood Glucose.: 159 mg/dL (29 Jan 2025 14:33)  POCT Blood Glucose.: 180 mg/dL (29 Jan 2025 13:17)  POCT Blood Glucose.: 204 mg/dL (29 Jan 2025 12:08)  POCT Blood Glucose.: 190 mg/dL (29 Jan 2025 11:00)  POCT Blood Glucose.: 214 mg/dL (29 Jan 2025 10:58)  POCT Blood Glucose.: 194 mg/dL (29 Jan 2025 10:07)  POCT Blood Glucose.: 201 mg/dL (29 Jan 2025 09:09)        Urinalysis Basic - ( 30 Jan 2025 01:43 )    Color: x / Appearance: x / SG: x / pH: x  Gluc: 180 mg/dL / Ketone: x  / Bili: x / Urobili: x   Blood: x / Protein: x / Nitrite: x   Leuk Esterase: x / RBC: x / WBC x   Sq Epi: x / Non Sq Epi: x / Bacteria: x        RADIOLOGY & ADDITIONAL TESTS:    Imaging Personally Reviewed:  [x ] YES  [ ] NO    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)  - No active issues, sees pediatric cardiology (last seen 1/17/23)  - cardiology consulted  - full AC started with Eliquis  - f/u LE venous duplex to r/o DVT  - f/u pediatric echocardiogram  - continue tele monitoring      ======RESPIRATORY=====  - No active issues, pt on RA      =====GI/NUTRITION=====  - No active issues      =====/RENAL=====  - No active issues      =====INFECTIOUS DISEASE=====  #Influenza A  - Start oseltamivir 75 BID 5 days (1/28-2/1)      =====ENDOCRINE=====  #DKA  #Suspect COLLEEN  - Pt not on any medications at home, no previous diagnosis of DM  - High anion gap metabolic acidosis with elevated BHB c/w DKA  - A1C 14.4  - Start DKA protocol, transitioned 1/29  - D5 LR   - BMP q4h  - endocrinology consult  - will check COLLEEN labs; zinc transporter antibody, glutamic acid decarboxylase antibody, Islet antigen (IA-2) antibody      =====HEMATOLOGIC/DVT PPX=====  - No active issues    - DVT PPX: Eliquis      =====ETHICS=====  - Code status: full code  - Dispo: remain in MICU MICU Transfer Note     -----Tania Jayslick PGY-1-----    Transfer from: MICU  Transfer to:  ( ) Medicine    (X) Telemetry    (  ) RCU    (  ) Palliative    (  ) Stroke Unit    (  ) _______________  Accepting Physician:   Bed Assignment:  Signout given to ______    MICU admitted for: new onset DKA    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 Pt not on any medications at home, no previous diagnosis of DM. High anion gap metabolic acidosis with elevated BHB c/w DKA, suspect COLLEEN.    MICU Course: Pt started on insulin gtt per DKA protocol, transitioned off drip 1/29. Monitored for hypokalemia and hypophosphatemia. Tamiflu given for Influenza A, monitored off antibiotics.    Major To Dos  [ ] f/u endocrinology recs  [ ] f/u cardiology recs (f/u LE venous duplex)  [ ] f/u COLLEEN labs    REVIEW OF SYSTEMS:  CONSTITUTIONAL:  No weakness, fevers   EYES/ENT:  No visual changes; no facial pain, no hearring difficulties   NECK/SPINE:  No pain or stiffness  RESPIRATORY:  No cough, wheezing, hemoptysis; No shortness of breath  CARDIOVASCULAR:  No chest pain or palpitations  GASTROINTESTINAL:  Tolerates food well. No abdominal or epigastric pain. No nausea/vomiting ; No diarrhea/constipation. No melena/hematochezia.  GENITOURINARY:  No dysuria, frequency or hematuria  MUSCULOSKELETAL: No concerns with ambulation, No calf tenderness, no calf swelling   NEUROLOGICAL:  No numbness/weakness  PSYCH: Mood is appropriate   SKIN:  No itching, rashes    MEDICATIONS:  apixaban 5 milliGRAM(s) Oral two times a day  chlorhexidine 2% Cloths 1 Application(s) Topical <User Schedule>  dextrose 5%. 1000 milliLiter(s) IV Continuous <Continuous>  dextrose 5%. 1000 milliLiter(s) IV Continuous <Continuous>  dextrose 50% Injectable 25 Gram(s) IV Push once  dextrose 50% Injectable 12.5 Gram(s) IV Push once  dextrose Oral Gel 15 Gram(s) Oral once  glucagon  Injectable 1 milliGRAM(s) IntraMuscular once  influenza   Vaccine 0.5 milliLiter(s) IntraMuscular once  insulin glargine Injectable (LANTUS) 18 Unit(s) SubCutaneous at bedtime  insulin lispro (ADMELOG) corrective regimen sliding scale   SubCutaneous three times a day before meals  insulin lispro (ADMELOG) corrective regimen sliding scale   SubCutaneous at bedtime  insulin lispro Injectable (ADMELOG) 5 Unit(s) SubCutaneous three times a day before meals  oseltamivir 75 milliGRAM(s) Oral two times a day      T(C): 36.1 (01-30-25 @ 08:00), Max: 36.3 (01-29-25 @ 12:00)  HR: 67 (01-30-25 @ 08:00) (51 - 70)  BP: 111/70 (01-30-25 @ 08:00) (94/62 - 126/78)  RR: 16 (01-30-25 @ 08:00) (11 - 19)  SpO2: 99% (01-30-25 @ 08:00) (96% - 100%)  Wt(kg): --Vital Signs Last 24 Hrs  T(C): 36.1 (30 Jan 2025 08:00), Max: 36.3 (29 Jan 2025 12:00)  T(F): 97 (30 Jan 2025 08:00), Max: 97.4 (29 Jan 2025 12:00)  HR: 67 (30 Jan 2025 08:00) (51 - 70)  BP: 111/70 (30 Jan 2025 08:00) (94/62 - 126/78)  BP(mean): 83 (30 Jan 2025 08:00) (69 - 90)  RR: 16 (30 Jan 2025 08:00) (11 - 19)  SpO2: 99% (30 Jan 2025 08:00) (96% - 100%)    Parameters below as of 30 Jan 2025 08:00  Patient On (Oxygen Delivery Method): room air        PHYSICAL EXAM:  GENERAL: No acute distress, resting comfortably   HEAD:  Atraumatic, normocephalic  EYES: Eye movements are appropriate, pupils responsive to light, conjunctiva and sclera clear  NECK: Supple, no swelling , JVD not appreciated   HEART: Regular rate and rhythm, no murmurs or other pathological heart sounds   LUNGS: Unlabored respirations. Clear to auscultation bilaterally, no crackles, wheezing, or rhonchi  ABDOMEN: Soft, nontender/ nondistended, +BS  EXTREMITIES: +2 pulses, moves appropriately w /5 strength, no edema   NEURO:  A&Ox3, CNs not assessed  PSYCH: Affect is appropriate   SKIN: No rashes or lesions   Lines:    Consultant(s) Notes Reviewed:  [x ] YES  [ ] NO  Care Discussed with Consultants/Other Providers [ x] YES  [ ] NO    LABS:                        13.1   2.31  )-----------( 90       ( 30 Jan 2025 01:43 )             37.7     01-30    133[L]  |  108[H]  |  9   ----------------------------<  180[H]  4.1   |  15[L]  |  0.61    Ca    7.4[L]      30 Jan 2025 01:43  Phos  3.1     01-30  Mg     2.40     01-30    TPro  5.3[L]  /  Alb  2.9[L]  /  TBili  0.4  /  DBili  x   /  AST  26  /  ALT  12  /  AlkPhos  32[L]  01-30    PT/INR - ( 30 Jan 2025 01:43 )   PT: 12.4 sec;   INR: 1.04 ratio         PTT - ( 30 Jan 2025 01:43 )  PTT:28.8 sec  Urinalysis Basic - ( 30 Jan 2025 01:43 )    Color: x / Appearance: x / SG: x / pH: x  Gluc: 180 mg/dL / Ketone: x  / Bili: x / Urobili: x   Blood: x / Protein: x / Nitrite: x   Leuk Esterase: x / RBC: x / WBC x   Sq Epi: x / Non Sq Epi: x / Bacteria: x      CAPILLARY BLOOD GLUCOSE      POCT Blood Glucose.: 156 mg/dL (30 Jan 2025 07:44)  POCT Blood Glucose.: 151 mg/dL (29 Jan 2025 21:57)  POCT Blood Glucose.: 103 mg/dL (29 Jan 2025 19:01)  POCT Blood Glucose.: 141 mg/dL (29 Jan 2025 18:20)  POCT Blood Glucose.: 156 mg/dL (29 Jan 2025 17:22)  POCT Blood Glucose.: 160 mg/dL (29 Jan 2025 16:12)  POCT Blood Glucose.: 180 mg/dL (29 Jan 2025 15:04)  POCT Blood Glucose.: 159 mg/dL (29 Jan 2025 14:33)  POCT Blood Glucose.: 180 mg/dL (29 Jan 2025 13:17)  POCT Blood Glucose.: 204 mg/dL (29 Jan 2025 12:08)  POCT Blood Glucose.: 190 mg/dL (29 Jan 2025 11:00)  POCT Blood Glucose.: 214 mg/dL (29 Jan 2025 10:58)  POCT Blood Glucose.: 194 mg/dL (29 Jan 2025 10:07)  POCT Blood Glucose.: 201 mg/dL (29 Jan 2025 09:09)        Urinalysis Basic - ( 30 Jan 2025 01:43 )    Color: x / Appearance: x / SG: x / pH: x  Gluc: 180 mg/dL / Ketone: x  / Bili: x / Urobili: x   Blood: x / Protein: x / Nitrite: x   Leuk Esterase: x / RBC: x / WBC x   Sq Epi: x / Non Sq Epi: x / Bacteria: x        RADIOLOGY & ADDITIONAL TESTS:    Imaging Personally Reviewed:  [x ] YES  [ ] NO    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)  - No active issues, sees pediatric cardiology (last seen 1/17/23)  - cardiology consulted  - full AC started with Eliquis  - f/u LE venous duplex to r/o DVT  - f/u pediatric echocardiogram  - continue tele monitoring      ======RESPIRATORY=====  - No active issues, pt on RA      =====GI/NUTRITION=====  - No active issues      =====/RENAL=====  - No active issues      =====INFECTIOUS DISEASE=====  #Influenza A  - Start oseltamivir 75 BID 5 days (1/28-2/1)      =====ENDOCRINE=====  #DKA  #Suspect COLLEEN  - Pt not on any medications at home, no previous diagnosis of DM  - High anion gap metabolic acidosis with elevated BHB c/w DKA  - A1C 14.4  - Start DKA protocol, transitioned 1/29  - D5 LR   - BMP q4h  - endocrinology consult  - will check COLLEEN labs; zinc transporter antibody, glutamic acid decarboxylase antibody, Islet antigen (IA-2) antibody      =====HEMATOLOGIC/DVT PPX=====  - No active issues    - DVT PPX: Eliquis      =====ETHICS=====  - Code status: full code  - Dispo: remain in MICU MICU Transfer Note     -----Tania Jayslick PGY-1-----    Transfer from: MICU  Transfer to:  ( ) Medicine    (X) Telemetry    (  ) RCU    (  ) Palliative    (  ) Stroke Unit    (  ) _______________  Accepting Physician:   Bed Assignment:  Signout given to ______    MICU admitted for: new onset DKA    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 Pt not on any medications at home, no previous diagnosis of DM. High anion gap metabolic acidosis with elevated BHB c/w DKA, suspect COLLEEN.    MICU Course: Pt started on insulin gtt per DKA protocol, transitioned off drip 1/29. Monitored for hypokalemia and hypophosphatemia. Tamiflu given for Influenza A, monitored off antibiotics.    Major To Dos  [ ] f/u endocrinology recs  [ ] f/u cardiology recs  [ ] f/u COLLEEN labs    REVIEW OF SYSTEMS:  CONSTITUTIONAL:  No weakness, fevers   EYES/ENT:  No visual changes; no facial pain, no hearring difficulties   NECK/SPINE:  No pain or stiffness  RESPIRATORY:  No cough, wheezing, hemoptysis; No shortness of breath  CARDIOVASCULAR:  No chest pain or palpitations  GASTROINTESTINAL:  Tolerates food well. No abdominal or epigastric pain. No nausea/vomiting ; No diarrhea/constipation. No melena/hematochezia.  GENITOURINARY:  No dysuria, frequency or hematuria  MUSCULOSKELETAL: No concerns with ambulation, No calf tenderness, no calf swelling   NEUROLOGICAL:  No numbness/weakness  PSYCH: Mood is appropriate   SKIN:  No itching, rashes    MEDICATIONS:  apixaban 5 milliGRAM(s) Oral two times a day  chlorhexidine 2% Cloths 1 Application(s) Topical <User Schedule>  dextrose 5%. 1000 milliLiter(s) IV Continuous <Continuous>  dextrose 5%. 1000 milliLiter(s) IV Continuous <Continuous>  dextrose 50% Injectable 25 Gram(s) IV Push once  dextrose 50% Injectable 12.5 Gram(s) IV Push once  dextrose Oral Gel 15 Gram(s) Oral once  glucagon  Injectable 1 milliGRAM(s) IntraMuscular once  influenza   Vaccine 0.5 milliLiter(s) IntraMuscular once  insulin glargine Injectable (LANTUS) 18 Unit(s) SubCutaneous at bedtime  insulin lispro (ADMELOG) corrective regimen sliding scale   SubCutaneous three times a day before meals  insulin lispro (ADMELOG) corrective regimen sliding scale   SubCutaneous at bedtime  insulin lispro Injectable (ADMELOG) 5 Unit(s) SubCutaneous three times a day before meals  oseltamivir 75 milliGRAM(s) Oral two times a day      T(C): 36.1 (01-30-25 @ 08:00), Max: 36.3 (01-29-25 @ 12:00)  HR: 67 (01-30-25 @ 08:00) (51 - 70)  BP: 111/70 (01-30-25 @ 08:00) (94/62 - 126/78)  RR: 16 (01-30-25 @ 08:00) (11 - 19)  SpO2: 99% (01-30-25 @ 08:00) (96% - 100%)  Wt(kg): --Vital Signs Last 24 Hrs  T(C): 36.1 (30 Jan 2025 08:00), Max: 36.3 (29 Jan 2025 12:00)  T(F): 97 (30 Jan 2025 08:00), Max: 97.4 (29 Jan 2025 12:00)  HR: 67 (30 Jan 2025 08:00) (51 - 70)  BP: 111/70 (30 Jan 2025 08:00) (94/62 - 126/78)  BP(mean): 83 (30 Jan 2025 08:00) (69 - 90)  RR: 16 (30 Jan 2025 08:00) (11 - 19)  SpO2: 99% (30 Jan 2025 08:00) (96% - 100%)    Parameters below as of 30 Jan 2025 08:00  Patient On (Oxygen Delivery Method): room air        PHYSICAL EXAM:  GENERAL: No acute distress, resting comfortably   HEAD:  Atraumatic, normocephalic  EYES: Eye movements are appropriate, pupils responsive to light, conjunctiva and sclera clear  NECK: Supple, no swelling , JVD not appreciated   HEART: Regular rate and rhythm, no murmurs or other pathological heart sounds   LUNGS: Unlabored respirations. Clear to auscultation bilaterally, no crackles, wheezing, or rhonchi  ABDOMEN: Soft, nontender/ nondistended, +BS  EXTREMITIES: +2 pulses, moves appropriately w /5 strength, no edema   NEURO:  A&Ox3, CNs not assessed  PSYCH: Affect is appropriate   SKIN: No rashes or lesions   Lines:    Consultant(s) Notes Reviewed:  [x ] YES  [ ] NO  Care Discussed with Consultants/Other Providers [ x] YES  [ ] NO    LABS:                        13.1   2.31  )-----------( 90       ( 30 Jan 2025 01:43 )             37.7     01-30    133[L]  |  108[H]  |  9   ----------------------------<  180[H]  4.1   |  15[L]  |  0.61    Ca    7.4[L]      30 Jan 2025 01:43  Phos  3.1     01-30  Mg     2.40     01-30    TPro  5.3[L]  /  Alb  2.9[L]  /  TBili  0.4  /  DBili  x   /  AST  26  /  ALT  12  /  AlkPhos  32[L]  01-30    PT/INR - ( 30 Jan 2025 01:43 )   PT: 12.4 sec;   INR: 1.04 ratio         PTT - ( 30 Jan 2025 01:43 )  PTT:28.8 sec  Urinalysis Basic - ( 30 Jan 2025 01:43 )    Color: x / Appearance: x / SG: x / pH: x  Gluc: 180 mg/dL / Ketone: x  / Bili: x / Urobili: x   Blood: x / Protein: x / Nitrite: x   Leuk Esterase: x / RBC: x / WBC x   Sq Epi: x / Non Sq Epi: x / Bacteria: x      CAPILLARY BLOOD GLUCOSE      POCT Blood Glucose.: 156 mg/dL (30 Jan 2025 07:44)  POCT Blood Glucose.: 151 mg/dL (29 Jan 2025 21:57)  POCT Blood Glucose.: 103 mg/dL (29 Jan 2025 19:01)  POCT Blood Glucose.: 141 mg/dL (29 Jan 2025 18:20)  POCT Blood Glucose.: 156 mg/dL (29 Jan 2025 17:22)  POCT Blood Glucose.: 160 mg/dL (29 Jan 2025 16:12)  POCT Blood Glucose.: 180 mg/dL (29 Jan 2025 15:04)  POCT Blood Glucose.: 159 mg/dL (29 Jan 2025 14:33)  POCT Blood Glucose.: 180 mg/dL (29 Jan 2025 13:17)  POCT Blood Glucose.: 204 mg/dL (29 Jan 2025 12:08)  POCT Blood Glucose.: 190 mg/dL (29 Jan 2025 11:00)  POCT Blood Glucose.: 214 mg/dL (29 Jan 2025 10:58)  POCT Blood Glucose.: 194 mg/dL (29 Jan 2025 10:07)  POCT Blood Glucose.: 201 mg/dL (29 Jan 2025 09:09)        Urinalysis Basic - ( 30 Jan 2025 01:43 )    Color: x / Appearance: x / SG: x / pH: x  Gluc: 180 mg/dL / Ketone: x  / Bili: x / Urobili: x   Blood: x / Protein: x / Nitrite: x   Leuk Esterase: x / RBC: x / WBC x   Sq Epi: x / Non Sq Epi: x / Bacteria: x        RADIOLOGY & ADDITIONAL TESTS:    Imaging Personally Reviewed:  [x ] YES  [ ] NO    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)  - No active issues, sees pediatric cardiology (last seen 1/17/23)  - cardiology consulted  - full AC started with Eliquis  - continue tele monitoring      ======RESPIRATORY=====  - No active issues, pt on RA      =====GI/NUTRITION=====  - No active issues      =====/RENAL=====  - No active issues      =====INFECTIOUS DISEASE=====  #Influenza A  - Start oseltamivir 75 BID 5 days (1/28-2/1)      =====ENDOCRINE=====  #DKA  #Suspect COLLEEN  - Pt not on any medications at home, no previous diagnosis of DM  - High anion gap metabolic acidosis with elevated BHB c/w DKA  - A1C 14.4  - Start DKA protocol, transitioned 1/29  - D5 LR   - BMP q4h  - endocrinology consult  - will check COLLEEN labs; zinc transporter antibody, glutamic acid decarboxylase antibody, Islet antigen (IA-2) antibody      =====HEMATOLOGIC/DVT PPX=====  - No active issues    - DVT PPX: Eliquis      =====ETHICS=====  - Code status: full code  - Dispo: remain in MICU

## 2025-01-30 NOTE — ADVANCED PRACTICE NURSE CONSULT - ASSESSMENT
Patient resting in the bed with her  at the bedside. Patient was educated on type 1 diabetes and the difference between type 1 and type 2. Patient was educated on insulin pens, patient demonstrated the how to use an insulin pen via return demonstration. Educated on the difference between short acting and long acting insulin. She was educated on hypoglycemia- the signs and symptoms of low blood sugar and how to treat it. She was shown how to use a gvoke pen, as well as her . She states she has juice boxes at home for her 2 year old daughter if she needed it. Patient was able to verbalizes understanding of how to use a glucometer and a lancet. Patient used return demonstration of a lancet, patient expressed interest in a CGM. Endocrine is aware and will send script. Patient was given educational handouts, patient expressed interest in speaking with an RD- the ICU RD will come in to speak with patient shortly.

## 2025-01-30 NOTE — DIETITIAN INITIAL EVALUATION ADULT - PERTINENT MEDS FT
MEDICATIONS  (STANDING):  apixaban 5 milliGRAM(s) Oral two times a day  chlorhexidine 2% Cloths 1 Application(s) Topical <User Schedule>  dextrose 5%. 1000 milliLiter(s) (50 mL/Hr) IV Continuous <Continuous>  dextrose 5%. 1000 milliLiter(s) (100 mL/Hr) IV Continuous <Continuous>  dextrose 50% Injectable 25 Gram(s) IV Push once  dextrose 50% Injectable 12.5 Gram(s) IV Push once  dextrose Oral Gel 15 Gram(s) Oral once  glucagon  Injectable 1 milliGRAM(s) IntraMuscular once  influenza   Vaccine 0.5 milliLiter(s) IntraMuscular once  insulin glargine Injectable (LANTUS) 18 Unit(s) SubCutaneous at bedtime  insulin lispro (ADMELOG) corrective regimen sliding scale   SubCutaneous three times a day before meals  insulin lispro (ADMELOG) corrective regimen sliding scale   SubCutaneous at bedtime  insulin lispro Injectable (ADMELOG) 5 Unit(s) SubCutaneous three times a day before meals  oseltamivir 75 milliGRAM(s) Oral two times a day    MEDICATIONS  (PRN):

## 2025-01-30 NOTE — DIETITIAN INITIAL EVALUATION ADULT - OTHER INFO
Spoke with endocrinology diabetes nurse educator.     Met with Pt.     Spoke with endocrinology diabetes nurse educator.     Met with Pt. and .  Pt. with constipation (no BM x 4d).  Denies N/V.      Weight loss over past few months attributed to diet/lifestyle changes having not known underlying DM PTA.    Pt. and  very receptive to education.    RDN provided extensive verbal & printed nutrition education re: therapeutic diet.  Discussed carbohydrate food sources, consistent [fiber dense] carbohydrate intake & carb. counting, hypoglycemia prevention/management, & nutrition label reading.  Discouraged consumption of concentrated sweetened beverages.  Also encouraged heart healthy food choices, self glucose monitoring and emphasized endocrinology f/u.     Pt. asks appropriate questions which answered and discussed in detail.

## 2025-01-30 NOTE — DIETITIAN INITIAL EVALUATION ADULT - ORAL INTAKE PTA/DIET HISTORY
Recently increased exercise (M-F) & increased protein intake.  Was consuming poultry, meat, dairy, as well as fruits.  No vegetables or other carbohydrate intake.  NKFA.  Cooks all meals at home.

## 2025-01-30 NOTE — PROGRESS NOTE ADULT - ASSESSMENT
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)  - No active issues, sees pediatric cardiology (last seen 1/17/23)  - cardiology consulted  - full AC started with Eliquis  - f/u LE venous duplex to r/o DVT  - f/u pediatric echocardiogram  - continue tele monitoring      ======RESPIRATORY=====  - No active issues, pt on RA      =====GI/NUTRITION=====  - No active issues      =====/RENAL=====  - No active issues      =====INFECTIOUS DISEASE=====  #Influenza A  - Start oseltamivir 75 BID 5 days (1/28-2/1)      =====ENDOCRINE=====  #DKA  #Suspect COLLEEN  - Pt not on any medications at home, no previous diagnosis of DM  - High anion gap metabolic acidosis with elevated BHB c/w DKA  - A1C 14.4  - Start DKA protocol, transitioned 1/29  - D5 LR   - BMP q4h  - endocrinology consult  - will check COLLEEN labs; zinc transporter antibody, glutamic acid decarboxylase antibody, Islet antigen (IA-2) antibody      =====HEMATOLOGIC/DVT PPX=====  - No active issues    - DVT PPX: Eliquis      =====ETHICS=====  - Code status: full code  - Dispo: remain in MICU

## 2025-01-30 NOTE — PROGRESS NOTE ADULT - ASSESSMENT
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 Nemesio hasn't been followed by [facility] since 01/2023 because she just hasn't been able to make an appointment. Prior to this admission she was doing well from a cardiac perspective, exercising multiple times a week with no symptoms. She also has not taken Eliquis in over a year, but had no particular reason for discontinuing it. Starting a week or two ago she noticed she was having increased thirst, increased urinary frequency, skin flushing/itchiness, and fatigue. Then a few days ago she also began experiencing increased shortness of breath with exertion, so she came to the ED to be evaluated. She was found upon arrival to be Flu A positive, and in DKA with pH 6.96, bicarb <7, anion gap 23, BG 300s, beta hydroxybutyrate 8.1. She is currently in the ICU on IV fluids and insulin gtt. The ED also performed a CTA chest to r/o PE, however it was not timed correctly for her congenital heart disease, and had scattered filling defects throughout the pulmonary arteries. She is feeling better today, saturating well on room air. Nemesio also does note she recently had a 5 hour drive, where she did not get up and move around much. Nemesio denies any active fevers, chills, CP, palpitations, syncope, near syncope, leg swelling, abdominal pain, N/V/D, headache, or dizziness.     1. Dextrocardia with DORV, VSD, straddling AV valve, L-transposed great arteries, Ventricular inversion s/p bidirectional Jordan and non-fenestrated lateral Fontan  - Echocardiogram with normal biventricular function, unobstructed Jordan and Fontan, mild MR/TR,  no acute issues.   - Euvolemic on exam.  - Continue telemetry monitoring.  - Plan for cardiac MRI as an outpatient.   - Outpatient CPET.    2. Atrial Rhythm c/b spinal embolism.   - CT scan with some filling defects, but not timed correctly in setting of congenital heart disease.   - Patient with hx of hypercoagulability with spinal embolism.   - Continue Eliquis 5mg PO BID.  - Check LE venous duplex to r/o DVT.   - Some junctional bradycardia overnight, continue telemetry monitoring.   - Outpatient holter monitor.     3. DKA  - Management per Endocrinology and Medicine teams.   - Continue insulin.

## 2025-01-30 NOTE — DIETITIAN INITIAL EVALUATION ADULT - LITERATURE/VIDEOS GIVEN
- Heart Health Consistent Carbohydrate Nutrition Therapy   - Diabetes Plate Method  - Using nutrition labels  - Hypoglycemia Flyer ([place])

## 2025-01-30 NOTE — PROGRESS NOTE ADULT - SUBJECTIVE AND OBJECTIVE BOX
****Tania Griz[facility] PGY-1*****    Overnight Events: no acute overnight events  Interval Events: pt seen and examined.     OBJECTIVE:  ICU Vital Signs Last 24 Hrs  T(C): 36.3 (30 Jan 2025 04:00), Max: 36.3 (29 Jan 2025 12:00)  T(F): 97.3 (30 Jan 2025 04:00), Max: 97.4 (29 Jan 2025 12:00)  HR: 54 (30 Jan 2025 07:00) (51 - 70)  BP: 103/65 (30 Jan 2025 07:00) (94/62 - 126/78)  BP(mean): 76 (30 Jan 2025 07:00) (69 - 90)  ABP: --  ABP(mean): --  RR: 12 (30 Jan 2025 07:00) (11 - 19)  SpO2: 98% (30 Jan 2025 07:00) (96% - 100%)    O2 Parameters below as of 30 Jan 2025 07:00  Patient On (Oxygen Delivery Method): room air              01-29 @ 07:01  -  01-30 @ 07:00  --------------------------------------------------------  IN: 2427.4 mL / OUT: 2000 mL / NET: 427.4 mL      CAPILLARY BLOOD GLUCOSE      POCT Blood Glucose.: 151 mg/dL (29 Jan 2025 21:57)    PHYSICAL EXAM  GEN: NAD. A&Ox3. Non-toxic appearing.  HEENT: normocephalic, atraumatic, EOMI, PERRL, no scleral icterus, no conjunctival pallor, moist MM  CARDIAC: RRR, S1, S2, no murmur. Radial pulses present and symmetric b/l  PULM: CTA B/L no wheeze, rhonchi, rales.   ABD: soft NT, ND, no rebound no guarding  MSK: Moving all extremities, no edema. 5/5 strength and full ROM in all extremities.     NEURO: No focal neurological deficits, CN 2-12 grossly intact  SKIN: warm, dry, no rash.      HOSPITAL MEDICATIONS:  MEDICATIONS  (STANDING):  apixaban 5 milliGRAM(s) Oral two times a day  chlorhexidine 2% Cloths 1 Application(s) Topical <User Schedule>  dextrose 5%. 1000 milliLiter(s) (50 mL/Hr) IV Continuous <Continuous>  dextrose 5%. 1000 milliLiter(s) (100 mL/Hr) IV Continuous <Continuous>  dextrose 50% Injectable 25 Gram(s) IV Push once  dextrose 50% Injectable 12.5 Gram(s) IV Push once  dextrose Oral Gel 15 Gram(s) Oral once  glucagon  Injectable 1 milliGRAM(s) IntraMuscular once  influenza   Vaccine 0.5 milliLiter(s) IntraMuscular once  insulin glargine Injectable (LANTUS) 18 Unit(s) SubCutaneous at bedtime  insulin lispro (ADMELOG) corrective regimen sliding scale   SubCutaneous three times a day before meals  insulin lispro (ADMELOG) corrective regimen sliding scale   SubCutaneous at bedtime  insulin lispro Injectable (ADMELOG) 5 Unit(s) SubCutaneous three times a day before meals  oseltamivir 75 milliGRAM(s) Oral two times a day    MEDICATIONS  (PRN):      LABS:                        13.1   2.31  )-----------( 90       ( 30 Jan 2025 01:43 )             37.7     Hgb Trend: 13.1<--, 12.7<--, 13.7<--, 15.6<--  01-30    133[L]  |  108[H]  |  9   ----------------------------<  180[H]  4.1   |  15[L]  |  0.61    Ca    7.4[L]      30 Jan 2025 01:43  Phos  3.1     01-30  Mg     2.40     01-30    TPro  5.3[L]  /  Alb  2.9[L]  /  TBili  0.4  /  DBili  x   /  AST  26  /  ALT  12  /  AlkPhos  32[L]  01-30    Creatinine Trend: 0.61<--, 0.65<--, 0.57<--, 0.61<--, 0.60<--, 0.63<--  PT/INR - ( 30 Jan 2025 01:43 )   PT: 12.4 sec;   INR: 1.04 ratio         PTT - ( 30 Jan 2025 01:43 )  PTT:28.8 sec  Urinalysis Basic - ( 30 Jan 2025 01:43 )    Color: x / Appearance: x / SG: x / pH: x  Gluc: 180 mg/dL / Ketone: x  / Bili: x / Urobili: x   Blood: x / Protein: x / Nitrite: x   Leuk Esterase: x / RBC: x / WBC x   Sq Epi: x / Non Sq Epi: x / Bacteria: x        Venous Blood Gas:  01-30 @ 01:43  7.34/32/68/17/95.2  VBG Lactate: 0.8  Venous Blood Gas:  01-29 @ 22:10  7.34/34/44/18/81.8  VBG Lactate: 0.9  Venous Blood Gas:  01-29 @ 17:45  7.32/34/66/18/94.5  VBG Lactate: 1.0  Venous Blood Gas:  01-29 @ 11:21  7.31/32/120/16/99.1  VBG Lactate: 1.0  Venous Blood Gas:  01-29 @ 06:30  7.25/34/59/15/93.2  VBG Lactate: 0.9  Venous Blood Gas:  01-29 @ 02:30  7.21/34/54/14/89.4  VBG Lactate: 1.0  Venous Blood Gas:  01-28 @ 22:20  7.19/32/28/12/58.8  VBG Lactate: 1.0  Venous Blood Gas:  01-28 @ 17:58  7.12/26/34/--/69.1  VBG Lactate: 1.2  Venous Blood Gas:  01-28 @ 14:42  6.98/26/32/6/59.7  VBG Lactate: 1.5  Venous Blood Gas:  01-28 @ 13:32  6.96/29/29/6/55.6  VBG Lactate: 1.7      MICROBIOLOGY:     Culture - Blood (collected 28 Jan 2025 16:13)  Source: .Blood BLOOD  Preliminary Report (29 Jan 2025 20:01):    No growth at 24 hours    Urinalysis with Rflx Culture (collected 28 Jan 2025 15:30)    Culture - Blood (collected 28 Jan 2025 12:45)  Source: .Blood BLOOD  Preliminary Report (29 Jan 2025 20:01):    No growth at 24 hours        RADIOLOGY:  [ ] Reviewed by me   ****Tania Vicenta Internal Medicine PGY-1*****    Overnight Events: no acute overnight events  Interval Events: pt seen and examined. No complaints, pt doing well overall. Denies dysuria, polyuria, abdominal pain, N/V, diarrhea/constipation, headache, fevers/chills.     OBJECTIVE:  ICU Vital Signs Last 24 Hrs  T(C): 36.3 (30 Jan 2025 04:00), Max: 36.3 (29 Jan 2025 12:00)  T(F): 97.3 (30 Jan 2025 04:00), Max: 97.4 (29 Jan 2025 12:00)  HR: 54 (30 Jan 2025 07:00) (51 - 70)  BP: 103/65 (30 Jan 2025 07:00) (94/62 - 126/78)  BP(mean): 76 (30 Jan 2025 07:00) (69 - 90)  ABP: --  ABP(mean): --  RR: 12 (30 Jan 2025 07:00) (11 - 19)  SpO2: 98% (30 Jan 2025 07:00) (96% - 100%)    O2 Parameters below as of 30 Jan 2025 07:00  Patient On (Oxygen Delivery Method): room air              01-29 @ 07:01  -  01-30 @ 07:00  --------------------------------------------------------  IN: 2427.4 mL / OUT: 2000 mL / NET: 427.4 mL      CAPILLARY BLOOD GLUCOSE      POCT Blood Glucose.: 151 mg/dL (29 Jan 2025 21:57)    PHYSICAL EXAM  GEN: NAD. A&Ox3. Non-toxic appearing.  HEENT: normocephalic, atraumatic, EOMI, PERRL, no scleral icterus, no conjunctival pallor, moist MM  CARDIAC: RRR, S1, S2, no murmur. Radial pulses present and symmetric b/l  PULM: CTA B/L no wheeze, rhonchi, rales.   ABD: soft NT, ND, no rebound no guarding  MSK: Moving all extremities, no edema. 5/5 strength and full ROM in all extremities.     NEURO: No focal neurological deficits, CN 2-12 grossly intact  SKIN: warm, dry, no rash.      HOSPITAL MEDICATIONS:  MEDICATIONS  (STANDING):  apixaban 5 milliGRAM(s) Oral two times a day  chlorhexidine 2% Cloths 1 Application(s) Topical <User Schedule>  dextrose 5%. 1000 milliLiter(s) (50 mL/Hr) IV Continuous <Continuous>  dextrose 5%. 1000 milliLiter(s) (100 mL/Hr) IV Continuous <Continuous>  dextrose 50% Injectable 25 Gram(s) IV Push once  dextrose 50% Injectable 12.5 Gram(s) IV Push once  dextrose Oral Gel 15 Gram(s) Oral once  glucagon  Injectable 1 milliGRAM(s) IntraMuscular once  influenza   Vaccine 0.5 milliLiter(s) IntraMuscular once  insulin glargine Injectable (LANTUS) 18 Unit(s) SubCutaneous at bedtime  insulin lispro (ADMELOG) corrective regimen sliding scale   SubCutaneous three times a day before meals  insulin lispro (ADMELOG) corrective regimen sliding scale   SubCutaneous at bedtime  insulin lispro Injectable (ADMELOG) 5 Unit(s) SubCutaneous three times a day before meals  oseltamivir 75 milliGRAM(s) Oral two times a day    MEDICATIONS  (PRN):      LABS:                        13.1   2.31  )-----------( 90       ( 30 Jan 2025 01:43 )             37.7     Hgb Trend: 13.1<--, 12.7<--, 13.7<--, 15.6<--  01-30    133[L]  |  108[H]  |  9   ----------------------------<  180[H]  4.1   |  15[L]  |  0.61    Ca    7.4[L]      30 Jan 2025 01:43  Phos  3.1     01-30  Mg     2.40     01-30    TPro  5.3[L]  /  Alb  2.9[L]  /  TBili  0.4  /  DBili  x   /  AST  26  /  ALT  12  /  AlkPhos  32[L]  01-30    Creatinine Trend: 0.61<--, 0.65<--, 0.57<--, 0.61<--, 0.60<--, 0.63<--  PT/INR - ( 30 Jan 2025 01:43 )   PT: 12.4 sec;   INR: 1.04 ratio         PTT - ( 30 Jan 2025 01:43 )  PTT:28.8 sec  Urinalysis Basic - ( 30 Jan 2025 01:43 )    Color: x / Appearance: x / SG: x / pH: x  Gluc: 180 mg/dL / Ketone: x  / Bili: x / Urobili: x   Blood: x / Protein: x / Nitrite: x   Leuk Esterase: x / RBC: x / WBC x   Sq Epi: x / Non Sq Epi: x / Bacteria: x        Venous Blood Gas:  01-30 @ 01:43  7.34/32/68/17/95.2  VBG Lactate: 0.8  Venous Blood Gas:  01-29 @ 22:10  7.34/34/44/18/81.8  VBG Lactate: 0.9  Venous Blood Gas:  01-29 @ 17:45  7.32/34/66/18/94.5  VBG Lactate: 1.0  Venous Blood Gas:  01-29 @ 11:21  7.31/32/120/16/99.1  VBG Lactate: 1.0  Venous Blood Gas:  01-29 @ 06:30  7.25/34/59/15/93.2  VBG Lactate: 0.9  Venous Blood Gas:  01-29 @ 02:30  7.21/34/54/14/89.4  VBG Lactate: 1.0  Venous Blood Gas:  01-28 @ 22:20  7.19/32/28/12/58.8  VBG Lactate: 1.0  Venous Blood Gas:  01-28 @ 17:58  7.12/26/34/--/69.1  VBG Lactate: 1.2  Venous Blood Gas:  01-28 @ 14:42  6.98/26/32/6/59.7  VBG Lactate: 1.5  Venous Blood Gas:  01-28 @ 13:32  6.96/29/29/6/55.6  VBG Lactate: 1.7      MICROBIOLOGY:     Culture - Blood (collected 28 Jan 2025 16:13)  Source: .Blood BLOOD  Preliminary Report (29 Jan 2025 20:01):    No growth at 24 hours    Urinalysis with Rflx Culture (collected 28 Jan 2025 15:30)    Culture - Blood (collected 28 Jan 2025 12:45)  Source: .Blood BLOOD  Preliminary Report (29 Jan 2025 20:01):    No growth at 24 hours        RADIOLOGY:  [ ] Reviewed by me

## 2025-01-30 NOTE — DIETITIAN INITIAL EVALUATION ADULT - ADD RECOMMEND
- Nursing to please document % PO intake of meals via nutrition flow sheet   - Advise outpatient endocrinology and diabetes educator follow up   - Monitor GI status, electrolytes, glucose

## 2025-01-30 NOTE — PROGRESS NOTE ADULT - ASSESSMENT
The patient is a 34y Female with PMH of DVT and dextrocardia/VSD, presenting with weight loss, polyuria, and polydipsia and found to have new-onset DM c/b severe DKA. Endocrinology consulted for uncontrolled DM complicated by DKA.    #New-Onset Uncontrolled Diabetes Mellitus   #Diabetic Ketoacidosis  - A1C with Estimated Average Glucose Result: 14.4 % (01-28-25). Previous A1c 5.4% in 2022.  - eGFR: 119 mL/min/1.73m2 (01-29-25)  - Weight (kg): 59.1 (01-28-25)  - initial labs with c/w severe DKA--> s/p insulin gtt  - suspect T1DM given patient body habitus, young age, relatively rapid-onset, and family hx of T1DM/autoimmune disease       INPATIENT PLAN:  - Inpatient glucose goals: 100-180       - Continue Lantus 18 units q24 hours ordered tonight for bedtime.        - Increase Admelog to 6 units TID before meals (HOLD if NPO or not eating)       - Continue Low dose admelog correction scale TIDQAC and separate low dose scale QHS  - Please check FSG before meals and QHS, or q6h while NPO  - consistent carb diet when eating  - nutrition consult placed  - Glutamic Acid Decarboxylase antibody, Zinc Transporter 8 Ab, and Islet Antigen (IA-2) Antibody-sent and received 1/28.   - once patient is more stable and less glucotoxic will check c-peptide (send with BMP for glucose)- please send now.   - will consider CT A/P w/ IVC to rule out pancreatic tumor causing new-onset diabetes - hold off for now      DISCHARGE PLANNING:  - Discharge recs pending clinical course and workup above: will require basal/bolus insulin (doses TBD)  -Please send:  - Rx for basal insulin pen-check if lantus is covered if not can try alternative- basaglar, Semglee, toujeo, tresiba  - Rx for bolus insulin pen-check if admelog is covered if not can try alternative-novolog, humalog  - Freestyle Christianne 3 Plus CGM plus reader-. Please send Rx to [facility] to determine coverage.   - Glucometer (ACCU_CHECK iain Connect, Ascensia Contour Next EZ or One, Freestyle [place] LITE or OneTouch Verio IQ)  - Glucometer test strips and lancets  (make sure compatible with glucometer), dispense #100 (or #200) use as directed, alcohol pads  - BD chaan 4mm pen needles  - Glucose tabs, Baqsimi nasal spray or glucagon emergency kit for hypoglycemia risk   - If patient is not covered for CGM, patient should check FSBG premeals and bedtime.   - Patient should call their doctor when FSBG <70 or above >400 and or consistently above 200s as changes in the regimen will have to be made.  - will need RN insulin/glucometer teaching - seen by CDE on 1/30 and demonstrating good understanding  - Endocrinology follow up as follows:   3/18 10am with NP Bree Kincaid   4/23 2pm with JULIANNE Kincaid  6/3 11:20am with Dr. Barriga  Endocrinology [facility]: [address] [place] [address]. [address]. [place] [address]. Tel: [phone]     #Hypocalcemia  - corrected calcium remains mildly low today (8.2), likely dilutional  - trend CMP daily    d/w Dr. Hill.      Velma Zuluaga, [facility]-BC  Nurse Practitioner  Division of Endocrinology  Contact on TEAMS    If out of hospital/unavailable when paged, please note: patient will be cared for by another provider on the endocrine service.  For urgent concerns: call the endocrine answering service for assistance to reach covering provider ([phone]). For non-urgent matters: please email [email or web address] for assistance.

## 2025-01-30 NOTE — PROGRESS NOTE ADULT - SUBJECTIVE AND OBJECTIVE BOX
Chief Complaint: newly diagnosed DM    Interval Events: Pt seen and examined at bedside. Family member at bedside.     MEDICATIONS  (STANDING):  apixaban 5 milliGRAM(s) Oral two times a day  chlorhexidine 2% Cloths 1 Application(s) Topical <User Schedule>  dextrose 5%. 1000 milliLiter(s) (50 mL/Hr) IV Continuous <Continuous>  dextrose 5%. 1000 milliLiter(s) (100 mL/Hr) IV Continuous <Continuous>  dextrose 50% Injectable 25 Gram(s) IV Push once  dextrose 50% Injectable 12.5 Gram(s) IV Push once  dextrose Oral Gel 15 Gram(s) Oral once  glucagon  Injectable 1 milliGRAM(s) IntraMuscular once  influenza   Vaccine 0.5 milliLiter(s) IntraMuscular once  insulin glargine Injectable (LANTUS) 18 Unit(s) SubCutaneous at bedtime  insulin lispro (ADMELOG) corrective regimen sliding scale   SubCutaneous three times a day before meals  insulin lispro (ADMELOG) corrective regimen sliding scale   SubCutaneous at bedtime  insulin lispro Injectable (ADMELOG) 6 Unit(s) SubCutaneous three times a day before meals  oseltamivir 75 milliGRAM(s) Oral two times a day    MEDICATIONS  (PRN):      Allergies    vancomycin (Hives)  amoxicillin (Hives)  penicillin (Hives)    Intolerances      Review of Systems:  Eyes: No blurry vision  Cardiovascular: No chest pain, palpitations  Respiratory: No SOB, no cough  GI: No nausea, vomiting, abdominal pain  : No dysuria    ALL OTHER SYSTEMS REVIEWED AND NEGATIVE    VITALS: T(C): 36.1 (01-30-25 @ 08:00)  T(F): 97 (01-30-25 @ 08:00), Max: 97.3 (01-30-25 @ 04:00)  HR: 66 (01-30-25 @ 13:00) (51 - 70)  BP: 108/70 (01-30-25 @ 13:00) (94/62 - 126/78)  RR:  (11 - 19)  SpO2:  (91% - 99%)  Wt(kg): --      Physical Exam:   GENERAL: NAD, well-developed  EYES: No proptosis  HEENT:  Atraumatic, Normocephalic  RESPIRATORY: non labored breathing   GI: Non distended  PSYCH: Alert, normal affect, normal mood    CAPILLARY BLOOD GLUCOSE    POCT Blood Glucose.: 273 mg/dL (30 Jan 2025 11:51)  POCT Blood Glucose.: 156 mg/dL (30 Jan 2025 07:44)  POCT Blood Glucose.: 151 mg/dL (29 Jan 2025 21:57)  POCT Blood Glucose.: 103 mg/dL (29 Jan 2025 19:01)  POCT Blood Glucose.: 141 mg/dL (29 Jan 2025 18:20)  POCT Blood Glucose.: 156 mg/dL (29 Jan 2025 17:22)  POCT Blood Glucose.: 160 mg/dL (29 Jan 2025 16:12)  POCT Blood Glucose.: 180 mg/dL (29 Jan 2025 15:04)  POCT Blood Glucose.: 159 mg/dL (29 Jan 2025 14:33)      01-30    133[L]  |  108[H]  |  9   ----------------------------<  180[H]  4.1   |  15[L]  |  0.61    eGFR: 119    Ca    7.4[L]      01-30  Mg     2.40     01-30  Phos  3.1     01-30    TPro  5.3[L]  /  Alb  2.9[L]  /  TBili  0.4  /  DBili  x   /  AST  26  /  ALT  12  /  AlkPhos  32[L]  01-30      A1C with Estimated Average Glucose Result: 14.4 % (01-28-25 @ 15:19)      Thyroid Function Tests:  01-28 @ 22:20 TSH 2.17 FreeT4 -- T3 -- Anti TPO -- Anti Thyroglobulin Ab -- TSI --

## 2025-01-30 NOTE — DIETITIAN INITIAL EVALUATION ADULT - PERTINENT LABORATORY DATA
01-30    133[L]  |  108[H]  |  9   ----------------------------<  180[H]  4.1   |  15[L]  |  0.61    Ca    7.4[L]      30 Jan 2025 01:43  Phos  3.1     01-30  Mg     2.40     01-30    CAPILLARY BLOOD GLUCOSE  POCT Blood Glucose.: 273 mg/dL (30 Jan 2025 11:51)  POCT Blood Glucose.: 156 mg/dL (30 Jan 2025 07:44)  POCT Blood Glucose.: 151 mg/dL (29 Jan 2025 21:57)  POCT Blood Glucose.: 103 mg/dL (29 Jan 2025 19:01)  POCT Blood Glucose.: 141 mg/dL (29 Jan 2025 18:20)  POCT Blood Glucose.: 156 mg/dL (29 Jan 2025 17:22)  POCT Blood Glucose.: 160 mg/dL (29 Jan 2025 16:12)  POCT Blood Glucose.: 180 mg/dL (29 Jan 2025 15:04)    A1C with Estimated Average Glucose Result: 14.4 % (01-28-25 @ 15:19)

## 2025-01-31 ENCOUNTER — TRANSCRIPTION ENCOUNTER (OUTPATIENT)
Age: 35
End: 2025-01-31

## 2025-01-31 VITALS
DIASTOLIC BLOOD PRESSURE: 61 MMHG | RESPIRATION RATE: 16 BRPM | OXYGEN SATURATION: 97 % | SYSTOLIC BLOOD PRESSURE: 93 MMHG | HEART RATE: 62 BPM

## 2025-01-31 LAB
ALBUMIN SERPL ELPH-MCNC: 3 G/DL — LOW (ref 3.3–5)
ALP SERPL-CCNC: 42 U/L — SIGNIFICANT CHANGE UP (ref 40–120)
ALT FLD-CCNC: 14 U/L — SIGNIFICANT CHANGE UP (ref 4–33)
ANION GAP SERPL CALC-SCNC: 12 MMOL/L — SIGNIFICANT CHANGE UP (ref 7–14)
APTT BLD: 27.6 SEC — SIGNIFICANT CHANGE UP (ref 24.5–35.6)
AST SERPL-CCNC: 25 U/L — SIGNIFICANT CHANGE UP (ref 4–32)
BASOPHILS # BLD AUTO: 0.02 K/UL — SIGNIFICANT CHANGE UP (ref 0–0.2)
BASOPHILS NFR BLD AUTO: 0.9 % — SIGNIFICANT CHANGE UP (ref 0–2)
BILIRUB SERPL-MCNC: 0.4 MG/DL — SIGNIFICANT CHANGE UP (ref 0.2–1.2)
BUN SERPL-MCNC: 8 MG/DL — SIGNIFICANT CHANGE UP (ref 7–23)
CA-I BLD-SCNC: 1.12 MMOL/L — LOW (ref 1.15–1.29)
CALCIUM SERPL-MCNC: 8.1 MG/DL — LOW (ref 8.4–10.5)
CHLORIDE SERPL-SCNC: 108 MMOL/L — HIGH (ref 98–107)
CLOSURE TME COLL+EPINEP BLD: SIGNIFICANT CHANGE UP K/UL (ref 150–400)
CO2 SERPL-SCNC: 18 MMOL/L — LOW (ref 22–31)
CREAT SERPL-MCNC: 0.52 MG/DL — SIGNIFICANT CHANGE UP (ref 0.5–1.3)
EGFR: 124 ML/MIN/1.73M2 — SIGNIFICANT CHANGE UP
EOSINOPHIL # BLD AUTO: 0.06 K/UL — SIGNIFICANT CHANGE UP (ref 0–0.5)
EOSINOPHIL NFR BLD AUTO: 2.7 % — SIGNIFICANT CHANGE UP (ref 0–6)
GIANT PLATELETS BLD QL SMEAR: PRESENT — SIGNIFICANT CHANGE UP
GLUCOSE BLDC GLUCOMTR-MCNC: 203 MG/DL — HIGH (ref 70–99)
GLUCOSE BLDC GLUCOMTR-MCNC: 230 MG/DL — HIGH (ref 70–99)
GLUCOSE SERPL-MCNC: 254 MG/DL — HIGH (ref 70–99)
HCT VFR BLD CALC: 36.9 % — SIGNIFICANT CHANGE UP (ref 34.5–45)
HCT VFR BLD CALC: 38.1 % — SIGNIFICANT CHANGE UP (ref 34.5–45)
HGB BLD-MCNC: 13 G/DL — SIGNIFICANT CHANGE UP (ref 11.5–15.5)
HGB BLD-MCNC: 13.1 G/DL — SIGNIFICANT CHANGE UP (ref 11.5–15.5)
IANC: 0.88 K/UL — LOW (ref 1.8–7.4)
INR BLD: 0.98 RATIO — SIGNIFICANT CHANGE UP (ref 0.85–1.16)
ISLET CELL512 AB SER-ACNC: SIGNIFICANT CHANGE UP
LYMPHOCYTES # BLD AUTO: 1 K/UL — SIGNIFICANT CHANGE UP (ref 1–3.3)
LYMPHOCYTES # BLD AUTO: 42 % — SIGNIFICANT CHANGE UP (ref 13–44)
MAGNESIUM SERPL-MCNC: 2.1 MG/DL — SIGNIFICANT CHANGE UP (ref 1.6–2.6)
MANUAL SMEAR VERIFICATION: SIGNIFICANT CHANGE UP
MCHC RBC-ENTMCNC: 30.4 PG — SIGNIFICANT CHANGE UP (ref 27–34)
MCHC RBC-ENTMCNC: 30.6 PG — SIGNIFICANT CHANGE UP (ref 27–34)
MCHC RBC-ENTMCNC: 34.4 G/DL — SIGNIFICANT CHANGE UP (ref 32–36)
MCHC RBC-ENTMCNC: 35.2 G/DL — SIGNIFICANT CHANGE UP (ref 32–36)
MCV RBC AUTO: 86.8 FL — SIGNIFICANT CHANGE UP (ref 80–100)
MCV RBC AUTO: 88.4 FL — SIGNIFICANT CHANGE UP (ref 80–100)
MONOCYTES # BLD AUTO: 0.17 K/UL — SIGNIFICANT CHANGE UP (ref 0–0.9)
MONOCYTES NFR BLD AUTO: 7.1 % — SIGNIFICANT CHANGE UP (ref 2–14)
NEUTROPHILS # BLD AUTO: 1.04 K/UL — LOW (ref 1.8–7.4)
NEUTROPHILS NFR BLD AUTO: 35.7 % — LOW (ref 43–77)
NEUTS BAND # BLD: 8 % — HIGH (ref 0–6)
NEUTS BAND NFR BLD: 8 % — HIGH (ref 0–6)
NRBC # BLD AUTO: 0 K/UL — SIGNIFICANT CHANGE UP (ref 0–0)
NRBC # BLD: 0 /100 WBCS — SIGNIFICANT CHANGE UP (ref 0–0)
NRBC # FLD: 0 K/UL — SIGNIFICANT CHANGE UP (ref 0–0)
NRBC BLD-RTO: 0 /100 WBCS — SIGNIFICANT CHANGE UP (ref 0–0)
PHOSPHATE SERPL-MCNC: 2.4 MG/DL — LOW (ref 2.5–4.5)
PLAT MORPH BLD: NORMAL — SIGNIFICANT CHANGE UP
PLATELET # BLD AUTO: 77 K/UL — LOW (ref 150–400)
PLATELET # BLD AUTO: 96 K/UL — LOW (ref 150–400)
PLATELET COUNT - ESTIMATE: ABNORMAL
POTASSIUM SERPL-MCNC: 3.4 MMOL/L — LOW (ref 3.5–5.3)
POTASSIUM SERPL-SCNC: 3.4 MMOL/L — LOW (ref 3.5–5.3)
PROT SERPL-MCNC: 5.3 G/DL — LOW (ref 6–8.3)
PROTHROM AB SERPL-ACNC: 11.7 SEC — SIGNIFICANT CHANGE UP (ref 9.9–13.4)
RBC # BLD: 4.25 M/UL — SIGNIFICANT CHANGE UP (ref 3.8–5.2)
RBC # BLD: 4.31 M/UL — SIGNIFICANT CHANGE UP (ref 3.8–5.2)
RBC # FLD: 14.5 % — SIGNIFICANT CHANGE UP (ref 10.3–14.5)
RBC # FLD: 14.9 % — HIGH (ref 10.3–14.5)
RBC BLD AUTO: NORMAL — SIGNIFICANT CHANGE UP
SMUDGE CELLS # BLD: PRESENT — SIGNIFICANT CHANGE UP
SODIUM SERPL-SCNC: 138 MMOL/L — SIGNIFICANT CHANGE UP (ref 135–145)
VARIANT LYMPHS # BLD: 3.6 % — SIGNIFICANT CHANGE UP (ref 0–6)
VARIANT LYMPHS NFR BLD MANUAL: 3.6 % — SIGNIFICANT CHANGE UP (ref 0–6)
WBC # BLD: 2.3 K/UL — LOW (ref 3.8–10.5)
WBC # BLD: 2.37 K/UL — LOW (ref 3.8–10.5)
WBC # FLD AUTO: 2.3 K/UL — LOW (ref 3.8–10.5)
WBC # FLD AUTO: 2.37 K/UL — LOW (ref 3.8–10.5)

## 2025-01-31 PROCEDURE — 99233 SBSQ HOSP IP/OBS HIGH 50: CPT | Mod: GC

## 2025-01-31 PROCEDURE — 99232 SBSQ HOSP IP/OBS MODERATE 35: CPT

## 2025-01-31 RX ORDER — INSULIN LISPRO 100/ML
8 VIAL (ML) SUBCUTANEOUS
Qty: 2 | Refills: 3
Start: 2025-01-31

## 2025-01-31 RX ORDER — ISOPROPYL ALCOHOL, BENZOCAINE .7; .06 ML/ML; ML/ML
0 SWAB TOPICAL
Qty: 100 | Refills: 1
Start: 2025-01-31

## 2025-01-31 RX ORDER — GLUCAGON 3 MG/1
1 POWDER NASAL
Qty: 1 | Refills: 0
Start: 2025-01-31

## 2025-01-31 RX ORDER — OSELTAMIVIR PHOSPHATE 75 MG/1
1 CAPSULE ORAL
Qty: 4 | Refills: 0
Start: 2025-01-31 | End: 2025-02-01

## 2025-01-31 RX ORDER — INSULIN GLARGINE-YFGN 100 [IU]/ML
20 INJECTION, SOLUTION SUBCUTANEOUS
Qty: 0 | Refills: 0 | DISCHARGE
Start: 2025-01-31

## 2025-01-31 RX ORDER — INSULIN LISPRO 100/ML
6 VIAL (ML) SUBCUTANEOUS
Qty: 2 | Refills: 3
Start: 2025-01-31 | End: 2025-05-30

## 2025-01-31 RX ORDER — POTASSIUM CHLORIDE 750 MG/1
40 TABLET, EXTENDED RELEASE ORAL ONCE
Refills: 0 | Status: COMPLETED | OUTPATIENT
Start: 2025-01-31 | End: 2025-01-31

## 2025-01-31 RX ORDER — INSULIN LISPRO 100/ML
6 VIAL (ML) SUBCUTANEOUS
Qty: 2 | Refills: 3
Start: 2025-01-31

## 2025-01-31 RX ORDER — POTASSIUM CHLORIDE 750 MG/1
40 TABLET, EXTENDED RELEASE ORAL ONCE
Refills: 0 | Status: DISCONTINUED | OUTPATIENT
Start: 2025-01-31 | End: 2025-01-31

## 2025-01-31 RX ORDER — SODIUM PHOSPHATE, DIBASIC, ANHYDROUS, POTASSIUM PHOSPHATE, MONOBASIC, AND SODIUM PHOSPHATE, MONOBASIC, MONOHYDRATE 852; 155; 130 MG/1; MG/1; MG/1
2 TABLET, COATED ORAL
Refills: 0 | Status: COMPLETED | OUTPATIENT
Start: 2025-01-31 | End: 2025-01-31

## 2025-01-31 RX ORDER — INSULIN GLARGINE-YFGN 100 [IU]/ML
20 INJECTION, SOLUTION SUBCUTANEOUS AT BEDTIME
Refills: 0 | Status: DISCONTINUED | OUTPATIENT
Start: 2025-01-31 | End: 2025-01-31

## 2025-01-31 RX ORDER — INSULIN GLARGINE-YFGN 100 [IU]/ML
18 INJECTION, SOLUTION SUBCUTANEOUS
Qty: 2 | Refills: 3
Start: 2025-01-31 | End: 2025-05-30

## 2025-01-31 RX ORDER — INSULIN GLARGINE-YFGN 100 [IU]/ML
20 INJECTION, SOLUTION SUBCUTANEOUS
Qty: 2 | Refills: 3
Start: 2025-01-31 | End: 2025-05-30

## 2025-01-31 RX ORDER — INSULIN LISPRO 100/ML
8 VIAL (ML) SUBCUTANEOUS
Refills: 0 | Status: DISCONTINUED | OUTPATIENT
Start: 2025-01-31 | End: 2025-01-31

## 2025-01-31 RX ORDER — APIXABAN 5 MG/1
1 TABLET, FILM COATED ORAL
Qty: 60 | Refills: 0
Start: 2025-01-31 | End: 2025-03-01

## 2025-01-31 RX ADMIN — SODIUM PHOSPHATE, DIBASIC, ANHYDROUS, POTASSIUM PHOSPHATE, MONOBASIC, AND SODIUM PHOSPHATE, MONOBASIC, MONOHYDRATE 2 TABLET(S): 852; 155; 130 TABLET, COATED ORAL at 02:37

## 2025-01-31 RX ADMIN — Medication 2: at 07:53

## 2025-01-31 RX ADMIN — POTASSIUM CHLORIDE 40 MILLIEQUIVALENT(S): 750 TABLET, EXTENDED RELEASE ORAL at 10:35

## 2025-01-31 RX ADMIN — Medication 6 UNIT(S): at 07:56

## 2025-01-31 RX ADMIN — APIXABAN 5 MILLIGRAM(S): 5 TABLET, FILM COATED ORAL at 05:08

## 2025-01-31 RX ADMIN — Medication 2: at 11:51

## 2025-01-31 RX ADMIN — Medication 8 UNIT(S): at 11:55

## 2025-01-31 RX ADMIN — ANTISEPTIC SURGICAL SCRUB 1 APPLICATION(S): 0.04 SOLUTION TOPICAL at 05:08

## 2025-01-31 RX ADMIN — OSELTAMIVIR PHOSPHATE 75 MILLIGRAM(S): 75 CAPSULE ORAL at 05:08

## 2025-01-31 RX ADMIN — POTASSIUM CHLORIDE 40 MILLIEQUIVALENT(S): 750 TABLET, EXTENDED RELEASE ORAL at 05:42

## 2025-01-31 RX ADMIN — SODIUM PHOSPHATE, DIBASIC, ANHYDROUS, POTASSIUM PHOSPHATE, MONOBASIC, AND SODIUM PHOSPHATE, MONOBASIC, MONOHYDRATE 2 TABLET(S): 852; 155; 130 TABLET, COATED ORAL at 04:11

## 2025-01-31 NOTE — PROGRESS NOTE ADULT - SUBJECTIVE AND OBJECTIVE BOX
Chief Complaint: T1DM --COLLEEN being worked up    Interval Events: Pt seen and examined at bedside. Pt tolerating carb consistent diet with no nausea, no vomiting. Discussed low c-peptide and informed patient that her antibody screen is pending. Gave patient her follow up appt information.     MEDICATIONS  (STANDING):  apixaban 5 milliGRAM(s) Oral two times a day  chlorhexidine 2% Cloths 1 Application(s) Topical <User Schedule>  dextrose 5%. 1000 milliLiter(s) (50 mL/Hr) IV Continuous <Continuous>  dextrose 5%. 1000 milliLiter(s) (100 mL/Hr) IV Continuous <Continuous>  dextrose 50% Injectable 25 Gram(s) IV Push once  dextrose 50% Injectable 12.5 Gram(s) IV Push once  dextrose Oral Gel 15 Gram(s) Oral once  glucagon  Injectable 1 milliGRAM(s) IntraMuscular once  influenza   Vaccine 0.5 milliLiter(s) IntraMuscular once  insulin glargine Injectable (LANTUS) 18 Unit(s) SubCutaneous at bedtime  insulin lispro (ADMELOG) corrective regimen sliding scale   SubCutaneous three times a day before meals  insulin lispro (ADMELOG) corrective regimen sliding scale   SubCutaneous at bedtime  insulin lispro Injectable (ADMELOG) 8 Unit(s) SubCutaneous three times a day before meals  oseltamivir 75 milliGRAM(s) Oral two times a day    MEDICATIONS  (PRN):      Allergies    vancomycin (Hives)  amoxicillin (Hives)  penicillin (Hives)    Intolerances      Review of Systems:  Eyes: No blurry vision  Cardiovascular: No chest pain, palpitations  Respiratory: No SOB, no cough  GI: No nausea, vomiting, abdominal pain  : No dysuria    ALL OTHER SYSTEMS REVIEWED AND NEGATIVE      VITALS: T(C): 36 (01-31-25 @ 08:00)  T(F): 96.8 (01-31-25 @ 08:00), Max: 97.5 (01-31-25 @ 00:00)  HR: 53 (01-31-25 @ 12:00) (42 - 64)  BP: 95/63 (01-31-25 @ 12:00) (82/50 - 119/82)  RR:  (11 - 21)  SpO2:  (95% - 100%)  Wt(kg): --      Physical Exam:   GENERAL: NAD, well-developed  EYES: No proptosis  HEENT:  Atraumatic, Normocephalic  RESPIRATORY: non labored breathing   GI: Non distended  PSYCH: Alert, normal affect, normal mood    CAPILLARY BLOOD GLUCOSE    POCT Blood Glucose.: 230 mg/dL (31 Jan 2025 11:47)  POCT Blood Glucose.: 203 mg/dL (31 Jan 2025 07:51)  POCT Blood Glucose.: 256 mg/dL (30 Jan 2025 21:56)  POCT Blood Glucose.: 189 mg/dL (30 Jan 2025 16:33)      01-31    138  |  108[H]  |  8   ----------------------------<  254[H]  3.4[L]   |  18[L]  |  0.52    eGFR: 124    Ca    8.1[L]      01-31  Mg     2.10     01-31  Phos  2.4     01-31    TPro  5.3[L]  /  Alb  3.0[L]  /  TBili  0.4  /  DBili  x   /  AST  25  /  ALT  14  /  AlkPhos  42  01-31      A1C with Estimated Average Glucose Result: 14.4 % (01-28-25 @ 15:19)      Thyroid Function Tests:  01-28 @ 22:20 TSH 2.17 FreeT4 -- T3 -- Anti TPO -- Anti Thyroglobulin Ab -- TSI --

## 2025-01-31 NOTE — PROGRESS NOTE ADULT - SUPERVISING ATTENDING
Pt seen over teams with PA.   Agree with above note.   Hold eliquis for low plts, repeat on Monday. If plts increase restart eliquis.  Follow u p with [facility] next week as outpt.

## 2025-01-31 NOTE — ADVANCED PRACTICE NURSE CONSULT - REASON FOR CONSULT
Pt is a 33 y/o F admitted for DKA was placed on an insulin drip. Pt is in the ICU insulin drip turned off last evening, Patient is newly diagnosed diabetes, possibly COLLEEN/type 1. Patient has no PMH diabetes, but her mother has type 1 diabetes. [facility]
Pt is a 33 y/o F admitted for DKA was placed on an insulin drip. Pt is in the ICU insulin drip turned off last evening, Patient is newly diagnosed diabetes, possibly COLLEEN/type 1. Patient has no PMH diabetes, but her mother has type 1 diabetes. [facility]

## 2025-01-31 NOTE — DISCHARGE NOTE NURSING/CASE MANAGEMENT/SOCIAL WORK - NURSING SECTION COMPLETE
Bedside and verbal shift report given by Zaid Gallegos RN (off going nurse) to Sunny Hines RN (oncoming nurse). Report included the following information SBAR and ED Summary. Patient/Caregiver provided printed discharge information.

## 2025-01-31 NOTE — PROGRESS NOTE ADULT - ATTENDING COMMENTS
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

## 2025-01-31 NOTE — DISCHARGE NOTE NURSING/CASE MANAGEMENT/SOCIAL WORK - FINANCIAL ASSISTANCE
[facility] provides services at a reduced cost to those who are determined to be eligible through [facility]’s financial assistance program. Information regarding [facility]’s financial assistance program can be found by going to [email or web address] or by calling [phone].

## 2025-01-31 NOTE — DISCHARGE NOTE PROVIDER - ATTENDING ATTESTATION STATEMENT
I have personally seen and examined the patient. I have collaborated with and supervised the Pt OOB in chair + alarm, +R UE sling pillow for comfort, CBIR, NAD, RN aware.

## 2025-01-31 NOTE — PROGRESS NOTE ADULT - ASSESSMENT
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)  - No active issues, sees pediatric cardiology (last seen 1/17/23)  - cardiology consulted-  Echocardiogram with normal biventricular function, unobstructed Jordan and Fontan, mild MR/TR,  no acute issues.   - full AC started with Eliquis  - f/u pediatric echocardiogram  - continue tele monitoring  - Plan for cardiac MRI as an outpatient.   - Outpatient CPET  - outpatient holter monitor      ======RESPIRATORY=====  - No active issues, pt on RA      =====GI/NUTRITION=====  - No active issues      =====/RENAL=====  - No active issues      =====INFECTIOUS DISEASE=====  #Influenza A  - Start oseltamivir 75 BID 5 days (1/28-2/1)      =====ENDOCRINE=====  #DKA  #Suspect COLLEEN  - Pt not on any medications at home, no previous diagnosis of DM  - High anion gap metabolic acidosis with elevated BHB c/w DKA  - A1C 14.4  - Start DKA protocol, transitioned 1/29  - D5 LR   - BMP q4h  - endocrinology consult  - will check COLLEEN labs; zinc transporter antibody, glutamic acid decarboxylase antibody, Islet antigen (IA-2) antibody      =====HEMATOLOGIC/DVT PPX=====  - No active issues    - DVT PPX: Eliquis      =====ETHICS=====  - Code status: full code  - Dispo: remain in MICU 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       =====NEUROLOGICAL=====  -No active issues, pt AOx4      =====CARDIOVASCULAR=====  Pt has history of congenital heart defects (dextrocardia, VSD, DORV, VSD, straddling AV valve, 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)  - No active issues, sees pediatric cardiology (last seen 1/17/23)  - cardiology consulted-  Echocardiogram with normal biventricular function, unobstructed Jordan and Fontan, mild MR/TR,  no acute issues.   - full AC started with Eliquis  - f/u pediatric echocardiogram  - continue tele monitoring  - Plan for cardiac MRI as an outpatient.   - Outpatient CPET  - outpatient holter monitor      ======RESPIRATORY=====  - No active issues, pt on RA      =====GI/NUTRITION=====  - No active issues      =====/RENAL=====  - No active issues      =====INFECTIOUS DISEASE=====  #Influenza A  - Start oseltamivir 75 BID 5 days (1/28-2/1)      =====ENDOCRINE=====  #DKA  #Suspect COLLEEN  - Pt not on any medications at home, no previous diagnosis of DM  - High anion gap metabolic acidosis with elevated BHB c/w DKA  - A1C 14.4  - Start DKA protocol, transitioned 1/29  - D5 LR   - BMP q4h  - endocrinology consult  - will check COLLEEN labs; zinc transporter antibody, glutamic acid decarboxylase antibody, Islet antigen (IA-2) antibody      =====HEMATOLOGIC/DVT PPX=====  - No active issues    - DVT PPX: Eliquis      =====ETHICS=====  - Code status: full code  - Dispo: remain in MICU

## 2025-01-31 NOTE — PROGRESS NOTE ADULT - SUBJECTIVE AND OBJECTIVE BOX
****[facility] PGY-1*****    Overnight Events: no acute overnight events  Interval Events:    OBJECTIVE:  ICU Vital Signs Last 24 Hrs  T(C): 36.2 (31 Jan 2025 04:00), Max: 36.4 (31 Jan 2025 00:00)  T(F): 97.2 (31 Jan 2025 04:00), Max: 97.5 (31 Jan 2025 00:00)  HR: 42 (31 Jan 2025 07:00) (42 - 67)  BP: 82/50 (31 Jan 2025 07:00) (82/50 - 119/82)  BP(mean): 59 (31 Jan 2025 07:00) (59 - 617)  ABP: --  ABP(mean): --  RR: 14 (31 Jan 2025 07:00) (11 - 22)  SpO2: 97% (31 Jan 2025 07:00) (91% - 100%)    O2 Parameters below as of 31 Jan 2025 07:00  Patient On (Oxygen Delivery Method): room air              01-30 @ 07:01  -  01-31 @ 07:00  --------------------------------------------------------  IN: 1240 mL / OUT: 800 mL / NET: 440 mL      CAPILLARY BLOOD GLUCOSE      POCT Blood Glucose.: 203 mg/dL (31 Jan 2025 07:51)      PHYSICAL EXAM  GEN: NAD. A&Ox3. Non-toxic appearing.  HEENT: normocephalic, atraumatic, EOMI, PERRL, no scleral icterus, no conjunctival pallor, moist MM  CARDIAC: RRR, S1, S2, no murmur. Radial pulses present and symmetric b/l  PULM: CTA B/L no wheeze, rhonchi, rales.   ABD: soft NT, ND, no rebound no guarding  MSK: Moving all extremities, no edema. 5/5 strength and full ROM in all extremities.     NEURO: No focal neurological deficits, CN 2-12 grossly intact  SKIN: warm, dry, no rash.      HOSPITAL MEDICATIONS:  MEDICATIONS  (STANDING):  apixaban 5 milliGRAM(s) Oral two times a day  chlorhexidine 2% Cloths 1 Application(s) Topical <User Schedule>  dextrose 5%. 1000 milliLiter(s) (50 mL/Hr) IV Continuous <Continuous>  dextrose 5%. 1000 milliLiter(s) (100 mL/Hr) IV Continuous <Continuous>  dextrose 50% Injectable 25 Gram(s) IV Push once  dextrose 50% Injectable 12.5 Gram(s) IV Push once  dextrose Oral Gel 15 Gram(s) Oral once  glucagon  Injectable 1 milliGRAM(s) IntraMuscular once  influenza   Vaccine 0.5 milliLiter(s) IntraMuscular once  insulin glargine Injectable (LANTUS) 18 Unit(s) SubCutaneous at bedtime  insulin lispro (ADMELOG) corrective regimen sliding scale   SubCutaneous three times a day before meals  insulin lispro (ADMELOG) corrective regimen sliding scale   SubCutaneous at bedtime  insulin lispro Injectable (ADMELOG) 6 Unit(s) SubCutaneous three times a day before meals  oseltamivir 75 milliGRAM(s) Oral two times a day    MEDICATIONS  (PRN):      LABS:                        13.0   2.37  )-----------( 77       ( 31 Jan 2025 00:18 )             36.9     Hgb Trend: 13.0<--, 13.1<--, 12.7<--, 13.7<--, 15.6<--  01-31    138  |  108[H]  |  8   ----------------------------<  254[H]  3.4[L]   |  18[L]  |  0.52    Ca    8.1[L]      31 Jan 2025 00:18  Phos  2.4     01-31  Mg     2.10     01-31    TPro  5.3[L]  /  Alb  3.0[L]  /  TBili  0.4  /  DBili  x   /  AST  25  /  ALT  14  /  AlkPhos  42  01-31    Creatinine Trend: 0.52<--, 0.61<--, 0.65<--, 0.57<--, 0.61<--, 0.60<--  PT/INR - ( 31 Jan 2025 00:18 )   PT: 11.7 sec;   INR: 0.98 ratio         PTT - ( 31 Jan 2025 00:18 )  PTT:27.6 sec  Urinalysis Basic - ( 31 Jan 2025 00:18 )    Color: x / Appearance: x / SG: x / pH: x  Gluc: 254 mg/dL / Ketone: x  / Bili: x / Urobili: x   Blood: x / Protein: x / Nitrite: x   Leuk Esterase: x / RBC: x / WBC x   Sq Epi: x / Non Sq Epi: x / Bacteria: x        Venous Blood Gas:  01-30 @ 01:43  7.34/32/68/17/95.2  VBG Lactate: 0.8  Venous Blood Gas:  01-29 @ 22:10  7.34/34/44/18/81.8  VBG Lactate: 0.9  Venous Blood Gas:  01-29 @ 17:45  7.32/34/66/18/94.5  VBG Lactate: 1.0  Venous Blood Gas:  01-29 @ 11:21  7.31/32/120/16/99.1  VBG Lactate: 1.0      MICROBIOLOGY:     Culture - Blood (collected 28 Jan 2025 16:13)  Source: .Blood BLOOD  Preliminary Report (30 Jan 2025 20:01):    No growth at 48 Hours    Urinalysis with Rflx Culture (collected 28 Jan 2025 15:30)    Culture - Blood (collected 28 Jan 2025 12:45)  Source: .Blood BLOOD  Preliminary Report (30 Jan 2025 20:01):    No growth at 48 Hours        RADIOLOGY:  [ ] Reviewed by me   ****Tania Yad[facility] Internal Medicine PGY-1*****    Overnight Events: no acute overnight events  Interval Events: pt seen and examined at bedside. Pt doing well overall, no complaints. Denies dysuria, polyuria, abdominal pain, N/V, diarrhea/constipation, headache, fevers/chills.     OBJECTIVE:  ICU Vital Signs Last 24 Hrs  T(C): 36.2 (31 Jan 2025 04:00), Max: 36.4 (31 Jan 2025 00:00)  T(F): 97.2 (31 Jan 2025 04:00), Max: 97.5 (31 Jan 2025 00:00)  HR: 42 (31 Jan 2025 07:00) (42 - 67)  BP: 82/50 (31 Jan 2025 07:00) (82/50 - 119/82)  BP(mean): 59 (31 Jan 2025 07:00) (59 - 617)  ABP: --  ABP(mean): --  RR: 14 (31 Jan 2025 07:00) (11 - 22)  SpO2: 97% (31 Jan 2025 07:00) (91% - 100%)    O2 Parameters below as of 31 Jan 2025 07:00  Patient On (Oxygen Delivery Method): room air              01-30 @ 07:01  -  01-31 @ 07:00  --------------------------------------------------------  IN: 1240 mL / OUT: 800 mL / NET: 440 mL      CAPILLARY BLOOD GLUCOSE      POCT Blood Glucose.: 203 mg/dL (31 Jan 2025 07:51)      PHYSICAL EXAM  GEN: NAD. A&Ox3. Non-toxic appearing.  HEENT: normocephalic, atraumatic, EOMI, PERRL, no scleral icterus, no conjunctival pallor, moist MM  CARDIAC: RRR, S1, S2, no murmur. Radial pulses present and symmetric b/l  PULM: CTA B/L no wheeze, rhonchi, rales.   ABD: soft NT, ND, no rebound no guarding  MSK: Moving all extremities, no edema. 5/5 strength and full ROM in all extremities.     NEURO: No focal neurological deficits, CN 2-12 grossly intact  SKIN: warm, dry, no rash.      HOSPITAL MEDICATIONS:  MEDICATIONS  (STANDING):  apixaban 5 milliGRAM(s) Oral two times a day  chlorhexidine 2% Cloths 1 Application(s) Topical <User Schedule>  dextrose 5%. 1000 milliLiter(s) (50 mL/Hr) IV Continuous <Continuous>  dextrose 5%. 1000 milliLiter(s) (100 mL/Hr) IV Continuous <Continuous>  dextrose 50% Injectable 25 Gram(s) IV Push once  dextrose 50% Injectable 12.5 Gram(s) IV Push once  dextrose Oral Gel 15 Gram(s) Oral once  glucagon  Injectable 1 milliGRAM(s) IntraMuscular once  influenza   Vaccine 0.5 milliLiter(s) IntraMuscular once  insulin glargine Injectable (LANTUS) 18 Unit(s) SubCutaneous at bedtime  insulin lispro (ADMELOG) corrective regimen sliding scale   SubCutaneous three times a day before meals  insulin lispro (ADMELOG) corrective regimen sliding scale   SubCutaneous at bedtime  insulin lispro Injectable (ADMELOG) 6 Unit(s) SubCutaneous three times a day before meals  oseltamivir 75 milliGRAM(s) Oral two times a day    MEDICATIONS  (PRN):      LABS:                        13.0   2.37  )-----------( 77       ( 31 Jan 2025 00:18 )             36.9     Hgb Trend: 13.0<--, 13.1<--, 12.7<--, 13.7<--, 15.6<--  01-31    138  |  108[H]  |  8   ----------------------------<  254[H]  3.4[L]   |  18[L]  |  0.52    Ca    8.1[L]      31 Jan 2025 00:18  Phos  2.4     01-31  Mg     2.10     01-31    TPro  5.3[L]  /  Alb  3.0[L]  /  TBili  0.4  /  DBili  x   /  AST  25  /  ALT  14  /  AlkPhos  42  01-31    Creatinine Trend: 0.52<--, 0.61<--, 0.65<--, 0.57<--, 0.61<--, 0.60<--  PT/INR - ( 31 Jan 2025 00:18 )   PT: 11.7 sec;   INR: 0.98 ratio         PTT - ( 31 Jan 2025 00:18 )  PTT:27.6 sec  Urinalysis Basic - ( 31 Jan 2025 00:18 )    Color: x / Appearance: x / SG: x / pH: x  Gluc: 254 mg/dL / Ketone: x  / Bili: x / Urobili: x   Blood: x / Protein: x / Nitrite: x   Leuk Esterase: x / RBC: x / WBC x   Sq Epi: x / Non Sq Epi: x / Bacteria: x        Venous Blood Gas:  01-30 @ 01:43  7.34/32/68/17/95.2  VBG Lactate: 0.8  Venous Blood Gas:  01-29 @ 22:10  7.34/34/44/18/81.8  VBG Lactate: 0.9  Venous Blood Gas:  01-29 @ 17:45  7.32/34/66/18/94.5  VBG Lactate: 1.0  Venous Blood Gas:  01-29 @ 11:21  7.31/32/120/16/99.1  VBG Lactate: 1.0      MICROBIOLOGY:     Culture - Blood (collected 28 Jan 2025 16:13)  Source: .Blood BLOOD  Preliminary Report (30 Jan 2025 20:01):    No growth at 48 Hours    Urinalysis with Rflx Culture (collected 28 Jan 2025 15:30)    Culture - Blood (collected 28 Jan 2025 12:45)  Source: .Blood BLOOD  Preliminary Report (30 Jan 2025 20:01):    No growth at 48 Hours        RADIOLOGY:  [ ] Reviewed by me

## 2025-01-31 NOTE — PROGRESS NOTE ADULT - TIME BILLING
reviewing chart and coordinating care with primary team/staff, as well as reviewing vitals, radiology, medication list, recent labs, and prior records.
reviewing chart and coordinating care with primary team/staff, as well as reviewing vitals, radiology, medication list, recent labs, and prior records.

## 2025-01-31 NOTE — DISCHARGE NOTE PROVIDER - NSDCMRMEDTOKEN_GEN_ALL_CORE_FT
Admelog SoloStar 100 units/mL injectable solution: 6 unit(s) subcutaneous 3 times a day (before meals) 6 unit(s) subcutaneous 3 times a day (with meals)  alcohol swabs: Apply topically to affected area 4 times a day  Freestyle Christianne 3 [place]: Dispense 1 [place]  Freestyle Christianne 3 Sensors: Please change every 14 days  glucometer (per patient&#x27;s insurance): Test blood sugars four times a day. Dispense #1 glucometer.  Insulin Pen Needles, 4mm: 1 application subcutaneously 4 times a day. ** Use with insulin pen **  lancets: 1 application subcutaneously 4 times a day  Lantus Solostar Pen 100 units/mL subcutaneous solution: 18 unit(s) subcutaneous once a day (at bedtime) 18 unit(s) subcutaneous once a day  test strips (per patient&#x27;s insurance): 1 application subcutaneously 4 times a day. ** Compatible with patient&#x27;s glucometer **   Admelog SoloStar 100 units/mL injectable solution: 6 unit(s) subcutaneous 3 times a day (before meals) 6 unit(s) subcutaneous 3 times a day (with meals)  alcohol swabs: Apply topically to affected area 4 times a day  Dexcom G7 : Use as directed  Dexcom G7 Sensors: Change every 10 days  Eliquis 5 mg oral tablet: 1 tab(s) orally 2 times a day  Freestyle Christianne 3 [place]: Dispense 1 [place]  Freestyle Christianne 3 Sensors: Please change every 14 days  Glucagon Emergency Kit for Low Blood Sugar 1 mg injection: 1 milligram(s) intramuscular once a day as needed for hypoglycemia 1 milligram(s) intramuscular once as needed for severe hypoglycemia, then call 911.  glucometer (per patient&#x27;s insurance): Test blood sugars four times a day. Dispense #1 glucometer.  HumaLOG KwikPen 100 units/mL injectable solution: 8 unit(s) subcutaneous 3 times a day (before meals) 6 unit(s) subcutaneous 3 times a day (with meals)  Insulin Pen Needles, 4mm: 1 application subcutaneously 4 times a day. ** Use with insulin pen **  lancets: 1 application subcutaneously 4 times a day  Lantus Solostar Pen 100 units/mL subcutaneous solution: 20 unit(s) subcutaneous once a day (at bedtime) 18 unit(s) subcutaneous once a day  test strips (per patient&#x27;s insurance): 1 application subcutaneously 4 times a day. ** Compatible with patient&#x27;s glucometer **   alcohol swabs: Apply topically to affected area 4 times a day  Dexcom G7 Sensors: Change every 10 days  Eliquis 5 mg oral tablet: 1 tab(s) orally 2 times a day DO NOT TAKE UNTIL YOU FOLLOW UP WITH DR. CARDOSO  Glucagon Emergency Kit for Low Blood Sugar 1 mg injection: 1 milligram(s) intramuscular once a day as needed for hypoglycemia 1 milligram(s) intramuscular once as needed for severe hypoglycemia, then call 911.  HumaLOG KwikPen 100 units/mL injectable solution: 8 unit(s) subcutaneous 3 times a day (before meals) 6 unit(s) subcutaneous 3 times a day (with meals)  Insulin Pen Needles, 4mm: 1 application subcutaneously 4 times a day. ** Use with insulin pen **  lancets: 1 application subcutaneously 4 times a day  Lantus 100 units/mL subcutaneous solution: 20 unit(s) subcutaneous once a day (at bedtime)  oseltamivir 75 mg oral capsule: 1 cap(s) orally 2 times a day  test strips (per patient&#x27;s insurance): 1 application subcutaneously 4 times a day. ** Compatible with patient&#x27;s glucometer **

## 2025-01-31 NOTE — DISCHARGE NOTE NURSING/CASE MANAGEMENT/SOCIAL WORK - PATIENT PORTAL LINK FT
You can access the FollowMyHealth Patient Portal offered by [facility] by registering at the following website: [email or web address]. By joining [facility]’s FollowMyHealth portal, you will also be able to view your health information using other applications (apps) compatible with our system.

## 2025-01-31 NOTE — PROGRESS NOTE ADULT - ASSESSMENT
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 Nemesio hasn't been followed by [facility] since 01/2023 because she just hasn't been able to make an appointment. Prior to this admission she was doing well from a cardiac perspective, exercising multiple times a week with no symptoms. She also has not taken Eliquis in over a year, but had no particular reason for discontinuing it. Starting a week or two ago she noticed she was having increased thirst, increased urinary frequency, skin flushing/itchiness, and fatigue. Then a few days ago she also began experiencing increased shortness of breath with exertion, so she came to the ED to be evaluated. She was found upon arrival to be Flu A positive, and in DKA with pH 6.96, bicarb <7, anion gap 23, BG 300s, beta hydroxybutyrate 8.1. She is currently in the ICU on IV fluids and insulin gtt. The ED also performed a CTA chest to r/o PE, however it was not timed correctly for her congenital heart disease, and had scattered filling defects throughout the pulmonary arteries. She is feeling better today, saturating well on room air. Nemesio also does note she recently had a 5 hour drive, where she did not get up and move around much. Nemesio denies any active fevers, chills, CP, palpitations, syncope, near syncope, leg swelling, abdominal pain, N/V/D, headache, or dizziness.     1. Dextrocardia with DORV, VSD, straddling AV valve, L-transposed great arteries, Ventricular inversion s/p bidirectional Jordan and non-fenestrated lateral Fontan  - Echocardiogram with normal biventricular function, unobstructed Jordan and Fontan, mild MR/TR,  no acute issues.   - Euvolemic on exam.  - Continue telemetry monitoring.  - Plan for cardiac MRI as an outpatient.   - Outpatient CPET.    2. Atrial Rhythm c/b spinal embolism.   - CT scan with some filling defects, but not timed correctly in setting of congenital heart disease.   - Patient with hx of hypercoagulability with spinal embolism.   - Continue Eliquis 5mg PO BID.  - LE venous duplex negative for DVT.   - Still bradycardic with sleep, but no signs of chronotropic incompetence.    - Outpatient holter monitor.     3. Thrombocytopenia  - In setting of IV fluid administration.   - Repeat CBC, if platelets still downtrending, would consult Heme/Onc.   - Needs to be maintained on Eliquis.       4. DKA  - Management per Endocrinology and Medicine teams.   - Continue insulin.

## 2025-01-31 NOTE — PROGRESS NOTE ADULT - SUBJECTIVE AND OBJECTIVE BOX
PROGRESS NOTE    Reason for follow up: ACHD  Update: Patient is feeling well today, hopefully being discharged if platelets are stable. Still with some sinus bradycardia overnight while sleeping, but no signs of chronotropic incompetence.       Review of symptoms:   Cardiac:  No chest pain. No dyspnea. No palpitations.  Respiratory: no cough. No dyspnea  Gastrointestinal: No diarrhea. No abdominal pain. No bleeding.   Neuro: No focal neuro complaints.    Vitals:  T(C): 36 (01-31-25 @ 08:00), Max: 36.4 (01-31-25 @ 00:00)  HR: 53 (01-31-25 @ 12:00) (42 - 66)  BP: 95/63 (01-31-25 @ 12:00) (82/50 - 119/82)  RR: 21 (01-31-25 @ 12:00) (11 - 22)  SpO2: 95% (01-31-25 @ 12:00) (95% - 100%)  Wt(kg): --  I&O's Summary    30 Jan 2025 07:01  -  31 Jan 2025 07:00  --------------------------------------------------------  IN: 1240 mL / OUT: 800 mL / NET: 440 mL    31 Jan 2025 07:01  -  31 Jan 2025 13:10  --------------------------------------------------------  IN: 150 mL / OUT: 800 mL / NET: -650 mL      Weight (kg): 59.1 (01-28 @ 23:00)    PHYSICAL EXAM:  General - non-dysmorphic, well-developed.  Skin - no rash, no cyanosis.  Eyes / ENT - external appearance of eyes, ears, & nares normal.  Pulmonary - normal inspiratory effort, no retractions, lungs clear bilaterally, no wheezes, no rales.  Cardiovascular - normal rate, regular rhythm, normal S1 & S2, III/VI systolic murmur RLSB, no rubs, no gallops, capillary refill < 2sec, normal pulses.  Gastrointestinal - soft, no hepatomegaly.  Musculoskeletal - no clubbing, no edema.  Neurologic / Psychiatric - moves all extremities, normal tone.      CURRENT CARDIAC MEDICATIONS:      CURRENT OTHER MEDICATIONS:  oseltamivir 75 milliGRAM(s) Oral two times a day  dextrose 50% Injectable 25 Gram(s) IV Push once, Stop order after: 1 Doses  dextrose 50% Injectable 12.5 Gram(s) IV Push once, Stop order after: 1 Doses  dextrose Oral Gel 15 Gram(s) Oral once, Stop order after: 1 Doses  glucagon  Injectable 1 milliGRAM(s) IntraMuscular once, Stop order after: 1 Doses  insulin glargine Injectable (LANTUS) 18 Unit(s) SubCutaneous at bedtime  insulin lispro (ADMELOG) corrective regimen sliding scale   SubCutaneous three times a day before meals  insulin lispro (ADMELOG) corrective regimen sliding scale   SubCutaneous at bedtime  insulin lispro Injectable (ADMELOG) 8 Unit(s) SubCutaneous three times a day before meals  apixaban 5 milliGRAM(s) Oral two times a day, Stop order after: 90 Days  chlorhexidine 2% Cloths 1 Application(s) Topical <User Schedule>  dextrose 5%. 1000 milliLiter(s) (50 mL/Hr) IV Continuous <Continuous>  dextrose 5%. 1000 milliLiter(s) (100 mL/Hr) IV Continuous <Continuous>  influenza   Vaccine 0.5 milliLiter(s) IntraMuscular once      LABS:	 	                            13.0   2.37  )-----------( 77       ( 31 Jan 2025 00:18 )             36.9     01-31    138  |  108[H]  |  8   ----------------------------<  254[H]  3.4[L]   |  18[L]  |  0.52    Ca    8.1[L]      31 Jan 2025 00:18  Phos  2.4     01-31  Mg     2.10     01-31    TPro  5.3[L]  /  Alb  3.0[L]  /  TBili  0.4  /  DBili  x   /  AST  25  /  ALT  14  /  AlkPhos  42  01-31    PT/INR/PTT ( 31 Jan 2025 00:18 )                       :                       :      11.7         :       27.6                  .        .                   .              .           .       0.98        .                                       Lipid Profile: Date: 01-28 @ 22:20  Total cholesterol 171; Direct LDL: --; HDL: 28; Triglycerides:109    HgA1c:   TSH: Thyroid Stimulating Hormone, Serum: 2.17 uIU/mL      TELEMETRY: SB  ECG:    DIAGNOSTIC TESTING:  [ ] Echocardiogram: 01/29/25  Summary:   1. History of dextrocardia, double outlet right ventricle, S,L,L with a large ventricular septal defect status post non-fenestrated lateral tunnel Fontan.   2. Status post lateral tunnel non-fenestrated Fontan baffle.   3. Mild mitral valve regurgitation.   4. Physiologic tricuspid valve regurgitation.   5. No systemic outflow obstruction with trivial aortic regurgitation.   6. Unobstructedright Jordan with laminar biphasic low velocity flow.   7. Unobstructed Fontan with laminar biphasic and low velocity flow; anastomosis to the branch pulmonary arteries could not be seen well.   8. Qualitatively normal right ventricular systolic function.   9. Qualitatively normal left ventricular systolic function.  10. No pericardial effusion.  11. No pleural effusion.    [ ]  Catheterization:  [ ] Stress Test:    OTHER: 	  < from: CT Angio Chest PE Protocol w/ IV Cont (01.28.25 @ 19:56) >  FINDINGS:    LUNGS AND LARGE AIRWAYS: Patent central airways. Scattered subsegmental   atelectasis in the right middle and lower lobes. No consolidation.  PLEURA: No pleural effusion.  VESSELS: Patent Jordan anastomosis of the SVC to the pulmonary artery.   Fontan anastomosis of the IVC to the pulmonary artery is opacified.   Asymmetric flow to the right pulmonary arteries. Left pulmonary arteries   are poorly opacified. Heterogeneous filling within the right main   pulmonary artery is likely related to mixing. Distally, however there are   numerous filling defects within multiple segmental pulmonary artery   branches throughout all three right lung lobes.  HEART: Dextrocardia. Ventricular septal defect.  MEDIASTINUM AND ZULMA: No lymphadenopathy.  CHEST WALL AND LOWER NECK: Sternotomy.  VISUALIZED UPPER ABDOMEN: Within normal limits.  BONES: Degenerative changes.    IMPRESSION:  Multiple findings related to congenital cardiac anomalies with   dextrocardia have been described in detail on the recent prior MRI of the   chest.    Asymmetric preferential perfusion to the right lung with multiple   scattered filling defects throughout numerous segmental pulmonary artery   branches throughout the right lung, which may be artifactual and/or   related to the mixing due to the underlying cardiac abnormalities;   therefore this exam may not be reliable to reliably evaluate for   pulmonary emboli; if there is strong clinical suspicion for emboli; a V/Q   scan can be considered.    Heterogeneous filling within the right main and lobar pulmonary arteries   may be related to mixing artifact.    The left pulmonary veins as well as the arteries are not opacified   therefore limited evaluation..    --- End of Report ---

## 2025-01-31 NOTE — DISCHARGE NOTE PROVIDER - NSDCCPTREATMENT_GEN_ALL_CORE_FT
PRINCIPAL PROCEDURE  Procedure: Echocardiography, 2D  Findings and Treatment: Summary:   1. History of dextrocardia, double outlet right ventricle, S,L,L with a large ventricular septal defect status post non-fenestrated lateral tunnel Fontan.   2. Status post lateral tunnel non-fenestrated Fontan baffle.   3. Mild mitral valve regurgitation.   4. Physiologic tricuspid valve regurgitation.   5. No systemic outflow obstruction with trivial aortic regurgitation.   6. Unobstructedright Jordan with laminar biphasic low velocity flow.   7. Unobstructed Fontan with laminar biphasic and low velocity flow; anastomosis to the branch pulmonary arteries could not be seen well.   8. Qualitatively normal right ventricular systolic function.   9. Qualitatively normal left ventricular systolic function.  10. No pericardial effusion.  11. No pleural effusion.

## 2025-01-31 NOTE — DISCHARGE NOTE PROVIDER - NSDCACTIVITY_GEN_ALL_CORE
"    Preventive Care at the 6-8 Year Visit  Growth Percentiles & Measurements   Weight: 46 lbs 6.4 oz / 21 kg (actual weight) / 54 %ile based on CDC 2-20 Years weight-for-age data using vitals from 6/7/2018.   Length: 3' 9\" / 114.3 cm 40 %ile based on CDC 2-20 Years stature-for-age data using vitals from 6/7/2018.   BMI: Body mass index is 16.11 kg/(m^2). 70 %ile based on CDC 2-20 Years BMI-for-age data using vitals from 6/7/2018.   Blood Pressure: Blood pressure percentiles are 76.1 % systolic and 67.3 % diastolic based on the August 2017 AAP Clinical Practice Guideline.    Your child should be seen in 1 year for preventive care.    Development    Your child has more coordination and should be able to tie shoelaces.    Your child may want to participate in new activities at school or join community education activities (such as soccer) or organized groups (such as Girl [employer]outs).    Set up a routine for talking about school and doing homework.    Limit your child to 1 to 2 hours of quality screen time each day.  Screen time includes television, video game and computer use.  Watch TV with your child and supervise Internet use.    Spend at least 15 minutes a day reading to or reading with your child.    Your child s world is expanding to include school and new friends.  he will start to exert independence.     Diet    Encourage good eating habits.  Lead by example!  Do not make  special  separate meals for him.    Help your child choose fiber-rich fruits, vegetables and whole grains.  Choose and prepare foods and beverages with little added sugars or sweeteners.    Offer your child nutritious snacks such as fruits, vegetables, yogurt, turkey, or cheese.  Remember, snacks are not an essential part of the daily diet and do add to the total calories consumed each day.  Be careful.  Do not overfeed your child.  Avoid foods high in sugar or fat.      Cut up any food that could cause choking.    Your child needs 800 " milligrams (mg) of calcium each day. (One cup of milk has 300 mg calcium.) In addition to milk, cheese and yogurt, dark, leafy green vegetables are good sources of calcium.    Your child needs 10 mg of iron each day. Lean beef, iron-fortified cereal, oatmeal, soybeans, spinach and tofu are good sources of iron.    Your child needs 600 IU/day of vitamin D.  There is a very small amount of vitamin D in food, so most children need a multivitamin or vitamin D supplement.    Let your child help make good choices at the grocery store, help plan and prepare meals, and help clean up.  Always supervise any kitchen activity.    Limit soft drinks and sweetened beverages (including juice) to no more than one small beverage a day. Limit sweets, treats and snack foods (such as chips), fast foods and fried foods.    Exercise    The American Heart Association recommends children get 60 minutes of moderate to vigorous physical activity each day.  This time can be divided into chunks: 30 minutes physical education in school, 10 minutes playing catch, and a 20-minute family walk.    In addition to helping build strong bones and muscles, regular exercise can reduce risks of certain diseases, reduce stress levels, increase self-esteem, help maintain a healthy weight, improve concentration, and help maintain good cholesterol levels.    Be sure your child wears the right safety gear for his or her activities, such as a helmet, mouth guard, knee pads, eye protection or life vest.    Check bicycles and other sports equipment regularly for needed repairs.     Sleep    Help your child get into a sleep routine: washing his or her face, brushing teeth, etc.    Set a regular time to go to bed and wake up at the same time each day. Teach your child to get up when called or when the alarm goes off.    Avoid heavy meals, spicy food and caffeine before bedtime.    Avoid noise and bright rooms.     Avoid computer use and watching TV before  bed.    Your child should not have a TV in his bedroom.    Your child needs 9 to 10 hours of sleep per night.    Safety    Your child needs to be in a car seat or booster seat until he is 4 feet 9 inches (57 inches) tall.  Be sure all other adults and children are buckled as well.    Do not let anyone smoke in your home or around your child.    Practice home fire drills and fire safety.       Supervise your child when he plays outside.  Teach your child what to do if a stranger comes up to him.  Warn your child never to go with a stranger or accept anything from a stranger.  Teach your child to say  NO  and tell an adult he trusts.    Enroll your child in swimming lessons, if appropriate.  Teach your child water safety.  Make sure your child is always supervised whenever around a pool, lake or river.    Teach your child animal safety.       Teach your child how to dial and use 911.       Keep all guns out of your child s reach.  Keep guns and ammunition locked up in different parts of the house.     Self-esteem    Provide support, attention and enthusiasm for your child s abilities, achievements and friends.    Create a schedule of simple chores.       Have a reward system with consistent expectations.  Do not use food as a reward.     Discipline    Time outs are still effective.  A time out is usually 1 minute for each year of age.  If your child needs a time out, set a kitchen timer for 6 minutes.  Place your child in a dull place (such as a hallway or corner of a room).  Make sure the room is free of any potential dangers.  Be sure to look for and praise good behavior shortly after the time out is done.    Always address the behavior.  Do not praise or reprimand with general statements like  You are a good girl  or  You are a naughty boy.   Be specific in your description of the behavior.    Use discipline to teach, not punish.  Be fair and consistent with discipline.     Dental Care    Around age 6, the first of  your child s baby teeth will start to fall out and the adult (permanent) teeth will start to come in.    The first set of molars comes in between ages 5 and 7.  Ask the dentist about sealants (plastic coatings applied on the chewing surfaces of the back molars).    Make regular dental appointments for cleanings and checkups.       Eye Care    Your child s vision is still developing.  If you or your pediatric provider has concerns, make eye checkups at least every 2 years.        ================================================================   No restrictions

## 2025-01-31 NOTE — PROGRESS NOTE ADULT - ASSESSMENT
The patient is a 34y Female with PMH of DVT and dextrocardia/VSD, presenting with weight loss, polyuria, and polydipsia and found to have new-onset DM c/b severe DKA. Endocrinology consulted for uncontrolled DM complicated by DKA.    #New-Onset Uncontrolled Diabetes Mellitus   #Diabetic Ketoacidosis  - A1C with Estimated Average Glucose Result: 14.4 % (01-28-25). Previous A1c 5.4% in 2022.  - eGFR: 119 mL/min/1.73m2 (01-29-25)  - Weight (kg): 59.1 (01-28-25)  - initial labs with c/w severe DKA--> s/p insulin gtt  - suspect T1DM given patient body habitus, young age, relatively rapid-onset, and family hx of T1DM/autoimmune disease       INPATIENT PLAN:  - Inpatient glucose goals: 100-180       - Increase Lantus to 20 units bedtime.        - Increase Admelog to 8 units TID before meals (HOLD if NPO or not eating)       - Continue Low dose admelog correction scale TIDQAC and separate low dose scale QHS  - Please check FSG before meals and QHS, or q6h while NPO  - consistent carb diet when eating  - nutrition consult placed  - Glutamic Acid Decarboxylase antibody, Zinc Transporter 8 Ab, and Islet Antigen (IA-2) Antibody-sent and received 1/28.   - once patient is more stable and less glucotoxic will check c-peptide (send with BMP for glucose)- please send now.   - will consider CT A/P w/ IVC to rule out pancreatic tumor causing new-onset diabetes - hold off for now      DISCHARGE PLANNING:  - Discharge recs pending clinical course and workup above: will require basal/bolus insulin (doses TBD)  -Please send:  - Rx for basal insulin pen-check if lantus is covered if not can try alternative- basaglar, Semglee, toujeo, tresiba  - Rx for bolus insulin pen-check if admelog is covered if not can try alternative-novolog, humalog  - Freestyle Christianne 3 Plus CGM plus reader-. Please send Rx to [facility] to determine coverage.   - Glucometer (ACCU_CHECK iain Connect, Ascensia Contour Next EZ or One, Freestyle [place] LITE or OneTouch Verio IQ)  - Glucometer test strips and lancets  (make sure compatible with glucometer), dispense #100 (or #200) use as directed, alcohol pads  - BD chana 4mm pen needles  - Glucose tabs, Baqsimi nasal spray or glucagon emergency kit for hypoglycemia risk   - If patient is not covered for CGM, patient should check FSBG premeals and bedtime.   - Patient should call their doctor when FSBG <70 or above >400 and or consistently above 200s as changes in the regimen will have to be made.  - will need RN insulin/glucometer teaching - seen by CDE on 1/30 and demonstrating good understanding  - Endocrinology follow up as follows:   3/18 10am with NP Bree Kincaid   4/23 2pm with JULIANNE Kincaid  6/3 11:20am with Dr. Barriga  Endocrinology [facility]: [address] [place] [address]. [address]. [place] [address]. Tel: [phone]     #Hypocalcemia  - corrected calcium remains mildly low today (8.2), likely dilutional  - trend CMP daily    d/w Dr. Hill.      BAILEY Sánchez-BC  Nurse Practitioner  Division of Endocrinology  Contact on TEAMS    If out of hospital/unavailable when paged, please note: patient will be cared for by another provider on the endocrine service.  For urgent concerns: call the endocrine answering service for assistance to reach covering provider ([phone]). For non-urgent matters: please email [email or web address] for assistance.   The patient is a 34y Female with PMH of DVT and dextrocardia/VSD, presenting with weight loss, polyuria, and polydipsia and found to have new-onset DM c/b severe DKA. Endocrinology consulted for uncontrolled DM complicated by DKA.    #New-Onset Uncontrolled Diabetes Mellitus   #Diabetic Ketoacidosis  - A1C with Estimated Average Glucose Result: 14.4 % (01-28-25). Previous A1c 5.4% in 2022.  - eGFR: 119 mL/min/1.73m2 (01-29-25)  - Weight (kg): 59.1 (01-28-25)  - initial labs with c/w severe DKA--> s/p insulin gtt  - suspect T1DM given patient body habitus, young age, relatively rapid-onset, and family hx of T1DM/autoimmune disease  - C-Peptide, Serum: 0.8 ng/mL (01-30-25 @ 17:06) glucose 200 at that time indicating low endogenous insulin production.       INPATIENT PLAN:  - Inpatient glucose goals: 100-180: FS above goal.        - Increase Lantus to 20 units bedtime.        - Increase Admelog to 8 units TID before meals (HOLD if NPO or not eating)       - Continue Low dose admelog correction scale TIDQAC and separate low dose scale QHS  - Please check FSG before meals and QHS, or q6h while NPO  - consistent carb diet when eating  - nutrition consult placed  - Glutamic Acid Decarboxylase antibody, Zinc Transporter 8 Ab, and Islet Antigen (IA-2) Antibody-received & pending 1/28.   - will consider CT A/P w/ IVC to rule out pancreatic tumor causing new-onset diabetes - hold off for now    DISCHARGe RECOMMENDATIONS:   - Lantus 20 units at bedtime.   - Admelog 8 units before meals  - Dexcom G7 sensors covered and pt educated.   - Endocrinology follow up as follows:   3/18 10am with JULIANNE Kincaid   4/23 2pm with JULIANNE Kincaid  6/3 11:20am with Dr. Barriga  Endocrinology [facility]: [address] [place] [address]. [address]. [place] [address]. Tel: [phone]     -Please send:  - Glucometer (ACCU_CHECK iain Connect, Ascensia Contour Next EZ or One, Freestyle [place] LITE or OneTouch Verio IQ)  - Glucometer test strips and lancets  (make sure compatible with glucometer), dispense #100 (or #200) use as directed, alcohol pads  - BD chana 4mm pen needles  - Glucose tabs, Baqsimi nasal spray or glucagon emergency kit for hypoglycemia risk   - If patient is not covered for CGM, patient should check FSBG premeals and bedtime.   - Patient should call their doctor when FSBG <70 or above >400 and or consistently above 200s as changes in the regimen will have to be made.  - will need RN insulin/glucometer teaching - seen by CDE on 1/30 and demonstrating good understanding.      #Hypocalcemia  - corrected calcium improved and now WNL 8.9.   - trend CMP daily    d/w Dr. Hill.      BAILEY Sánchez-BC  Nurse Practitioner  Division of Endocrinology  Contact on TEAMS    If out of hospital/unavailable when paged, please note: patient will be cared for by another provider on the endocrine service.  For urgent concerns: call the endocrine answering service for assistance to reach covering provider ([phone]). For non-urgent matters: please email [email or web address] for assistance.

## 2025-01-31 NOTE — DISCHARGE NOTE PROVIDER - PROVIDER TOKENS
PROVIDER:[TOKEN:[[id number]],FOLLOWUP:[1 week],ESTABLISHEDPATIENT:[T]],PROVIDER:[TOKEN:[[id number]],ESTABLISHEDPATIENT:[T]]

## 2025-01-31 NOTE — DISCHARGE NOTE PROVIDER - HOSPITAL COURSE
For full details, please see H&P, progress notes, consult notes and ancillary notes.    *** ONE LINER    Patient admitted to Internal Medicine for further management.    Hospital Course:  ***    On day of discharge, patient is clinically stable with no new exam findings or acute symptoms compared to prior. The patient was seen by the attending physician on the date of discharge and deemed stable and acceptable for discharge. The patient's chronic medical conditions were treated accordingly per the patient's home medication regimen. The patient's medication reconciliation (with changes made to chronic medications), follow up appointments, discharge orders, instructions, and significant lab and diagnostic studies are as noted.     Discharge follow up action items:     1. Follow up with PCP in 1-2 weeks. Follow up with ____subspecialists_____ in ___1-2 weeks___.  2. Follow up labs: ***  3. Medication changes: ***  4. On hold medications: ***  5. Incidental findings: ***    Patient's ordered code status: ***  Patient disposition: ***    Patient will be discharged to _____ with close follow up. 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  Patient endorsing generalized weakness with increased thirst and urinary frequency over the past 3 weeks.  States over the past few days she has had increasing shortness of breath with dyspnea on exertion. Felt so weak that she could barely do anything. Patient also endorsing sensation of skin flushing and itchiness. Denies any pain to the skin/rash. Denies any chest pain.  Denies any tobacco use.  Denies any fevers, chills, headache, dizziness, abdominal pain, nausea vomiting diarrhea.  Denies any recent travel or sick contacts. No history of starvation, alcohol use, no med such as metformin or salicylate use. Pt not on any medications. 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    Labs notable for VBG with pH of 6.96 and bicarb less than 7.  Anion gap of 23, blood glucose 300s, beta hydroxybutyrate 8.1.  Patient received 2L LR in ED, insulin drip started. Pt positive for Influenza A, CXR with clear lungs with dextrocardia.  (28 Jan 2025 15:43)    Hospital Course:    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 Pediatric cardiology was consulted, echocardiogram with normal biventricular function, unobstructed Jordan and Fontan, mild MR/TR, no acute issues. Full AC was started with Eliquis. Pt was continuously monitored on tele. Pt was started on Tamiflu 75 mg BID for 5 days. Endocrinology was consulted, adjusted insulin drip per DKA protocol and transitioned to subq heparin. Insulin teaching was done. Pt to follow up outpatient with endocrinology and cardiology.    On day of discharge, patient is clinically stable with no new exam findings or acute symptoms compared to prior. The patient was seen by the attending physician on the date of discharge and deemed stable and acceptable for discharge. The patient's chronic medical conditions were treated accordingly per the patient's home medication regimen. The patient's medication reconciliation (with changes made to chronic medications), follow up appointments, discharge orders, instructions, and significant lab and diagnostic studies are as noted.    Important Medication Changes and Reason:  Tamiflu for 2 more days  Lantus 20, Admelog 8    Active or Pending Issues Requiring Follow-up:  Endocrinology follow up as follows:   3/18 10am with NP Bree Kincaid   4/23 2pm with JULIANNE Kincaid  6/3 11:20am with Dr. aBrriga  Endocrinology [facility]: [address] [place] [address]. [address]. [place] [address]. 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  Patient endorsing generalized weakness with increased thirst and urinary frequency over the past 3 weeks.  States over the past few days she has had increasing shortness of breath with dyspnea on exertion. Felt so weak that she could barely do anything. Patient also endorsing sensation of skin flushing and itchiness. Denies any pain to the skin/rash. Denies any chest pain.  Denies any tobacco use.  Denies any fevers, chills, headache, dizziness, abdominal pain, nausea vomiting diarrhea.  Denies any recent travel or sick contacts. No history of starvation, alcohol use, no med such as metformin or salicylate use. Pt not on any medications. 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    Labs notable for VBG with pH of 6.96 and bicarb less than 7.  Anion gap of 23, blood glucose 300s, beta hydroxybutyrate 8.1.  Patient received 2L LR in ED, insulin drip started. Pt positive for Influenza A, CXR with clear lungs with dextrocardia.  (28 Jan 2025 15:43)    Hospital Course:    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 Pediatric cardiology was consulted, echocardiogram with normal biventricular function, unobstructed Jordan and Fontan, mild MR/TR, no acute issues. Full AC was started with Eliquis. Pt was continuously monitored on tele. Pt was started on Tamiflu 75 mg BID for 5 days. Endocrinology was consulted, adjusted insulin drip per DKA protocol and transitioned to subq heparin. Insulin teaching was done. Pt to follow up outpatient with endocrinology and cardiology.    Of note, patient was thrombocytopenic that improved to 90s on day of discharge.  Discussed with Dr. Mariscal and patient - we all agreed to hold Eliquis for now and get repeat CBC on Monday, 2/3.  Cards team will follow up labs on Monday and instruct patient on next steps (eliquis vs holding).    On day of discharge, patient is clinically stable with no new exam findings or acute symptoms compared to prior. The patient was seen by the attending physician on the date of discharge and deemed stable and acceptable for discharge. The patient's chronic medical conditions were treated accordingly per the patient's home medication regimen. The patient's medication reconciliation (with changes made to chronic medications), follow up appointments, discharge orders, instructions, and significant lab and diagnostic studies are as noted.    Important Medication Changes and Reason:  Tamiflu for 2 more days  Lantus 20, Admelog 8    Active or Pending Issues Requiring Follow-up:  Endocrinology follow up as follows:   3/18 10am with JULIANNE Kincaid   4/23 2pm with JULIANNE Kincaid  6/3 11:20am with Dr. Barriga  Endocrinology [facility]: [address] [place] [address]. [address]. [place] [address]. Tel: [phone]       Follow up with cardiology outpatient (outpatient CPET, holter monitor, cardiac MRI)    Discharge Diagnoses:  Diabetic ketoacidosis  Influenza

## 2025-01-31 NOTE — DISCHARGE NOTE PROVIDER - NSDCCPCAREPLAN_GEN_ALL_CORE_FT
PRINCIPAL DISCHARGE DIAGNOSIS  Diagnosis: Diabetic ketoacidosis  Assessment and Plan of Treatment: You presented to the hospital with fatigue and were found to have elevated blood sugar, beta hydroxy butyrate, and acidosis, all of which is consistent with diabetic ketoacidosis. Diabetic ketoacidosis (DKA) is a serious complication of diabetes that can be life-threatening. It develops when your body doesn't have enough insulin to allow blood sugar into your cells for use as energy. Instead, your liver breaks down fat for fuel, a process that produces acids called ketones. When too many ketones are produced too fast, they can build up to dangerous levels in your body. We started you on fluid and an insulin drip and safely brought your blood sugar down. You were evaluated by endocrinology who taught you how to use insulin. You will need to be on insulin daily, please continue to take insulin as prescribed and if you run out, please contact endocrine office. DO NOT STOP TAKING INSULIN, this could be life threatening and can cause DKA to recur. Please  your scripts at [facility] and follow up with Endocrinology for ongoing diabetes care.      SECONDARY DISCHARGE DIAGNOSES  Diagnosis: Influenza A  Assessment and Plan of Treatment: You were incidentally found to have the flu. Please continue to drink fluids and rest at home.    Diagnosis: CHD (coronary heart disease)  Assessment and Plan of Treatment: You have extensive cardiac history. Dr. Mariscal, our congenital heart disease specialist, saw you in the hospital and reviewed your case. Please follow up with her ASAP and continue to see her to manage your congenital heart disease. The decision was made to start you on full anticoagulation, a medication called Eliquis. Please fill this medication at the pharmacy but DO NOT take it until you follow up with your primary doctor and Dr. Mariscal to ensure your platelet levels return to normal.     PRINCIPAL DISCHARGE DIAGNOSIS  Diagnosis: Diabetic ketoacidosis  Assessment and Plan of Treatment: You presented to the hospital with fatigue and were found to have elevated blood sugar, beta hydroxy butyrate, and acidosis, all of which is consistent with diabetic ketoacidosis. Diabetic ketoacidosis (DKA) is a serious complication of diabetes that can be life-threatening. It develops when your body doesn't have enough insulin to allow blood sugar into your cells for use as energy. Instead, your liver breaks down fat for fuel, a process that produces acids called ketones. When too many ketones are produced too fast, they can build up to dangerous levels in your body. We started you on fluid and an insulin drip and safely brought your blood sugar down. You were evaluated by endocrinology who taught you how to use insulin. You will need to be on insulin daily, please continue to take insulin as prescribed and if you run out, please contact endocrine office. DO NOT STOP TAKING INSULIN, this could be life threatening and can cause DKA to recur. Please  your scripts at [facility] and follow up with Endocrinology for ongoing diabetes care.  Endocrinology follow up as follows:   3/18 10am with JULIANNE Kincaid   4/23 2pm with JULIANNE Kincaid  6/3 11:20am with Dr. Barriga  Endocrinology [facility]: [address] [place] [address]. [address]. [place] [address]. Tel: [phone]      SECONDARY DISCHARGE DIAGNOSES  Diagnosis: Influenza A  Assessment and Plan of Treatment: You were incidentally found to have the flu. Please continue to drink fluids and rest at home. Please take your Tamiflu as prescribed.    Diagnosis: CHD (coronary heart disease)  Assessment and Plan of Treatment: You have extensive cardiac history. Dr. Mariscal, our congenital heart disease specialist, saw you in the hospital and reviewed your case. Please follow up with her ASAP and continue to see her to manage your congenital heart disease. The decision was made to start you on full anticoagulation, a medication called Eliquis. Please fill this medication at the pharmacy but DO NOT take it until you follow up with your primary doctor and Dr. Mariscal to ensure your platelet levels return to normal.

## 2025-01-31 NOTE — DISCHARGE NOTE PROVIDER - NSDCFUSCHEDAPPT_GEN_ALL_CORE_FT
Cris Kincaid  [facility]  [address]  Scheduled Appointment: 03/18/2025

## 2025-01-31 NOTE — DISCHARGE NOTE PROVIDER - CARE PROVIDER_API CALL
Luiza Mariscal  Cardiovascular Disease  [address] [place] [id number]  Phone: [phone]  Fax: [phone]  Established Patient  Follow Up Time: 1 week    Cathie Pineda  [facility]  Phone: [phone]  Fax: [phone]  Established Patient  Follow Up Time:

## 2025-02-02 LAB
CULTURE RESULTS: SIGNIFICANT CHANGE UP
CULTURE RESULTS: SIGNIFICANT CHANGE UP
SPECIMEN SOURCE: SIGNIFICANT CHANGE UP
SPECIMEN SOURCE: SIGNIFICANT CHANGE UP

## 2025-02-04 DIAGNOSIS — Z87.74 PERSONAL HISTORY OF (CORRECTED) CONGENITAL MALFORMATIONS OF HEART AND CIRCULATORY SYSTEM: ICD-10-CM

## 2025-02-04 LAB
BASOPHILS # BLD AUTO: 0.02 K/UL
BASOPHILS NFR BLD AUTO: 0.5 %
EOSINOPHIL # BLD AUTO: 0.07 K/UL
EOSINOPHIL NFR BLD AUTO: 1.8 %
HCT VFR BLD CALC: 39.6 %
HGB BLD-MCNC: 13.3 G/DL
IMM GRANULOCYTES NFR BLD AUTO: 0.8 %
LYMPHOCYTES # BLD AUTO: 1.05 K/UL
LYMPHOCYTES NFR BLD AUTO: 26.6 %
MAN DIFF?: NORMAL
MCHC RBC-ENTMCNC: 31.1 PG
MCHC RBC-ENTMCNC: 33.6 G/DL
MCV RBC AUTO: 92.5 FL
MONOCYTES # BLD AUTO: 0.49 K/UL
MONOCYTES NFR BLD AUTO: 12.4 %
NEUTROPHILS # BLD AUTO: 2.29 K/UL
NEUTROPHILS NFR BLD AUTO: 57.9 %
PLATELET # BLD AUTO: 160 K/UL
RBC # BLD: 4.28 M/UL
RBC # FLD: 14.5 %
WBC # FLD AUTO: 3.95 K/UL

## 2025-02-05 LAB — GAD65 AB SER-MCNC: 1.73 NMOL/L — HIGH

## 2025-02-08 LAB — ZINC TRANSPORTER 8 AB, RESULT: <15 U/ML — SIGNIFICANT CHANGE UP

## 2025-02-26 DIAGNOSIS — E11.9 TYPE 2 DIABETES MELLITUS W/OUT COMPLICATIONS: ICD-10-CM

## 2025-02-26 RX ORDER — BLOOD-GLUCOSE SENSOR
EACH MISCELLANEOUS
Qty: 3 | Refills: 0 | Status: ACTIVE | COMMUNITY
Start: 2025-02-26 | End: 1900-01-01

## 2025-02-28 RX ORDER — PEN NEEDLE, DIABETIC 31 GX5/16"
32G X 4 MM NEEDLE, DISPOSABLE MISCELLANEOUS
Qty: 400 | Refills: 3 | Status: ACTIVE | COMMUNITY
Start: 2025-02-28 | End: 1900-01-01

## 2025-03-06 NOTE — PRE-ANESTHESIA EVALUATION ADULT - NSANTHRISKNONERD_GEN_ALL_CORE
Pt here from  for elevated blood sugar.  Pt states he has been feeling off since last Tuesday.  BS was in the 300's at  so they sent him here for further work up.  BS in triage was 320.   No risk alerts present

## 2025-03-18 ENCOUNTER — APPOINTMENT (OUTPATIENT)
Dept: ENDOCRINOLOGY | Facility: CLINIC | Age: 35
End: 2025-03-18
Payer: COMMERCIAL

## 2025-03-18 VITALS
BODY MASS INDEX: 25.76 KG/M2 | TEMPERATURE: 97.5 F | OXYGEN SATURATION: 98 % | HEART RATE: 57 BPM | DIASTOLIC BLOOD PRESSURE: 68 MMHG | WEIGHT: 140 LBS | SYSTOLIC BLOOD PRESSURE: 117 MMHG | HEIGHT: 62 IN

## 2025-03-18 DIAGNOSIS — E11.9 TYPE 2 DIABETES MELLITUS W/OUT COMPLICATIONS: ICD-10-CM

## 2025-03-18 LAB — HBA1C MFR BLD HPLC: 8.2

## 2025-03-18 PROCEDURE — 95251 CONT GLUC MNTR ANALYSIS I&R: CPT

## 2025-03-18 PROCEDURE — 99215 OFFICE O/P EST HI 40 MIN: CPT

## 2025-03-18 PROCEDURE — 83036 HEMOGLOBIN GLYCOSYLATED A1C: CPT | Mod: QW

## 2025-03-18 RX ORDER — INSULIN GLARGINE-YFGN 100 [IU]/ML
100 INJECTION, SOLUTION SUBCUTANEOUS
Qty: 2 | Refills: 1 | Status: ACTIVE | COMMUNITY
Start: 2025-03-18 | End: 1900-01-01

## 2025-03-18 RX ORDER — BLOOD-GLUCOSE SENSOR
EACH MISCELLANEOUS
Qty: 9 | Refills: 3 | Status: ACTIVE | COMMUNITY
Start: 2025-03-18 | End: 1900-01-01

## 2025-03-28 LAB
ANION GAP SERPL CALC-SCNC: 12 MMOL/L
BUN SERPL-MCNC: 16 MG/DL
C PEPTIDE SERPL-MCNC: 0.8 NG/ML
CALCIUM SERPL-MCNC: 9 MG/DL
CHLORIDE SERPL-SCNC: 105 MMOL/L
CO2 SERPL-SCNC: 23 MMOL/L
CREAT SERPL-MCNC: 0.77 MG/DL
EGFRCR SERPLBLD CKD-EPI 2021: 103 ML/MIN/1.73M2
GLUCOSE SERPL-MCNC: 79 MG/DL
PANC ISLET CELL AB SER QL: NORMAL
POTASSIUM SERPL-SCNC: 4.2 MMOL/L
SODIUM SERPL-SCNC: 139 MMOL/L
THYROGLOB AB SERPL-ACNC: 100 IU/ML
THYROPEROXIDASE AB SERPL IA-ACNC: 195 IU/ML
TSH SERPL-ACNC: 1.42 UIU/ML
TTG IGA SER IA-ACNC: <0.5 U/ML
TTG IGA SER-ACNC: NEGATIVE
TTG IGG SER IA-ACNC: <0.8 U/ML
TTG IGG SER IA-ACNC: NEGATIVE

## 2025-04-18 NOTE — OB PROVIDER TRIAGE NOTE - NS_FETALPRESENTATIONA_OBGYN_ALL_OB
04/17/25 2000   Patient Belongings   Medications Brought with Patient? No   Belongings Retained by Patient at Bedside Clothing   Clothing Hat/Cap;Sweater  (sweater (1) hat (1))   Belongings Sent to Loss Prevention/Public Safety (!) Other (comment)  (razors (5))   Belongings Retained by Family/Significant Other None   Belongings Retained/Secured in Department Designated Storage Area Money;Cell phone;Electronic equipment;[employer] - Corrective Lenses;Clothing;Other (comment)  ($1 and .54 cents chargers (10) headphones (1) lighters (3) brush (1) speaker (1) HBA (2) game switch (1) game controller (1))   [employer] - Corrective Lenses Glasses   Clothing Pants;Shirt;Socks;Underpants;Hat/Cap;Other (Comment);Footwear  (pants (1) shirts (1) socks (2) underwear (1) hat (2) towel (1) backpack (1))   Belongings in Locker (OR/PACU/Outpatient) None       Patient verbally informed that body search would be performed to  remove any items that may be potentially used for self harm or suicidal behavior, ensure that no potentially dangerous mind or mood altering drugs were being introduced into treatment environment, remove any items that may pose a risk for personal safety due to thought or mood disorder and advised about what would occur during the search process.  Patient denies being in possession of potentially dangerous items.  The patient was provided with an opportunity to ask questions or voice any concerns related to the body surface search prior to it being performed.  The patient agreed to participate in the search process.  Body surface search was conducted by two staff members: (Manas RN, John MORENO). Metal detector wand used during body and belonging search, contraband was not found.  Patient response to search process was Cooperative with no adverse physical or psychological response.  Contraband was not found during the search process.   
Cephalic

## 2025-04-21 DIAGNOSIS — R76.8 OTHER SPECIFIED ABNORMAL IMMUNOLOGICAL FINDINGS IN SERUM: ICD-10-CM

## 2025-04-23 ENCOUNTER — APPOINTMENT (OUTPATIENT)
Dept: ENDOCRINOLOGY | Facility: CLINIC | Age: 35
End: 2025-04-23

## 2025-04-24 ENCOUNTER — APPOINTMENT (OUTPATIENT)
Dept: OBGYN | Facility: CLINIC | Age: 35
End: 2025-04-24
Payer: COMMERCIAL

## 2025-04-24 VITALS
BODY MASS INDEX: 26.31 KG/M2 | WEIGHT: 143 LBS | HEIGHT: 62 IN | DIASTOLIC BLOOD PRESSURE: 79 MMHG | SYSTOLIC BLOOD PRESSURE: 127 MMHG

## 2025-04-24 DIAGNOSIS — N97.9 FEMALE INFERTILITY, UNSPECIFIED: ICD-10-CM

## 2025-04-24 DIAGNOSIS — Z3A.17 17 WEEKS GESTATION OF PREGNANCY: ICD-10-CM

## 2025-04-24 DIAGNOSIS — Z87.898 PERSONAL HISTORY OF OTHER SPECIFIED CONDITIONS: ICD-10-CM

## 2025-04-24 DIAGNOSIS — Z3A.11 11 WEEKS GESTATION OF PREGNANCY: ICD-10-CM

## 2025-04-24 DIAGNOSIS — Z01.419 ENCOUNTER FOR GYNECOLOGICAL EXAMINATION (GENERAL) (ROUTINE) W/OUT ABNORMAL FINDINGS: ICD-10-CM

## 2025-04-24 DIAGNOSIS — E10.9 TYPE 1 DIABETES MELLITUS W/OUT COMPLICATIONS: ICD-10-CM

## 2025-04-24 DIAGNOSIS — N91.2 AMENORRHEA, UNSPECIFIED: ICD-10-CM

## 2025-04-24 DIAGNOSIS — E06.3 AUTOIMMUNE THYROIDITIS: ICD-10-CM

## 2025-04-24 DIAGNOSIS — O02.1 MISSED ABORTION: ICD-10-CM

## 2025-04-24 DIAGNOSIS — O03.9 COMPLETE OR UNSPECIFIED SPONTANEOUS ABORTION W/OUT COMPLICATION: ICD-10-CM

## 2025-04-24 DIAGNOSIS — Z87.440 PERSONAL HISTORY OF URINARY (TRACT) INFECTIONS: ICD-10-CM

## 2025-04-24 LAB
T4 FREE SERPL-MCNC: 1.3 NG/DL
TSH SERPL-ACNC: 1.68 UIU/ML

## 2025-04-24 PROCEDURE — 36415 COLL VENOUS BLD VENIPUNCTURE: CPT

## 2025-04-24 PROCEDURE — 99385 PREV VISIT NEW AGE 18-39: CPT

## 2025-04-25 LAB
C TRACH RRNA SPEC QL NAA+PROBE: NOT DETECTED
HPV 16 E6+E7 MRNA CVX QL NAA+PROBE: NOT DETECTED
HPV HIGH+LOW RISK DNA PNL CVX: NOT DETECTED
HPV18+45 E6+E7 MRNA CVX QL NAA+PROBE: NOT DETECTED
N GONORRHOEA RRNA SPEC QL NAA+PROBE: NOT DETECTED
SOURCE AMPLIFICATION: NORMAL

## 2025-04-29 ENCOUNTER — LABORATORY RESULT (OUTPATIENT)
Age: 35
End: 2025-04-29

## 2025-04-29 LAB
BASOPHILS # BLD AUTO: 0.02 K/UL
BASOPHILS NFR BLD AUTO: 0.3 %
CYTOLOGY CVX/VAG DOC THIN PREP: NORMAL
EOSINOPHIL # BLD AUTO: 0.16 K/UL
EOSINOPHIL NFR BLD AUTO: 2.4 %
FERRITIN SERPL-MCNC: 84 NG/ML
GLUCOSE SERPL-MCNC: 86 MG/DL
HCT VFR BLD CALC: 40.4 %
HGB BLD-MCNC: 13.7 G/DL
IMM GRANULOCYTES NFR BLD AUTO: 0.3 %
LUPUS ANTICOAGULANT CASCADE REFLEX: NORMAL
LYMPHOCYTES # BLD AUTO: 1.29 K/UL
LYMPHOCYTES NFR BLD AUTO: 19.6 %
MAN DIFF?: NORMAL
MCHC RBC-ENTMCNC: 31 PG
MCHC RBC-ENTMCNC: 33.9 G/DL
MCV RBC AUTO: 91.4 FL
MONOCYTES # BLD AUTO: 0.46 K/UL
MONOCYTES NFR BLD AUTO: 7 %
NEUTROPHILS # BLD AUTO: 4.64 K/UL
NEUTROPHILS NFR BLD AUTO: 70.4 %
PLATELET # BLD AUTO: 169 K/UL
RBC # BLD: 4.42 M/UL
RBC # FLD: 13 %
WBC # FLD AUTO: 6.59 K/UL

## 2025-04-30 ENCOUNTER — APPOINTMENT (OUTPATIENT)
Dept: OBGYN | Facility: CLINIC | Age: 35
End: 2025-04-30
Payer: COMMERCIAL

## 2025-04-30 ENCOUNTER — APPOINTMENT (OUTPATIENT)
Dept: ANTEPARTUM | Facility: CLINIC | Age: 35
End: 2025-04-30

## 2025-04-30 VITALS
DIASTOLIC BLOOD PRESSURE: 72 MMHG | HEIGHT: 62 IN | SYSTOLIC BLOOD PRESSURE: 114 MMHG | WEIGHT: 147 LBS | BODY MASS INDEX: 27.05 KG/M2

## 2025-04-30 LAB
ABORH: NORMAL
ANTIBODY SCREEN: NORMAL
ENA SS-A AB SER IA-ACNC: 0.3 AL
ENA SS-B AB SER IA-ACNC: 0.4 AL
ESTIMATED AVERAGE GLUCOSE: 143 MG/DL
HBA1C MFR BLD HPLC: 6.6 %
HBV SURFACE AB SER QL: REACTIVE
HGB A MFR BLD: 97.7 %
HGB A2 MFR BLD: 2.3 %
HGB FRACT BLD-IMP: NORMAL
HIV1+2 AB SPEC QL IA.RAPID: NONREACTIVE
LEAD BLD-MCNC: <1 UG/DL
MEV IGG FLD QL IA: >300 AU/ML
MEV IGG+IGM SER-IMP: POSITIVE
MUV AB SER-ACNC: NEGATIVE
MUV IGG SER QL IA: 6.97 AU/ML
RUBV IGG FLD-ACNC: 3.68 INDEX
RUBV IGG SER-IMP: POSITIVE

## 2025-04-30 PROCEDURE — 76817 TRANSVAGINAL US OBSTETRIC: CPT

## 2025-04-30 PROCEDURE — 76801 OB US < 14 WKS SINGLE FETUS: CPT

## 2025-04-30 PROCEDURE — 99213 OFFICE O/P EST LOW 20 MIN: CPT

## 2025-05-01 LAB
ANA PAT FLD IF-IMP: ABNORMAL
ANA SER IF-ACNC: ABNORMAL

## 2025-05-02 ENCOUNTER — RESULT REVIEW (OUTPATIENT)
Age: 35
End: 2025-05-02

## 2025-05-02 ENCOUNTER — TRANSCRIPTION ENCOUNTER (OUTPATIENT)
Age: 35
End: 2025-05-02

## 2025-05-02 ENCOUNTER — OUTPATIENT (OUTPATIENT)
Dept: OUTPATIENT SERVICES | Facility: HOSPITAL | Age: 35
LOS: 1 days | End: 2025-05-02
Payer: COMMERCIAL

## 2025-05-02 VITALS
DIASTOLIC BLOOD PRESSURE: 66 MMHG | RESPIRATION RATE: 18 BRPM | HEIGHT: 65 IN | HEART RATE: 58 BPM | OXYGEN SATURATION: 100 % | TEMPERATURE: 99 F | SYSTOLIC BLOOD PRESSURE: 108 MMHG | WEIGHT: 145.06 LBS

## 2025-05-02 VITALS
OXYGEN SATURATION: 100 % | HEART RATE: 57 BPM | RESPIRATION RATE: 15 BRPM | DIASTOLIC BLOOD PRESSURE: 70 MMHG | SYSTOLIC BLOOD PRESSURE: 110 MMHG

## 2025-05-02 DIAGNOSIS — Z98.890 OTHER SPECIFIED POSTPROCEDURAL STATES: Chronic | ICD-10-CM

## 2025-05-02 DIAGNOSIS — O02.1 MISSED ABORTION: ICD-10-CM

## 2025-05-02 LAB
APTT BLD: 32.2 SEC — SIGNIFICANT CHANGE UP (ref 26.1–36.8)
FIBRINOGEN PPP-MCNC: 217 MG/DL — SIGNIFICANT CHANGE UP (ref 200–465)
GLUCOSE BLDC GLUCOMTR-MCNC: 72 MG/DL — SIGNIFICANT CHANGE UP (ref 70–99)
HCT VFR BLD CALC: 41.8 % — SIGNIFICANT CHANGE UP (ref 34.5–45)
HGB BLD-MCNC: 14.2 G/DL — SIGNIFICANT CHANGE UP (ref 11.5–15.5)
INR BLD: 1.05 RATIO — SIGNIFICANT CHANGE UP (ref 0.85–1.16)
M TB IFN-G BLD-IMP: NEGATIVE
MCHC RBC-ENTMCNC: 30.9 PG — SIGNIFICANT CHANGE UP (ref 27–34)
MCHC RBC-ENTMCNC: 34 G/DL — SIGNIFICANT CHANGE UP (ref 32–36)
MCV RBC AUTO: 91.1 FL — SIGNIFICANT CHANGE UP (ref 80–100)
NRBC # BLD AUTO: 0 K/UL — SIGNIFICANT CHANGE UP (ref 0–0)
NRBC # FLD: 0 K/UL — SIGNIFICANT CHANGE UP (ref 0–0)
NRBC BLD AUTO-RTO: 0 /100 WBCS — SIGNIFICANT CHANGE UP (ref 0–0)
PLATELET # BLD AUTO: 165 K/UL — SIGNIFICANT CHANGE UP (ref 150–400)
PROTHROM AB SERPL-ACNC: 12.2 SEC — SIGNIFICANT CHANGE UP (ref 9.9–13.4)
QUANTIFERON TB PLUS MITOGEN MINUS NIL: >10 IU/ML
QUANTIFERON TB PLUS NIL: 0.02 IU/ML
QUANTIFERON TB PLUS TB1 MINUS NIL: 0 IU/ML
QUANTIFERON TB PLUS TB2 MINUS NIL: 0 IU/ML
RBC # BLD: 4.59 M/UL — SIGNIFICANT CHANGE UP (ref 3.8–5.2)
RBC # FLD: 12.4 % — SIGNIFICANT CHANGE UP (ref 10.3–14.5)
WBC # BLD: 4.69 K/UL — SIGNIFICANT CHANGE UP (ref 3.8–10.5)
WBC # FLD AUTO: 4.69 K/UL — SIGNIFICANT CHANGE UP (ref 3.8–10.5)

## 2025-05-02 PROCEDURE — 88291 CYTO/MOLECULAR REPORT: CPT

## 2025-05-02 PROCEDURE — 59812 TREATMENT OF MISCARRIAGE: CPT

## 2025-05-02 PROCEDURE — 88305 TISSUE EXAM BY PATHOLOGIST: CPT | Mod: 26

## 2025-05-02 RX ORDER — IBUPROFEN 200 MG
1 TABLET ORAL
Qty: 0 | Refills: 0 | DISCHARGE

## 2025-05-02 RX ORDER — FENTANYL CITRATE-0.9 % NACL/PF 100MCG/2ML
25 SYRINGE (ML) INTRAVENOUS
Refills: 0 | Status: DISCONTINUED | OUTPATIENT
Start: 2025-05-02 | End: 2025-05-02

## 2025-05-02 RX ORDER — ONDANSETRON HCL/PF 4 MG/2 ML
4 VIAL (ML) INJECTION ONCE
Refills: 0 | Status: ACTIVE | OUTPATIENT
Start: 2025-05-02 | End: 2026-03-31

## 2025-05-02 RX ORDER — ACETAMINOPHEN 500 MG/5ML
3 LIQUID (ML) ORAL
Qty: 0 | Refills: 0 | DISCHARGE

## 2025-05-02 NOTE — ASU DISCHARGE PLAN (ADULT/PEDIATRIC) - CARE PROVIDER_API CALL
Catarino Hopkins  Maternal/Fetal Medicine  [address] [facility], [address]  [place] [address]  Phone: [phone]  Fax: [phone]  Follow Up Time: 2 weeks

## 2025-05-02 NOTE — ASU DISCHARGE PLAN (ADULT/PEDIATRIC) - ASU DC SPECIAL INSTRUCTIONSFT
Return to your regular way of eating.  Complete vaginal rest, no tampons, no douching, no tub bathing, no sexual activities for 2 weeks unless otherwise instructed by your doctor.  Call your doctor with any signs and symptoms of infection such as fever, chills, nausea or vomiting.  Call your doctor if you're unable to tolerate food or have difficulty urinating.  Call your doctor if you have pain that is not relieved by Tylenol/Motrin. Notify your doctor with any other concerns.  Follow up with Dr. Hopkins in 2 weeks.

## 2025-05-02 NOTE — ASU DISCHARGE PLAN (ADULT/PEDIATRIC) - FOLLOW UP APPOINTMENTS
[facility] may also call Recovery Room (PACU) 24/7 @ [phone]/[facility] [place]

## 2025-05-02 NOTE — H&P PST ADULT - HISTORY OF PRESENT ILLNESS
Patient is a 35y  at 8w0d by LMP (3/7) who presents for scheduled D&C for missed .  Patient was seen in office for pregnancy confirmation and no FHR seen on sono outpatient.      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

## 2025-05-02 NOTE — ASU DISCHARGE PLAN (ADULT/PEDIATRIC) - FINANCIAL ASSISTANCE
[facility] provides services at a reduced cost to those who are determined to be eligible through [facility]’s financial assistance program. Information regarding [facility]’s financial assistance program can be found by going to [email or web address] or by calling [phone].

## 2025-05-02 NOTE — BRIEF OPERATIVE NOTE - OPERATION/FINDINGS
EUA: grossly normal external genitalia, cervix closed, mobile uterus approx 8w size  Preop  EUA: grossly normal external genitalia, cervix closed, mobile uterus approx 8w size  Preop US: small amount of tissue noted in uterus  DVC performed under direct ultrasound guidance with small amount of tissue obtained consistent with products of conception, tissue sent to pathology and cytogenetics  Postop US: thin endometrial stripe

## 2025-05-02 NOTE — H&P PST ADULT - ASSESSMENT
Patient is a 35y  at 8w0d by LMP (3/7) who presents for scheduled D&C for missed .      Plan:  - T&S, coags, CBC sent  - NPO    For D&C with Dr. Kellie Gonzales PGY3

## 2025-05-02 NOTE — ASU PREOP CHECKLIST - NS ASU TO WHOM HOLDING TO OR
LYNN YEE [Recent Change In Weight] : ~T recent weight change [Negative] : Heme/Lymph [Fever] : no fever [Night Sweats] : no night sweats

## 2025-05-02 NOTE — BRIEF OPERATIVE NOTE - NSICDXBRIEFPROCEDURE_GEN_ALL_CORE_FT
PROCEDURES:  Dilation and curettage, uterus, using suction, with US guidance 02-May-2025 13:42:30  Beronica Gonzales

## 2025-05-02 NOTE — ASU DISCHARGE PLAN (ADULT/PEDIATRIC) - NURSING INSTRUCTIONS
You received IV Toradol for pain management at 1:45 PM   , Please DO NOT take Motrin/Ibuprofen/Advil/Aleve/NSAIDs (Non-Steroidal Anti-Inflammatory Drugs) for the next 6 hours. (after 7:45 PM) You received IV Toradol for pain management at 2 PM   , Please DO NOT take Motrin/Ibuprofen/Advil/Aleve/NSAIDs (Non-Steroidal Anti-Inflammatory Drugs) for the next 6 hours. (after 8 PM)

## 2025-05-02 NOTE — H&P PST ADULT - ATTENDING COMMENTS
PAtient with congenital heart disease who is 8 weeks pregnant with missed  [facility] [place] [facility].

## 2025-05-07 LAB — SURGICAL PATHOLOGY STUDY: SIGNIFICANT CHANGE UP

## 2025-05-12 NOTE — DIETITIAN INITIAL EVALUATION ADULT - NUTRITION DIAGNOSITC TERMINOLOGY #1
Altered Nutrition Related Lab Values

## 2025-05-19 ENCOUNTER — APPOINTMENT (OUTPATIENT)
Dept: OBGYN | Facility: CLINIC | Age: 35
End: 2025-05-19

## 2025-05-19 ENCOUNTER — ASOB RESULT (OUTPATIENT)
Age: 35
End: 2025-05-19

## 2025-05-19 ENCOUNTER — APPOINTMENT (OUTPATIENT)
Dept: ANTEPARTUM | Facility: CLINIC | Age: 35
End: 2025-05-19
Payer: COMMERCIAL

## 2025-05-19 VITALS
WEIGHT: 148 LBS | DIASTOLIC BLOOD PRESSURE: 75 MMHG | BODY MASS INDEX: 24.66 KG/M2 | HEIGHT: 65 IN | SYSTOLIC BLOOD PRESSURE: 116 MMHG

## 2025-05-19 DIAGNOSIS — O03.9 COMPLETE OR UNSPECIFIED SPONTANEOUS ABORTION W/OUT COMPLICATION: ICD-10-CM

## 2025-05-19 PROCEDURE — 76857 US EXAM PELVIC LIMITED: CPT

## 2025-05-19 PROCEDURE — 36415 COLL VENOUS BLD VENIPUNCTURE: CPT

## 2025-05-19 PROCEDURE — 99024 POSTOP FOLLOW-UP VISIT: CPT

## 2025-05-19 PROCEDURE — 76830 TRANSVAGINAL US NON-OB: CPT

## 2025-05-22 LAB
C3 SERPL-MCNC: 94 MG/DL
C4 SERPL-MCNC: 16 MG/DL
CHROM ANALY OVERALL INTERP SPEC-IMP: SIGNIFICANT CHANGE UP
DSDNA AB SER-ACNC: 4 IU/ML
SMOOTH MUSCLE AB SER QL IF: NORMAL

## 2025-06-03 ENCOUNTER — APPOINTMENT (OUTPATIENT)
Dept: ENDOCRINOLOGY | Facility: CLINIC | Age: 35
End: 2025-06-03
Payer: COMMERCIAL

## 2025-06-03 VITALS
HEART RATE: 64 BPM | OXYGEN SATURATION: 97 % | SYSTOLIC BLOOD PRESSURE: 100 MMHG | DIASTOLIC BLOOD PRESSURE: 70 MMHG | WEIGHT: 146 LBS | BODY MASS INDEX: 24.32 KG/M2 | HEIGHT: 65 IN

## 2025-06-03 DIAGNOSIS — E10.9 TYPE 1 DIABETES MELLITUS W/OUT COMPLICATIONS: ICD-10-CM

## 2025-06-03 DIAGNOSIS — E06.3 AUTOIMMUNE THYROIDITIS: ICD-10-CM

## 2025-06-03 PROCEDURE — 99214 OFFICE O/P EST MOD 30 MIN: CPT

## 2025-06-03 PROCEDURE — G2211 COMPLEX E/M VISIT ADD ON: CPT | Mod: NC

## 2025-06-03 PROCEDURE — 95251 CONT GLUC MNTR ANALYSIS I&R: CPT

## 2025-08-04 ENCOUNTER — RX RENEWAL (OUTPATIENT)
Age: 35
End: 2025-08-04

## 2025-09-05 ENCOUNTER — APPOINTMENT (OUTPATIENT)
Dept: ENDOCRINOLOGY | Facility: CLINIC | Age: 35
End: 2025-09-05

## 2025-09-05 ENCOUNTER — NON-APPOINTMENT (OUTPATIENT)
Age: 35
End: 2025-09-05

## (undated) DEVICE — SUT SILK 0 30" PSL

## (undated) DEVICE — PACK L&D C SECTION

## (undated) DEVICE — SUT SILK 0 24" SH DA

## (undated) DEVICE — GLV 7.5 PROTEXIS (WHITE)

## (undated) DEVICE — SUT CHROMIC 1 36" CTX

## (undated) DEVICE — DRSG CURITY GAUZE SPONGE 4 X 4" 12-PLY

## (undated) DEVICE — ELCTR GROUNDING PAD INFANT COVIDIEN

## (undated) DEVICE — GOWN XL

## (undated) DEVICE — DRAPE TOWEL BLUE 17" X 24"

## (undated) DEVICE — FOLEY CATH PLUG

## (undated) DEVICE — POSITIONER PINK PAD PIGAZZI SYSTEM

## (undated) DEVICE — VENODYNE/SCD SLEEVE CALF MEDIUM

## (undated) DEVICE — DRAPE LIGHT HANDLE COVER (GREEN)

## (undated) DEVICE — TUBING SUCTION NONCONDUCTIVE 6MM X 12FT

## (undated) DEVICE — STAPLER SKIN MULTI DIRECTION W35

## (undated) DEVICE — SUT VICRYL 0 36" CT-1 UNDYED

## (undated) DEVICE — ELCTR BOVIE PENCIL SMOKE EVACUATION

## (undated) DEVICE — SUT CHROMIC 0 36" CTX

## (undated) DEVICE — SOL IRR POUR NS 0.9% 500ML

## (undated) DEVICE — BAG RESUSITATOR MASK MANOMETER INF

## (undated) DEVICE — SYR LUER LOK 1CC

## (undated) DEVICE — DRAPE WARMING SOLUTION 44 X 44"

## (undated) DEVICE — FOLEY CATH 3-WAY 18FR 30CC LATEX LUBRICATH